# Patient Record
Sex: MALE | Race: WHITE | NOT HISPANIC OR LATINO | Employment: PART TIME | URBAN - METROPOLITAN AREA
[De-identification: names, ages, dates, MRNs, and addresses within clinical notes are randomized per-mention and may not be internally consistent; named-entity substitution may affect disease eponyms.]

---

## 2017-02-13 ENCOUNTER — HOSPITAL ENCOUNTER (OUTPATIENT)
Dept: LAB | Facility: MEDICAL CENTER | Age: 67
End: 2017-02-13
Attending: SPECIALIST
Payer: COMMERCIAL

## 2017-02-13 PROCEDURE — 82105 ALPHA-FETOPROTEIN SERUM: CPT

## 2017-02-13 PROCEDURE — 36415 COLL VENOUS BLD VENIPUNCTURE: CPT

## 2017-02-14 LAB — AFP-TM SERPL-MCNC: 1 NG/ML (ref 0–9)

## 2017-02-21 ENCOUNTER — HOSPITAL ENCOUNTER (OUTPATIENT)
Dept: RADIOLOGY | Facility: MEDICAL CENTER | Age: 67
End: 2017-02-21
Attending: SPECIALIST
Payer: COMMERCIAL

## 2017-02-21 DIAGNOSIS — K70.9 ALCOHOLIC LIVER DISEASE (HCC): ICD-10-CM

## 2017-02-21 PROCEDURE — 76700 US EXAM ABDOM COMPLETE: CPT

## 2017-05-03 ENCOUNTER — TELEPHONE (OUTPATIENT)
Dept: MEDICAL GROUP | Facility: MEDICAL CENTER | Age: 67
End: 2017-05-03

## 2017-06-07 RX ORDER — FLUTICASONE PROPIONATE 50 MCG
SPRAY, SUSPENSION (ML) NASAL
Qty: 16 G | Refills: 0 | Status: SHIPPED | OUTPATIENT
Start: 2017-06-07 | End: 2017-07-17

## 2017-06-07 NOTE — TELEPHONE ENCOUNTER
Was the patient seen in the last year in this department? No    Does patient have an active prescription for medications requested? No     Received Request Via: Pharmacy

## 2017-06-09 ENCOUNTER — OFFICE VISIT (OUTPATIENT)
Dept: URGENT CARE | Facility: CLINIC | Age: 67
End: 2017-06-09
Payer: MEDICARE

## 2017-06-09 VITALS
TEMPERATURE: 98.2 F | OXYGEN SATURATION: 97 % | SYSTOLIC BLOOD PRESSURE: 110 MMHG | BODY MASS INDEX: 23.42 KG/M2 | HEART RATE: 90 BPM | WEIGHT: 154 LBS | DIASTOLIC BLOOD PRESSURE: 70 MMHG | RESPIRATION RATE: 16 BRPM

## 2017-06-09 DIAGNOSIS — J40 BRONCHITIS: Primary | ICD-10-CM

## 2017-06-09 DIAGNOSIS — J02.9 PHARYNGITIS, UNSPECIFIED ETIOLOGY: ICD-10-CM

## 2017-06-09 DIAGNOSIS — J06.9 URI WITH COUGH AND CONGESTION: ICD-10-CM

## 2017-06-09 PROCEDURE — 99214 OFFICE O/P EST MOD 30 MIN: CPT | Performed by: PHYSICIAN ASSISTANT

## 2017-06-09 RX ORDER — METHYLPREDNISOLONE 4 MG/1
TABLET ORAL
Qty: 21 TAB | Refills: 0 | Status: SHIPPED | OUTPATIENT
Start: 2017-06-09 | End: 2017-06-29

## 2017-06-09 RX ORDER — DOXYCYCLINE HYCLATE 100 MG
100 TABLET ORAL 2 TIMES DAILY
Qty: 20 TAB | Refills: 0 | Status: SHIPPED | OUTPATIENT
Start: 2017-06-09 | End: 2017-06-19

## 2017-06-09 ASSESSMENT — ENCOUNTER SYMPTOMS: COUGH: 1

## 2017-06-09 NOTE — PROGRESS NOTES
Subjective:      Pt is a 67 y.o. male who presents with Cough            Cough  This is a new problem. The current episode started in the past 7 days. The problem has been gradually worsening. The problem occurs constantly. The cough is productive of purulent sputum. The symptoms are aggravated by cold air, exercise and lying down. He has tried OTC cough suppressant for the symptoms. The treatment provided no relief. His past medical history is significant for bronchitis and pneumonia. There is no history of asthma or bronchiectasis.   PT presents to  clinic today complaining of sore throat, fevers, chills, watery eyes, pressure in ears, cough, fatigue, runny nose, wheezing and SOB. PT denies CP, NVD, abdominal pain, joint pain. PT states these symptoms began around 7 days ago and that the pt's family has been sick on and off for the last week. Pt has not taken any medications for this condition. PT states the pain is a 7/10 with coughing fits, aching in nature and worse at night.  The pt's medication list, problem list, and allergies have been evaluated and reviewed during today's visit.    PMH:  Past Medical History   Diagnosis Date   • GERD (gastroesophageal reflux disease)    • Chronic back pain    • Diverticulitis        PSH:  Past Surgical History   Procedure Laterality Date   • Colonoscopy - endo  2014     Performed by Moris Stanton D.O. at ENDOSCOPY [facility] Hx:  Noncontributory     Family Status   Relation Status Death Age   • Mother Alive    • Father     • Brother Alive        Soc HX:  Social History     Social History   • Marital Status:      Spouse Name: N/A   • Number of Children: N/A   • Years of Education: N/A     Occupational History   • Not on file.     Social History Main Topics   • Smoking status: Former Smoker -- 0.00 packs/day for 10 years     Quit date: 11/15/2014   • Smokeless tobacco: Current User   • Alcohol Use: No   • Drug Use: No   • Sexual  Activity: Not on file     Other Topics Concern   • Not on file     Social History Narrative    ** Merged History Encounter **              Medications:    Current outpatient prescriptions:   •  BABY ASPIRIN PO, Take  by mouth., Disp: , Rfl:   •  vitamin D (CHOLECALCIFEROL) 1000 UNIT Tab, Take 1,000 Units by mouth every day., Disp: , Rfl:   •  doxycycline (VIBRAMYCIN) 100 MG Tab, Take 1 Tab by mouth 2 times a day for 10 days., Disp: 20 Tab, Rfl: 0  •  Hydrocod Polst-CPM Polst ER (TUSSIONEX) 10-8 MG/5ML Suspension Extended Release, Take 5 mL by mouth every 12 hours., Disp: 140 mL, Rfl: 0  •  MethylPREDNISolone (MEDROL DOSEPAK) 4 MG Tablet Therapy Pack, Use as directed, Disp: 21 Tab, Rfl: 0  •  ranitidine (ZANTAC) 150 MG Tab, Take 150 mg by mouth 2 times a day., Disp: , Rfl:   •  fluticasone (FLONASE) 50 MCG/ACT nasal spray, USE TWO SPRAY(S) IN EACH NOSTRIL ONCE DAILY, Disp: 16 g, Rfl: 0  •  ranitidine (ZANTAC) 150 MG Tab, Take 150 mg by mouth 2 times a day. Indications: Gastroesophageal Reflux Disease, Disp: , Rfl:   •  zolpidem (AMBIEN) 10 MG Tab, TAKE 1 TABLET BY MOUTH AT BEDTIME AS NEEDED FOR SLEEP, Disp: 30 Tab, Rfl: 2  •  Zolpidem Tartrate (ZOLPIMIST PO), Take  by mouth., Disp: , Rfl:   •  fluticasone (FLONASE) 50 MCG/ACT nasal spray, Spray 1 Spray in nose every day., Disp: 16 g, Rfl: 1  •  betamethasone dipropionate (DIPROLENE) 0.05 % Cream, Apply  to affected area(s) 2 times a day., Disp: , Rfl:       Allergies:  Pantoprazole and Pantoprazole        Review of Systems   Respiratory: Positive for cough.    Constitutional: Positive for chills and malaise/fatigue. Negative for fever and diaphoresis.   HENT: Positive for congestion, ear pain and sore throat. Negative for ear discharge, hearing loss, nosebleeds and tinnitus.    Eyes: Negative for blurred vision, double vision and photophobia.   Respiratory continued: Positive for cough, sputum production, shortness of breath and wheezing. Negative for hemoptysis.     Cardiovascular: Negative for chest pain and palpitations.   Gastrointestinal: Negative for nausea, vomiting, abdominal pain, diarrhea and constipation.   Genitourinary: Negative for dysuria and flank pain.   Musculoskeletal: Negative for joint pain and myalgias.   Skin: Negative for itching and rash.   Neurological: Positive for headaches. Negative for dizziness, tingling and weakness.   Endo/Heme/Allergies: Does not bruise/bleed easily.   Psychiatric/Behavioral: Negative for depression. The patient is not nervous/anxious.               Objective:     /70 mmHg  Pulse 90  Temp(Src) 36.8 °C (98.2 °F)  Resp 16  Wt 69.854 kg (154 lb)  SpO2 97%     Physical Exam      Physical Exam   Constitutional: PT is oriented to person, place, and time. PT appears well-developed and well-nourished. No distress.   HENT:   Head: Normocephalic and atraumatic.   Right Ear: Hearing, tympanic membrane, external ear and ear canal normal.   Left Ear: Hearing, tympanic membrane, external ear and ear canal normal.   Nose: Mucosal edema, rhinorrhea and sinus tenderness present. Right sinus exhibits frontal sinus tenderness. Left sinus exhibits frontal sinus tenderness.   Mouth/Throat: Uvula is midline. Mucous membranes are pale. Posterior oropharyngeal edema and posterior oropharyngeal erythema present. No oropharyngeal exudate.   Eyes: Conjunctivae normal and EOM are normal. Pupils are equal, round, and reactive to light. Right eye exhibits no discharge. Left eye exhibits no discharge.   Neck: Normal range of motion. Neck supple. No thyromegaly present.   Cardiovascular: Normal rate, regular rhythm, normal heart sounds and intact distal pulses.  Exam reveals no gallop and no friction rub.    No murmur heard.  Pulmonary/Chest: Effort normal. No respiratory distress. PT has wheezes. PT has no rales. PT exhibits tenderness.   Abdominal: Soft. Bowel sounds are normal. PT exhibits no distension and no mass. There is no tenderness.  There is no rebound and no guarding.   Musculoskeletal: Normal range of motion. PT exhibits no edema and no tenderness.   Lymphadenopathy:     PT has no cervical adenopathy.   Neurological: Pt is alert and oriented to person, place, and time. Pt has normal reflexes. No cranial nerve deficit.   Skin: Skin is warm and dry. No rash noted. No erythema.   Psychiatric: PT has a normal mood and affect. Pt behavior is normal. Judgment and thought content normal.          Assessment/Plan:     1. Bronchitis    - doxycycline (VIBRAMYCIN) 100 MG Tab; Take 1 Tab by mouth 2 times a day for 10 days.  Dispense: 20 Tab; Refill: 0  - MethylPREDNISolone (MEDROL DOSEPAK) 4 MG Tablet Therapy Pack; Use as directed  Dispense: 21 Tab; Refill: 0    2. URI with cough and congestion    - Hydrocod Polst-CPM Polst ER (TUSSIONEX) 10-8 MG/5ML Suspension Extended Release; Take 5 mL by mouth every 12 hours.  Dispense: 140 mL; Refill: 0  - MethylPREDNISolone (MEDROL DOSEPAK) 4 MG Tablet Therapy Pack; Use as directed  Dispense: 21 Tab; Refill: 0    3. Pharyngitis, unspecified etiology      Rest, fluids encouraged.  OTC decongestant for congestion/cough  AVS with medical info given.  Pt was in full understanding and agreement with the plan.  Follow-up as needed if symptoms worsen or fail to improve.

## 2017-06-09 NOTE — PATIENT INSTRUCTIONS
Acute Bronchitis  Bronchitis is inflammation of the airways that extend from the windpipe into the lungs (bronchi). The inflammation often causes mucus to develop. This leads to a cough, which is the most common symptom of bronchitis.   In acute bronchitis, the condition usually develops suddenly and goes away over time, usually in a couple weeks. Smoking, allergies, and asthma can make bronchitis worse. Repeated episodes of bronchitis may cause further lung problems.   CAUSES  Acute bronchitis is most often caused by the same virus that causes a cold. The virus can spread from person to person (contagious) through coughing, sneezing, and touching contaminated objects.  SIGNS AND SYMPTOMS   · Cough.    · Fever.    · Coughing up mucus.    · Body aches.    · Chest congestion.    · Chills.    · Shortness of breath.    · Sore throat.    DIAGNOSIS   Acute bronchitis is usually diagnosed through a physical exam. Your health care provider will also ask you questions about your medical history. Tests, such as chest X-rays, are sometimes done to rule out other conditions.   TREATMENT   Acute bronchitis usually goes away in a couple weeks. Oftentimes, no medical treatment is necessary. Medicines are sometimes given for relief of fever or cough. Antibiotic medicines are usually not needed but may be prescribed in certain situations. In some cases, an inhaler may be recommended to help reduce shortness of breath and control the cough. A cool mist vaporizer may also be used to help thin bronchial secretions and make it easier to clear the chest.   HOME CARE INSTRUCTIONS  · Get plenty of rest.    · Drink enough fluids to keep your urine clear or pale yellow (unless you have a medical condition that requires fluid restriction). Increasing fluids may help thin your respiratory secretions (sputum) and reduce chest congestion, and it will prevent dehydration.    · Take medicines only as directed by your health care provider.  · If  you were prescribed an antibiotic medicine, finish it all even if you start to feel better.  · Avoid smoking and secondhand smoke. Exposure to cigarette smoke or irritating chemicals will make bronchitis worse. If you are a smoker, consider using nicotine gum or skin patches to help control withdrawal symptoms. Quitting smoking will help your lungs heal faster.    · Reduce the chances of another bout of acute bronchitis by washing your hands frequently, avoiding people with cold symptoms, and trying not to touch your hands to your mouth, nose, or eyes.    · Keep all follow-up visits as directed by your health care provider.    SEEK MEDICAL CARE IF:  Your symptoms do not improve after 1 week of treatment.   SEEK IMMEDIATE MEDICAL CARE IF:  · You develop an increased fever or chills.    · You have chest pain.    · You have severe shortness of breath.  · You have bloody sputum.    · You develop dehydration.  · You faint or repeatedly feel like you are going to pass out.  · You develop repeated vomiting.  · You develop a severe headache.  MAKE SURE YOU:   · Understand these instructions.  · Will watch your condition.  · Will get help right away if you are not doing well or get worse.     This information is not intended to replace advice given to you by your health care provider. Make sure you discuss any questions you have with your health care provider.     Document Released: 01/25/2006 Document Revised: 01/08/2016 Document Reviewed: 06/10/2014  [employer] Interactive Patient Education ©2016 [employer]

## 2017-06-09 NOTE — MR AVS SNAPSHOT
Vickey Dalal Johnny   2017 8:15 AM   Office Visit   MRN: [id number]    Department:  [facility]   Dept Phone:  [phone]    Description:  Male : 1950   Provider:  Roland Mackay PA-C           Reason for Visit     Cough x 5 days, productive cough, shortness of breath, cough is worse at nig[place], runny nose and little sore throat      Allergies as of 2017     Allergen Noted Reactions    Pantoprazole 2015   Rash    itching    Pantoprazole 2016         You were diagnosed with     Bronchitis   [[id number]]  -  Primary     URI with cough and congestion   [[id number]]       Pharyngitis, unspecified etiology   [[id number]]         Vital Signs     Blood Pressure Pulse Temperature Respirations Weight Oxygen Saturation    110/70 mmHg 90 36.8 °C (98.2 °F) 16 69.854 kg (154 lb) 97%    Smoking Status                   Former Smoker           Basic Information     Date Of Birth Sex Race Ethnicity Preferred Language    1950 Male White Non- English      Your appointments     2017  9:00 AM   ANNUAL EXAM PREVENTATIVE with Bharath Mathias M.D.   [facility] (--)    [address]  [address]  [facility] [id number] [phone]              Problem List              ICD-10-CM Priority Class Noted - Resolved    Moderate alcohol use disorder F10.20   10/29/2014 - Present    Seasonal allergies J30.2   10/29/2014 - Present    Skin lesions, generalized L98.9   10/29/2014 - Present    GI bleed K92.2   2014 - Present    Marijuana use F12.10   2014 - Present    Diverticulitis K57.92   2014 - Present    Insomnia G47.00   2014 - Present    Alcohol dependence ([facility]) F10.20   2014 - Present    Lumbosacral spondylosis M47.817   5/15/2015 - Present    Left foot pain M79.672   5/15/2015 - Present    Lumbar radiculopathy M54.16   5/15/2015 - Present    DDD (degenerative disc disease), lumbar M51.36   5/15/2015 - Present    Chronic back  pain M54.9, G89.29   5/15/2015 - Present    Rash R21   6/23/2015 - Present    Gastroesophageal reflux disease with esophagitis K21.0   8/5/2015 - Present    Preventative health care Z00.00   3/14/2016 - Present    Controlled substance agreement signed Z79.899   4/27/2016 - Present      Health Maintenance        Date Due Completion Dates    IMM DTaP/Tdap/Td Vaccine (1 - Tdap) 3/17/1971 ---    IMM ZOSTER VACCINE 3/17/2012 ---    IMM PNEUMOCOCCAL 65+ (ADULT) LOW/MEDIUM RISK SERIES (1 of 2 - PCV13) 3/17/2017 ---    COLONOSCOPY 3/14/2025 3/14/2015 (Done), 11/17/2014, 10/29/2007 (Prv Comp)    Override on 3/14/2015: Done    Override on 10/29/2007: Previously completed            Current Immunizations     No immunizations on file.      Below and/or attached are the medications your provider expects you to take. Review all of your home medications and newly ordered medications with your provider and/or pharmacist. Follow medication instructions as directed by your provider and/or pharmacist. Please keep your medication list with you and share with your provider. Update the information when medications are discontinued, doses are changed, or new medications (including over-the-counter products) are added; and carry medication information at all times in the event of emergency situations     Allergies:  PANTOPRAZOLE - Rash     PANTOPRAZOLE - (reactions not documented)               Medications  Valid as of: June 09, 2017 -  8:42 AM    Generic Name Brand Name Tablet Size Instructions for use    Aspirin   Take  by mouth.        Betamethasone Dipropionate (Cream) DIPROLENE 0.05 % Apply  to affected area(s) 2 times a day.        Cholecalciferol (Tab) cholecalciferol 1000 UNIT Take 1,000 Units by mouth every day.        Doxycycline Hyclate (Tab) VIBRAMYCIN 100 MG Take 1 Tab by mouth 2 times a day for 10 days.        Fluticasone Propionate (Suspension) FLONASE 50 MCG/ACT Spray 1 Spray in nose every day.        Fluticasone Propionate  (Suspension) FLONASE 50 MCG/ACT USE TWO SPRAY(S) IN EACH NOSTRIL ONCE DAILY        Hydrocod Polst-Chlorphen Polst (Suspension Extended Release) TUSSIONEX 10-8 MG/5ML Take 5 mL by mouth every 12 hours.        MethylPREDNISolone (Tablet Therapy Pack) MEDROL DOSEPAK 4 MG Use as directed        RaNITidine HCl (Tab) ZANTAC 150 MG Take 150 mg by mouth 2 times a day.        RaNITidine HCl (Tab) ZANTAC 150 MG Take 150 mg by mouth 2 times a day. Indications: Gastroesophageal Reflux Disease        Zolpidem Tartrate   Take  by mouth.        Zolpidem Tartrate (Tab) AMBIEN 10 MG TAKE 1 TABLET BY MOUTH AT BEDTIME AS NEEDED FOR SLEEP        .                 Medicines prescribed today were sent to:     [facility] #[id number] - [place], [place] - [address]    [address] [place] [address]    Phone: [phone] Fax: [phone]    Open 24 Hours?: No    [facility]  [place] (), [place] - [address] [place] () [place] [address]    Phone: [phone] Fax: [phone]    Open 24 Hours?: No      Medication refill instructions:       If your prescription bottle indicates you have medication refills left, it is not necessary to call your provider’s office. Please contact your pharmacy and they will refill your medication.    If your prescription bottle indicates you do not have any refills left, you may request refills at any time through one of the following ways: The online [employer] system (except Urgent Care), by calling your provider’s office, or by asking your pharmacy to contact your provider’s office with a refill request. Medication refills are processed only during regular business hours and may not be available until the next business day. Your provider may request additional information or to have a follow-up visit with you prior to refilling your medication.   *Please Note: Medication refills are assigned a new Rx number when refilled electronically. Your pharmacy may indicate that no refills were  authorized even though a new prescription for the same medication is available at the pharmacy. Please request the medicine by name with the pharmacy before contacting your provider for a refill.        Instructions    Acute Bronchitis  Bronchitis is inflammation of the airways that extend from the windpipe into the lungs (bronchi). The inflammation often causes mucus to develop. This leads to a cough, which is the most common symptom of bronchitis.   In acute bronchitis, the condition usually develops suddenly and goes away over time, usually in a couple weeks. Smoking, allergies, and asthma can make bronchitis worse. Repeated episodes of bronchitis may cause further lung problems.   CAUSES  Acute bronchitis is most often caused by the same virus that causes a cold. The virus can spread from person to person (contagious) through coughing, sneezing, and touching contaminated objects.  SIGNS AND SYMPTOMS   · Cough.    · Fever.    · Coughing up mucus.    · Body aches.    · Chest congestion.    · Chills.    · Shortness of breath.    · Sore throat.    DIAGNOSIS   Acute bronchitis is usually diagnosed through a physical exam. Your health care provider will also ask you questions about your medical history. Tests, such as chest X-rays, are sometimes done to rule out other conditions.   TREATMENT   Acute bronchitis usually goes away in a couple weeks. Oftentimes, no medical treatment is necessary. Medicines are sometimes given for relief of fever or cough. Antibiotic medicines are usually not needed but may be prescribed in certain situations. In some cases, an inhaler may be recommended to help reduce shortness of breath and control the cough. A cool mist vaporizer may also be used to help thin bronchial secretions and make it easier to clear the chest.   HOME CARE INSTRUCTIONS  · Get plenty of rest.    · Drink enough fluids to keep your urine clear or pale yellow (unless you have a medical condition that requires fluid  restriction). Increasing fluids may help thin your respiratory secretions (sputum) and reduce chest congestion, and it will prevent dehydration.    · Take medicines only as directed by your health care provider.  · If you were prescribed an antibiotic medicine, finish it all even if you start to feel better.  · Avoid smoking and secondhand smoke. Exposure to cigarette smoke or irritating chemicals will make bronchitis worse. If you are a smoker, consider using nicotine gum or skin patches to help control withdrawal symptoms. Quitting smoking will help your lungs heal faster.    · Reduce the chances of another bout of acute bronchitis by washing your hands frequently, avoiding people with cold symptoms, and trying not to touch your hands to your mouth, nose, or eyes.    · Keep all follow-up visits as directed by your health care provider.    SEEK MEDICAL CARE IF:  Your symptoms do not improve after 1 week of treatment.   SEEK IMMEDIATE MEDICAL CARE IF:  · You develop an increased fever or chills.    · You have chest pain.    · You have severe shortness of breath.  · You have bloody sputum.    · You develop dehydration.  · You faint or repeatedly feel like you are going to pass out.  · You develop repeated vomiting.  · You develop a severe headache.  MAKE SURE YOU:   · Understand these instructions.  · Will watch your condition.  · Will get help right away if you are not doing well or get worse.     This information is not intended to replace advice given to you by your health care provider. Make sure you discuss any questions you have with your health care provider.     Document Released: 01/25/2006 Document Revised: 01/08/2016 Document Reviewed: 06/10/2014  [employer] Interactive Patient Education ©2016 [employer]            [employer] Access Code: Activation code not generated  Current Generic Media Status: Active          Quit Tobacco Information     Do you want to quit using tobacco?    Quitting tobacco decreases risks  of cancer, heart and lung disease, increases life expectancy, improves sense of taste and smell, and increases spending money, among other benefits.    If you are thinking about quitting, we can help.  • [facility] Quit Tobacco Program: 291.268.8678  o Program occurs weekly for four weeks and includes pharmacist consultation on products to support quitting smoking or chewing tobacco. A provider referral is needed for pharmacist consultation.  • Tobacco Users Help Hotline: 9-536-QUIT-NOW (049-5531) or https://[place].quitlogix.org/  o Free, confidential telephone and online coaching for [place] residents. Sessions are designed on a schedule that is convenient for you. Eligible clients receive free nicotine replacement therapy.  • Nationally: www.smokefree.gov  o Information and professional assistance to support both immediate and long-term needs as you become, and remain, a non-smoker. Smokefree.gov allows you to choose the help that best fits your needs.

## 2017-06-29 ENCOUNTER — OFFICE VISIT (OUTPATIENT)
Dept: MEDICAL GROUP | Facility: PHYSICIAN GROUP | Age: 67
End: 2017-06-29
Payer: MEDICARE

## 2017-06-29 VITALS
HEIGHT: 68 IN | RESPIRATION RATE: 16 BRPM | DIASTOLIC BLOOD PRESSURE: 80 MMHG | SYSTOLIC BLOOD PRESSURE: 126 MMHG | OXYGEN SATURATION: 95 % | TEMPERATURE: 97.8 F | WEIGHT: 151 LBS | HEART RATE: 80 BPM | BODY MASS INDEX: 22.88 KG/M2

## 2017-06-29 DIAGNOSIS — J30.1 SEASONAL ALLERGIC RHINITIS DUE TO POLLEN: ICD-10-CM

## 2017-06-29 DIAGNOSIS — K70.9 ALCOHOLIC LIVER DISEASE, UNSPECIFIED (HCC): ICD-10-CM

## 2017-06-29 DIAGNOSIS — F10.21 ALCOHOL DEPENDENCE IN REMISSION (HCC): ICD-10-CM

## 2017-06-29 DIAGNOSIS — K21.00 GASTROESOPHAGEAL REFLUX DISEASE WITH ESOPHAGITIS: ICD-10-CM

## 2017-06-29 DIAGNOSIS — M51.36 DDD (DEGENERATIVE DISC DISEASE), LUMBAR: ICD-10-CM

## 2017-06-29 DIAGNOSIS — K57.32 DIVERTICULITIS OF LARGE INTESTINE WITHOUT PERFORATION OR ABSCESS WITHOUT BLEEDING: ICD-10-CM

## 2017-06-29 DIAGNOSIS — F12.90 MARIJUANA USE: ICD-10-CM

## 2017-06-29 DIAGNOSIS — E78.2 MIXED HYPERLIPIDEMIA: ICD-10-CM

## 2017-06-29 DIAGNOSIS — R35.0 URINARY FREQUENCY: ICD-10-CM

## 2017-06-29 PROCEDURE — 99214 OFFICE O/P EST MOD 30 MIN: CPT | Performed by: NURSE PRACTITIONER

## 2017-06-29 RX ORDER — HYDROCODONE BITARTRATE AND ACETAMINOPHEN 10; 325 MG/1; MG/1
1 TABLET ORAL DAILY
COMMUNITY
Start: 2017-06-07 | End: 2017-08-25

## 2017-06-29 ASSESSMENT — PAIN SCALES - GENERAL: PAINLEVEL: NO PAIN

## 2017-06-29 ASSESSMENT — PATIENT HEALTH QUESTIONNAIRE - PHQ9: CLINICAL INTERPRETATION OF PHQ2 SCORE: 0

## 2017-06-29 NOTE — MR AVS SNAPSHOT
"        Vickey Turner   2017 1:40 PM   Office Visit   MRN: [id number]    Department:  [facility]   Dept Phone:  [phone]    Description:  Male : 1950   Provider:  ANCELMO Humphreys           Reason for Visit     Establish Care New Patient     Annual Exam     Cough x 9 month       Allergies as of 2017     Allergen Noted Reactions    Pantoprazole 2015   Rash    itching    Pantoprazole 2016         You were diagnosed with     Urinary frequency   [788.41.ICD-9-CM]       Alcoholic liver disease, unspecified (CMS-HCC)   [3690902]       Mixed hyperlipidemia   [272.2.ICD-9-CM]         Vital Signs     Blood Pressure Pulse Temperature Respirations Height Weight    126/80 mmHg 80 36.6 °C (97.8 °F) 16 1.727 m (5' 7.99\") 68.493 kg (151 lb)    Body Mass Index Oxygen Saturation Smoking Status             22.96 kg/m2 95% Never Smoker          Basic Information     Date Of Birth Sex Race Ethnicity Preferred Language    1950 Male White Non- English      Your appointments     Aug 03, 2017  8:00 AM   Established Patient with ANCELMO Humphreys   [facility] (--)    [address]  [address]  [place] [address] [phone]           You will be receiving a confirmation call a few days before your appointment from our automated call confirmation system.              Problem List              ICD-10-CM Priority Class Noted - Resolved    Seasonal allergies J30.2   10/29/2014 - Present    Marijuana use F12.10   2014 - Present    Diverticulitis K57.92   2014 - Present    Insomnia G47.00   2014 - Present    Alcohol dependence (CMS-HCC) F10.20   2014 - Present    Lumbar radiculopathy M54.16   5/15/2015 - Present    DDD (degenerative disc disease), lumbar M51.36   5/15/2015 - Present    Chronic back pain M54.9, G89.29   5/15/2015 - Present    Gastroesophageal reflux disease with esophagitis K21.0   2015 - " Present    [facility] health care Z00.00   3/14/2016 - Present    Controlled substance agreement signed Z79.899   4/27/2016 - Present      Health Maintenance        Date Due Completion Dates    IMM ZOSTER VACCINE 3/17/2012 ---    IMM PNEUMOCOCCAL 65+ (ADULT) LOW/MEDIUM RISK SERIES (1 of 2 - PCV13) 3/17/2017 ---    IMM DTaP/Tdap/Td Vaccine (2 - Td) 2/16/2021 2/16/2011    COLONOSCOPY 11/17/2024 11/17/2014, 11/16/2014            Current Immunizations     Tdap Vaccine 2/16/2011      Below and/or attached are the medications your provider expects you to take. Review all of your home medications and newly ordered medications with your provider and/or pharmacist. Follow medication instructions as directed by your provider and/or pharmacist. Please keep your medication list with you and share with your provider. Update the information when medications are discontinued, doses are changed, or new medications (including over-the-counter products) are added; and carry medication information at all times in the event of emergency situations     Allergies:  PANTOPRAZOLE - Rash     PANTOPRAZOLE - (reactions not documented)               Medications  Valid as of: June 29, 2017 -  2:22 PM    Generic Name Brand Name Tablet Size Instructions for use    Aspirin   Take  by mouth.        Betamethasone Dipropionate (Cream) DIPROLENE 0.05 % Apply  to affected area(s) 2 times a day.        Cholecalciferol (Tab) cholecalciferol 1000 UNIT Take 1,000 Units by mouth every day.        Fluticasone Propionate (Suspension) FLONASE 50 MCG/ACT Spray 1 Spray in nose every day.        Fluticasone Propionate (Suspension) FLONASE 50 MCG/ACT USE TWO SPRAY(S) IN EACH NOSTRIL ONCE DAILY        Hydrocodone-Acetaminophen (Tab) NORCO  MG Take 1 Tab by mouth every day.        RaNITidine HCl (Tab) ZANTAC 150 MG Take 150 mg by mouth 2 times a day.        RaNITidine HCl (Tab) ZANTAC 150 MG Take 150 mg by mouth 2 times a day. Indications: Gastroesophageal  Reflux Disease        Zolpidem Tartrate   Take  by mouth.        Zolpidem Tartrate (Tab) AMBIEN 10 MG TAKE 1 TABLET BY MOUTH AT BEDTIME AS NEEDED FOR SLEEP        .                 Medicines prescribed today were sent to:     [facility] [address] [place] [address] (), [place] - [address]    [address] () [place] [address]    Phone: [phone] Fax: [phone]    Open 24 Hours?: No      Medication refill instructions:       If your prescription bottle indicates you have medication refills left, it is not necessary to call your provider’s office. Please contact your pharmacy and they will refill your medication.    If your prescription bottle indicates you do not have any refills left, you may request refills at any time through one of the following ways: The online [employer] system (except Urgent Care), by calling your provider’s office, or by asking your pharmacy to contact your provider’s office with a refill request. Medication refills are processed only during regular business hours and may not be available until the next business day. Your provider may request additional information or to have a follow-up visit with you prior to refilling your medication.   *Please Note: Medication refills are assigned a new Rx number when refilled electronically. Your pharmacy may indicate that no refills were authorized even though a new prescription for the same medication is available at the pharmacy. Please request the medicine by name with the pharmacy before contacting your provider for a refill.        Your To Do List     Future Labs/Procedures Complete By Expires    CBC WITH DIFFERENTIAL  As directed 6/29/2018    COMP METABOLIC PANEL  As directed 6/29/2018    LIPID PROFILE  As directed 6/29/2018    URINALYSIS,CULTURE IF INDICATED  As directed 6/29/2018      Referral     A referral request has been sent to our patient care coordination department. Please allow 3-5 business days for us to process this request and  contact you either by phone or mail. If you do not hear from us by the 5th business day, please call us at [phone].           [employer] Access Code: Activation code not generated  Current [employer] Status: Active

## 2017-06-30 NOTE — ASSESSMENT & PLAN NOTE
Chronic in nature. Stable. Patient has follow-up with gastroenterology Dr. Dinero this month. Patient does not have blood in his stool, denies lightheadedness or dizziness, no nausea vomiting diarrhea.

## 2017-06-30 NOTE — ASSESSMENT & PLAN NOTE
Chronic in nature. Worsening. Patient states that he has been having symptoms most days related to reflux states that he is continuing to take ranitidine but that is controlling his symptoms as well as his symptoms as well as it used.  Patient has an appointment to follow-up with Dr. Dinero regarding this issue patient will discuss this with Dr. Dinero possibly change medications. Hopefully this will relieve patient's cough if this does not improve cough patient will follow up with this provider regarding cough.

## 2017-06-30 NOTE — ASSESSMENT & PLAN NOTE
Patient states that over the last couple of years he has been having frequency of urination. States that he feels like he is not emptying his bladder completely as he is having to go to the bathroom approximately one hour after going states that he is waking up several times a night denies weak stream, difficulty starting his stream, burning or pain with urination. Counseled patient regarding options, she is requesting referral to urology regarding this issue.

## 2017-06-30 NOTE — ASSESSMENT & PLAN NOTE
Chronic in nature. Patient does have history of alcoholism states he is in remission at this time. Has not had a drink in 3 years.

## 2017-06-30 NOTE — ASSESSMENT & PLAN NOTE
Chronic in nature. Stable. Patient states that he uses Flonase, over-the-counter medication for this condition. Patient does have mild cough which he believes is related to GERD, some congestion. Denies itchy eyes, runny nose at this time.

## 2017-06-30 NOTE — PROGRESS NOTES
Chief Complaint   Patient presents with   • Establish Care     New Patient    • Annual Exam   • Cough     x 9 month        HISTORY OF THE PRESENT ILLNESS: This is a 67 y.o. male new patient to our practice. This pleasant patient is here today to discuss multiple issues.    Seasonal allergies  Chronic in nature. Stable. Patient states that he uses Flonase, over-the-counter medication for this condition. Patient does have mild cough which he believes is related to GERD, some congestion. Denies itchy eyes, runny nose at this time.    Marijuana use  Chronic in nature. Intermittent.    Diverticulitis  Chronic in nature. Stable. Patient has follow-up with gastroenterology Dr. Dinero this month. Patient does not have blood in his stool, denies lightheadedness or dizziness, no nausea vomiting diarrhea.    Alcohol dependence  Chronic in nature. Patient does have history of alcoholism states he is in remission at this time. Has not had a drink in 3 years.    DDD (degenerative disc disease), lumbar  Chronic in nature. Stable. Patient is followed by Dr. Hernandez for pain management.     Gastroesophageal reflux disease with esophagitis  Chronic in nature. Worsening. Patient states that he has been having symptoms most days related to reflux states that he is continuing to take ranitidine but that is controlling his symptoms as well as his symptoms as well as it used.  Patient has an appointment to follow-up with Dr. Dinero regarding this issue patient will discuss this with Dr. Dinero possibly change medications. Hopefully this will relieve patient's cough if this does not improve cough patient will follow up with this provider regarding cough.    Urinary frequency  Patient states that over the last couple of years he has been having frequency of urination. States that he feels like he is not emptying his bladder completely as he is having to go to the bathroom approximately one hour after going states that he is waking up  several times a night denies weak stream, difficulty starting his stream, burning or pain with urination. Counseled patient regarding options, she is requesting referral to urology regarding this issue.    Alcoholic liver disease, unspecified (CMS-HCC)  Chronic in nature. Stable, mild per patient patient follows with Dr. Dinero regarding this issue.        Past Medical History   Diagnosis Date   • GERD (gastroesophageal reflux disease)    • Chronic back pain    • Diverticulitis        Past Surgical History   Procedure Laterality Date   • Colonoscopy - endo  2014     Performed by Moris Stanton D.O. at ENDOSCOPY [facility]       Family Status   Relation Status Death Age   • Mother Alive    • Father     • Brother Alive    History reviewed. No pertinent family history.    Social History   Substance Use Topics   • Smoking status: Never Smoker    • Smokeless tobacco: Never Used      Comment: +second-hand exposure   • Alcohol Use: No      Comment: quit 3 years        Allergies: Pantoprazole and Pantoprazole    Current Outpatient Prescriptions Ordered in [facility]   Medication Sig Dispense Refill   • fluticasone (FLONASE) 50 MCG/ACT nasal spray USE TWO SPRAY(S) IN EACH NOSTRIL ONCE DAILY 16 g 0   • zolpidem (AMBIEN) 10 MG Tab TAKE 1 TABLET BY MOUTH AT BEDTIME AS NEEDED FOR SLEEP 30 Tab 2   • fluticasone (FLONASE) 50 MCG/ACT nasal spray Spray 1 Spray in nose every day. 16 g 1   • hydrocodone/acetaminophen (NORCO)  MG Tab Take 1 Tab by mouth every day.     • BABY ASPIRIN PO Take  by mouth.     • vitamin D (CHOLECALCIFEROL) 1000 UNIT Tab Take 1,000 Units by mouth every day.     • ranitidine (ZANTAC) 150 MG Tab Take 150 mg by mouth 2 times a day. Indications: Gastroesophageal Reflux Disease     • ranitidine (ZANTAC) 150 MG Tab Take 150 mg by mouth 2 times a day.     • Zolpidem Tartrate (ZOLPIMIST PO) Take  by mouth.     • betamethasone dipropionate (DIPROLENE) 0.05 % Cream Apply  to affected area(s)  "2 times a day.       No current [facility]-ordered facility-administered medications on file.       Review of Systems   Constitutional:  Negative for fever, chills, weight loss and malaise/fatigue.   HENT:  Negative for ear pain, nosebleeds, congestion, sore throat and neck pain.    Eyes:  Negative for blurred vision.   Respiratory:  Negative for cough, sputum production, shortness of breath and wheezing.    Cardiovascular:  Negative for chest pain, palpitations, orthopnea and leg swelling.   Gastrointestinal:  Negative for heartburn, nausea, vomiting and abdominal pain.   Genitourinary:  Negative for dysuria, urgency and frequency.   Musculoskeletal:  Negative for myalgias, back pain and joint pain.   Skin:  Negative for rash and itching.   Neurological:  Negative for dizziness, tingling, tremors, sensory change, focal weakness and headaches.   Endo/Heme/Allergies:  Does not bruise/bleed easily.   Psychiatric/Behavioral:  Negative for depression, anxiety, or memory loss.     All other systems reviewed and are negative except as in HPI.    Exam: Blood pressure 126/80, pulse 80, temperature 36.6 °C (97.8 °F), resp. rate 16, height 1.727 m (5' 7.99\"), weight 68.493 kg (151 lb), SpO2 95 %.  General:  Normal appearing. No distress.  HEENT:  Normocephalic. Eyes conjunctiva clear lids without ptosis, pupils equal and reactive to light accommodation, ears normal shape and contour, canals are clear bilaterally, tympanic membranes are benign, nasal mucosa benign, oropharynx is without erythema, edema or exudates.   Neck:  Supple without JVD or bruit. Thyroid is not enlarged.  Pulmonary:  Clear to ausculation.  Normal effort. No rales, ronchi, or wheezing.  Cardiovascular:  Regular rate and rhythm without murmur. Carotid and radial pulses are intact and equal bilaterally.  Abdomen:  Soft, nontender, nondistended. Normal bowel sounds. Liver and spleen are not palpable  Neurologic:  Grossly nonfocal  Lymph:  No cervical, " supraclavicular or axillary lymph nodes are palpable  Skin:  Warm and dry.  No obvious lesions.  Musculoskeletal:  Normal gait. No extremity cyanosis, clubbing, or edema.  Psych:  Normal mood and affect. Alert and oriented x3. Judgment and insight is normal.    Medical decision making: Vickey is a generally well-appearing 67-year-old male patient who skis frequently. Patient has chronic back pain which is followed by pain management, alcoholic liver disease chronic issues with diverticulitis and GERD which is followed by gastroenterology. CMP, CBC, lipid profile are ordered to check on chronic conditions urinalysis was also ordered at this time to rule out urinary tract infection and patient is referred to urology with regard to urinary symptoms possibly indicating BPH. Patient does refuse digital rectal exam this time. Patient will follow up in one month to discuss labs or as needed or in one year for wellness exam. Patient is encouraged to be seen in the emergency room for chest pain, palpitations, shortness of breath, dizziness, severe abdominal pain or other concerning symptoms.    Please note that this dictation was created using voice recognition software. I have made every reasonable attempt to correct obvious errors, but I expect that there are errors of grammar and possibly content that I did not discover before finalizing the note.      Assessment/Plan  1. Urinary frequency  REFERRAL TO UROLOGY    URINALYSIS,CULTURE IF INDICATED   2. Alcoholic liver disease, unspecified (CMS-HCC)  COMP METABOLIC PANEL    CBC WITH DIFFERENTIAL   3. Mixed hyperlipidemia  LIPID PROFILE   4. Seasonal allergic rhinitis due to pollen     5. Marijuana use     6. Diverticulitis of large intestine without perforation or abscess without bleeding     7. Alcohol dependence in remission (CMS-[facility])     8. DDD (degenerative disc disease), lumbar     9. Gastroesophageal reflux disease with esophagitis           I have placed the below  orders and discussed them with an approved delegating provider. The MA is performing the below orders under the direction of Dr. Frank.

## 2017-07-10 ENCOUNTER — HOSPITAL ENCOUNTER (OUTPATIENT)
Dept: LAB | Facility: MEDICAL CENTER | Age: 67
End: 2017-07-10
Attending: NURSE PRACTITIONER
Payer: MEDICARE

## 2017-07-10 DIAGNOSIS — R35.0 URINARY FREQUENCY: ICD-10-CM

## 2017-07-10 DIAGNOSIS — K70.9 ALCOHOLIC LIVER DISEASE, UNSPECIFIED (HCC): ICD-10-CM

## 2017-07-10 DIAGNOSIS — E78.2 MIXED HYPERLIPIDEMIA: ICD-10-CM

## 2017-07-10 LAB
ALBUMIN SERPL BCP-MCNC: 4 G/DL (ref 3.2–4.9)
ALBUMIN/GLOB SERPL: 1.5 G/DL
ALP SERPL-CCNC: 79 U/L (ref 30–99)
ALT SERPL-CCNC: 18 U/L (ref 2–50)
ANION GAP SERPL CALC-SCNC: 6 MMOL/L (ref 0–11.9)
APPEARANCE UR: CLEAR
AST SERPL-CCNC: 20 U/L (ref 12–45)
BASOPHILS # BLD AUTO: 0.9 % (ref 0–1.8)
BASOPHILS # BLD: 0.05 K/UL (ref 0–0.12)
BILIRUB SERPL-MCNC: 0.9 MG/DL (ref 0.1–1.5)
BILIRUB UR QL STRIP.AUTO: NEGATIVE
BUN SERPL-MCNC: 14 MG/DL (ref 8–22)
CALCIUM SERPL-MCNC: 9.2 MG/DL (ref 8.5–10.5)
CHLORIDE SERPL-SCNC: 103 MMOL/L (ref 96–112)
CHOLEST SERPL-MCNC: 204 MG/DL (ref 100–199)
CO2 SERPL-SCNC: 29 MMOL/L (ref 20–33)
COLOR UR: YELLOW
CREAT SERPL-MCNC: 1.04 MG/DL (ref 0.5–1.4)
CULTURE IF INDICATED INDCX: NO UA CULTURE
EOSINOPHIL # BLD AUTO: 0.2 K/UL (ref 0–0.51)
EOSINOPHIL NFR BLD: 3.8 % (ref 0–6.9)
ERYTHROCYTE [DISTWIDTH] IN BLOOD BY AUTOMATED COUNT: 43.7 FL (ref 35.9–50)
GFR SERPL CREATININE-BSD FRML MDRD: >60 ML/MIN/1.73 M 2
GLOBULIN SER CALC-MCNC: 2.6 G/DL (ref 1.9–3.5)
GLUCOSE SERPL-MCNC: 86 MG/DL (ref 65–99)
GLUCOSE UR STRIP.AUTO-MCNC: NEGATIVE MG/DL
HCT VFR BLD AUTO: 43.8 % (ref 42–52)
HDLC SERPL-MCNC: 55 MG/DL
HGB BLD-MCNC: 14.4 G/DL (ref 14–18)
IMM GRANULOCYTES # BLD AUTO: 0.01 K/UL (ref 0–0.11)
IMM GRANULOCYTES NFR BLD AUTO: 0.2 % (ref 0–0.9)
KETONES UR STRIP.AUTO-MCNC: NEGATIVE MG/DL
LDLC SERPL CALC-MCNC: 136 MG/DL
LEUKOCYTE ESTERASE UR QL STRIP.AUTO: NEGATIVE
LYMPHOCYTES # BLD AUTO: 1.58 K/UL (ref 1–4.8)
LYMPHOCYTES NFR BLD: 29.8 % (ref 22–41)
MCH RBC QN AUTO: 31.2 PG (ref 27–33)
MCHC RBC AUTO-ENTMCNC: 32.9 G/DL (ref 33.7–35.3)
MCV RBC AUTO: 94.8 FL (ref 81.4–97.8)
MICRO URNS: NORMAL
MONOCYTES # BLD AUTO: 0.66 K/UL (ref 0–0.85)
MONOCYTES NFR BLD AUTO: 12.5 % (ref 0–13.4)
NEUTROPHILS # BLD AUTO: 2.8 K/UL (ref 1.82–7.42)
NEUTROPHILS NFR BLD: 52.8 % (ref 44–72)
NITRITE UR QL STRIP.AUTO: NEGATIVE
NRBC # BLD AUTO: 0 K/UL
NRBC BLD AUTO-RTO: 0 /100 WBC
PH UR STRIP.AUTO: 6.5 [PH]
PLATELET # BLD AUTO: 247 K/UL (ref 164–446)
PMV BLD AUTO: 9.9 FL (ref 9–12.9)
POTASSIUM SERPL-SCNC: 4.3 MMOL/L (ref 3.6–5.5)
PROT SERPL-MCNC: 6.6 G/DL (ref 6–8.2)
PROT UR QL STRIP: NEGATIVE MG/DL
RBC # BLD AUTO: 4.62 M/UL (ref 4.7–6.1)
RBC UR QL AUTO: NEGATIVE
SODIUM SERPL-SCNC: 138 MMOL/L (ref 135–145)
SP GR UR STRIP.AUTO: 1.02
TRIGL SERPL-MCNC: 67 MG/DL (ref 0–149)
UROBILINOGEN UR STRIP.AUTO-MCNC: 0.2 MG/DL
WBC # BLD AUTO: 5.3 K/UL (ref 4.8–10.8)

## 2017-07-10 PROCEDURE — 36415 COLL VENOUS BLD VENIPUNCTURE: CPT

## 2017-07-10 PROCEDURE — 85025 COMPLETE CBC W/AUTO DIFF WBC: CPT

## 2017-07-10 PROCEDURE — 80061 LIPID PANEL: CPT

## 2017-07-10 PROCEDURE — 81003 URINALYSIS AUTO W/O SCOPE: CPT

## 2017-07-10 PROCEDURE — 80053 COMPREHEN METABOLIC PANEL: CPT

## 2017-07-11 ENCOUNTER — TELEPHONE (OUTPATIENT)
Dept: MEDICAL GROUP | Facility: PHYSICIAN GROUP | Age: 67
End: 2017-07-11

## 2017-07-11 NOTE — TELEPHONE ENCOUNTER
----- Message from ANCELMO Humphreys sent at 7/11/2017  7:03 AM PDT -----  Please call pt and give lab results: Urinalysis is within normal limits. Electrolytes, liver, kidney function, fasting blood sugar within normal limits. Total cholesterol is 204, triglycerides are 67, HDL is 55, LDL is 136 these numbers are all notably improved from one year ago! There is no evidence of anemia or infection on CBC.

## 2017-07-11 NOTE — TELEPHONE ENCOUNTER
VOICEMAIL  1. Caller Name: Vickey Turner                        Call Back Number: [phone] (home) [phone] (work)      2. Message: Called and left a message for patient to call back to get lab results. LM     3. Patient approves office to leave a detailed voicemail/MyChart message: no and N\A

## 2017-07-11 NOTE — TELEPHONE ENCOUNTER
Called and spoke with patient regarding lab results. Patient had no questions at this time. PILAR

## 2017-07-17 DIAGNOSIS — J06.9 ACUTE URI: ICD-10-CM

## 2017-07-17 RX ORDER — FLUTICASONE PROPIONATE 50 MCG
1 SPRAY, SUSPENSION (ML) NASAL DAILY
Qty: 16 G | Refills: 6 | Status: SHIPPED | OUTPATIENT
Start: 2017-07-17 | End: 2018-02-06 | Stop reason: SDUPTHER

## 2017-07-27 ENCOUNTER — HOSPITAL ENCOUNTER (OUTPATIENT)
Dept: RADIOLOGY | Facility: MEDICAL CENTER | Age: 67
End: 2017-07-27
Attending: SPECIALIST
Payer: MEDICARE

## 2017-07-27 DIAGNOSIS — K74.60 HEPATIC CIRRHOSIS, UNSPECIFIED HEPATIC CIRRHOSIS TYPE (HCC): ICD-10-CM

## 2017-07-27 PROCEDURE — 76700 US EXAM ABDOM COMPLETE: CPT

## 2017-08-14 ENCOUNTER — APPOINTMENT (OUTPATIENT)
Dept: MEDICAL GROUP | Facility: PHYSICIAN GROUP | Age: 67
End: 2017-08-14
Payer: MEDICARE

## 2017-08-25 ENCOUNTER — OFFICE VISIT (OUTPATIENT)
Dept: MEDICAL GROUP | Facility: PHYSICIAN GROUP | Age: 67
End: 2017-08-25
Payer: MEDICARE

## 2017-08-25 VITALS
TEMPERATURE: 98.6 F | BODY MASS INDEX: 24.01 KG/M2 | RESPIRATION RATE: 16 BRPM | SYSTOLIC BLOOD PRESSURE: 112 MMHG | WEIGHT: 153 LBS | DIASTOLIC BLOOD PRESSURE: 68 MMHG | HEART RATE: 78 BPM | HEIGHT: 67 IN | OXYGEN SATURATION: 98 %

## 2017-08-25 DIAGNOSIS — F12.90 MARIJUANA USE: ICD-10-CM

## 2017-08-25 DIAGNOSIS — F51.01 PRIMARY INSOMNIA: ICD-10-CM

## 2017-08-25 DIAGNOSIS — M51.36 DDD (DEGENERATIVE DISC DISEASE), LUMBAR: ICD-10-CM

## 2017-08-25 DIAGNOSIS — R35.0 URINARY FREQUENCY: ICD-10-CM

## 2017-08-25 DIAGNOSIS — K21.00 GASTROESOPHAGEAL REFLUX DISEASE WITH ESOPHAGITIS: ICD-10-CM

## 2017-08-25 DIAGNOSIS — J30.1 SEASONAL ALLERGIC RHINITIS DUE TO POLLEN: ICD-10-CM

## 2017-08-25 PROCEDURE — 99214 OFFICE O/P EST MOD 30 MIN: CPT | Performed by: NURSE PRACTITIONER

## 2017-08-25 RX ORDER — RANITIDINE 150 MG/1
150 TABLET ORAL 2 TIMES DAILY
Qty: 180 TAB | Refills: 3 | Status: SHIPPED | OUTPATIENT
Start: 2017-08-25 | End: 2018-11-05 | Stop reason: SDUPTHER

## 2017-08-25 RX ORDER — ZOLPIDEM TARTRATE 10 MG/1
TABLET ORAL
Qty: 30 TAB | Refills: 2 | Status: SHIPPED | OUTPATIENT
Start: 2017-08-25 | End: 2018-02-26 | Stop reason: SDUPTHER

## 2017-08-25 ASSESSMENT — PAIN SCALES - GENERAL: PAINLEVEL: NO PAIN

## 2017-08-25 NOTE — ASSESSMENT & PLAN NOTE
Chronic in nature. Stable. Patient continues to use Flonase, over-the-counter medication. Denies itchy eyes, runny nose.

## 2017-08-25 NOTE — MR AVS SNAPSHOT
"        Vickey Turner   2017 8:20 AM   Office Visit   MRN: [id number]    Department:  [facility]   Dept Phone:  [phone]    Description:  Male : 1950   Provider:  ANCELMO Humphreys           Reason for Visit     Follow-Up     Medication Refill Ranitidine, zolpidem      Allergies as of 2017     Allergen Noted Reactions    Pantoprazole 2015   Rash    itching    Pantoprazole 2016         You were diagnosed with     Primary insomnia   [420934]       Gastroesophageal reflux disease with esophagitis   [5100284]         Vital Signs     Blood Pressure Pulse Temperature Respirations Height Weight    112/68 mmHg 78 37 °C (98.6 °F) 16 1.702 m (5' 7.01\") 69.4 kg (153 lb)    Body Mass Index Oxygen Saturation Smoking Status             23.96 kg/m2 98% Never Smoker          Basic Information     Date Of Birth Sex Race Ethnicity Preferred Language    1950 Male White Non- English      Problem List              ICD-10-CM Priority Class Noted - Resolved    Seasonal allergies J30.2   10/29/2014 - Present    Marijuana use F12.10   2014 - Present    Diverticulitis K57.92   2014 - Present    Insomnia G47.00   2014 - Present    Alcohol dependence (CMS-HCC) F10.20   2014 - Present    Lumbar radiculopathy M54.16   5/15/2015 - Present    DDD (degenerative disc disease), lumbar M51.36   5/15/2015 - Present    Chronic back pain M54.9, G89.29   5/15/2015 - Present    Gastroesophageal reflux disease with esophagitis K21.0   2015 - Present    Preventative health care Z00.00   3/14/2016 - Present    Controlled substance agreement signed Z79.899   2016 - Present    Urinary frequency R35.0   2017 - Present    Alcoholic liver disease, unspecified (CMS-HCC) K70.9   2017 - Present      Health Maintenance        Date Due Completion Dates    IMM ZOSTER VACCINE 3/17/2012 ---    IMM PNEUMOCOCCAL 65+ (ADULT) LOW/MEDIUM RISK SERIES (1 of 2 - " PCV13) 3/17/2017 ---    IMM INFLUENZA (1) 9/1/2017 ---    IMM DTaP/Tdap/Td Vaccine (2 - Td) 2/16/2021 2/16/2011    COLONOSCOPY 11/17/2024 11/17/2014, 11/16/2014            Current Immunizations     Tdap Vaccine 2/16/2011      Below and/or attached are the medications your provider expects you to take. Review all of your home medications and newly ordered medications with your provider and/or pharmacist. Follow medication instructions as directed by your provider and/or pharmacist. Please keep your medication list with you and share with your provider. Update the information when medications are discontinued, doses are changed, or new medications (including over-the-counter products) are added; and carry medication information at all times in the event of emergency situations     Allergies:  PANTOPRAZOLE - Rash     PANTOPRAZOLE - (reactions not documented)               Medications  Valid as of: August 25, 2017 -  8:40 AM    Generic Name Brand Name Tablet Size Instructions for use    Aspirin   Take  by mouth.        Cholecalciferol (Tab) cholecalciferol 1000 UNIT Take 1,000 Units by mouth every day.        Fluticasone Propionate (Suspension) FLONASE 50 MCG/ACT Spray 1 Spray in nose every day.        RaNITidine HCl (Tab) ZANTAC 150 MG Take 1 Tab by mouth 2 times a day. Indications: Gastroesophageal Reflux Disease        Zolpidem Tartrate (Tab) AMBIEN 10 MG TAKE 1 TABLET BY MOUTH AT BEDTIME AS NEEDED FOR SLEEP        .                 Medicines prescribed today were sent to:     [facility] [address] [place] [address]), [place] - [address]    [address] () [place] [address]    Phone: [phone] Fax: [phone]    Open 24 Hours?: No      Medication refill instructions:       If your prescription bottle indicates you have medication refills left, it is not necessary to call your provider’s office. Please contact your pharmacy and they will refill your medication.    If your prescription bottle indicates you  do not have any refills left, you may request refills at any time through one of the following ways: The online [employer] system (except Urgent Care), by calling your provider’s office, or by asking your pharmacy to contact your provider’s office with a refill request. Medication refills are processed only during regular business hours and may not be available until the next business day. Your provider may request additional information or to have a follow-up visit with you prior to refilling your medication.   *Please Note: Medication refills are assigned a new Rx number when refilled electronically. Your pharmacy may indicate that no refills were authorized even though a new prescription for the same medication is available at the pharmacy. Please request the medicine by name with the pharmacy before contacting your provider for a refill.           [employer] Access Code: Activation code not generated  Current [employer] Status: Active

## 2017-08-25 NOTE — ASSESSMENT & PLAN NOTE
Chronic in nature. Stable. Difficulty with mind racing. States that Ambien often take at 8 hours of sleep. Discussed risks and benefits of Ambien. Discussed sleep hygiene. Patient does not drink or use other sedating medications or drive while taking this medication. Refilled and can provide at this time.

## 2017-08-25 NOTE — ASSESSMENT & PLAN NOTE
Patient has an appointment with urology regarding this issue. States that symptoms are unchanged at this time.

## 2017-08-25 NOTE — ASSESSMENT & PLAN NOTE
Chronic in nature. Stable. Continues to follow with pain management. States that he is no longer taking hydrocodone.

## 2017-08-25 NOTE — PROGRESS NOTES
Chief Complaint   Patient presents with   • Follow-Up   • Medication Refill     Ranitidine, zolpidem       HISTORY OF PRESENT ILLNESS: Patient is a 67 y.o. male established patient who presents today to discuss multiple.    Marijuana use  Continues to use marijuana.    Seasonal allergies  Chronic in nature. Stable. Patient continues to use Flonase, over-the-counter medication. Denies itchy eyes, runny nose.    DDD (degenerative disc disease), lumbar  Chronic in nature. Stable. Continues to follow with pain management. States that he is no longer taking hydrocodone.    Urinary frequency  Patient has an appointment with urology regarding this issue. States that symptoms are unchanged at this time.    Insomnia  Chronic in nature. Stable. Difficulty with mind racing. States that Ambien often take at 8 hours of sleep. Discussed risks and benefits of Ambien. Discussed sleep hygiene. Patient does not drink or use other sedating medications or drive while taking this medication. Refilled and can provide at this time.      Patient Active Problem List    Diagnosis Date Noted   • Urinary frequency 06/29/2017   • Alcoholic liver disease, unspecified (CMS-HCC) 06/29/2017   • Controlled substance agreement signed 04/27/2016   • Preventative health care 03/14/2016   • Gastroesophageal reflux disease with esophagitis 08/05/2015   • Lumbar radiculopathy 05/15/2015   • DDD (degenerative disc disease), lumbar 05/15/2015   • Chronic back pain 05/15/2015   • Diverticulitis 11/19/2014   • Insomnia 11/19/2014   • Alcohol dependence (CMS-HCC) 11/19/2014   • Marijuana use 11/18/2014   • Seasonal allergies 10/29/2014       Allergies:Pantoprazole and Pantoprazole    Current Outpatient Prescriptions   Medication Sig Dispense Refill   • zolpidem (AMBIEN) 10 MG Tab TAKE 1 TABLET BY MOUTH AT BEDTIME AS NEEDED FOR SLEEP 30 Tab 2   • ranitidine (ZANTAC) 150 MG Tab Take 1 Tab by mouth 2 times a day. Indications: Gastroesophageal Reflux Disease 180  "Tab 3   • fluticasone (FLONASE) 50 MCG/ACT nasal spray Spray 1 Spray in nose every day. 16 g 6   • BABY ASPIRIN PO Take  by mouth.     • vitamin D (CHOLECALCIFEROL) 1000 UNIT Tab Take 1,000 Units by mouth every day.       No current facility-administered medications for this visit.       Social History   Substance Use Topics   • Smoking status: Never Smoker    • Smokeless tobacco: Never Used      Comment: +second-hand exposure   • Alcohol Use: No      Comment: quit 3 years        Family Status   Relation Status Death Age   • Mother Alive    • Father     • Brother Alive    History reviewed. No pertinent family history.    Review of Systems:   Constitutional:  Negative for fever, chills, weight loss and malaise/fatigue.   HEENT:  Negative for ear pain, nosebleeds, congestion, sore throat and neck pain.    Eyes:  Negative for blurred vision.   Respiratory:  Negative for cough, sputum production, shortness of breath and wheezing.    Cardiovascular:  Negative for chest pain, palpitations, orthopnea and leg swelling.   Gastrointestinal:  Negative for heartburn, nausea, vomiting and abdominal pain.   Genitourinary:  Negative for dysuria, urgency and frequency.   Musculoskeletal:Positive for myalgias, back pain and joint pain.   Skin:  Negative for rash and itching.   Neurological:  Negative for dizziness, tingling, tremors, sensory change, focal weakness and headaches.   Endo/Heme/Allergies:  Does not bruise/bleed easily.   Psychiatric/Behavioral:  Negative for depression, suicidal ideas and memory loss.  The patient is not nervous/anxious and does not have insomnia.    All other systems reviewed and are negative except as in HPI.    Exam:  Blood pressure 112/68, pulse 78, temperature 37 °C (98.6 °F), resp. rate 16, height 1.702 m (5' 7.01\"), weight 69.4 kg (153 lb), SpO2 98 %.  General:  Normal appearing. No distress.  Pulmonary:  Clear to ausculation.  Normal effort. No rales, ronchi, or " wheezing.  Cardiovascular:  Regular rate and rhythm without murmur. Carotid and radial pulses are intact and equal bilaterally.  Neurologic:  Grossly nonfocal  Skin:  Warm and dry.  No obvious lesions.  Musculoskeletal:  Normal gait. No extremity cyanosis, clubbing, or edema.  Psych:  Normal mood and affect. Alert and oriented x3. Judgment and insight is normal.      PLAN:    1. Primary insomnia  Discuss sleep hygiene, no screens 2 hours before bed, relaxation techniques, stopping caffeine 2 hours before bedtime  - zolpidem (AMBIEN) 10 MG Tab; TAKE 1 TABLET BY MOUTH AT BEDTIME AS NEEDED FOR SLEEP  Dispense: 30 Tab; Refill: 2    2. Gastroesophageal reflux disease with esophagitis  Continue current management patient states that he does not have a cough at this time refuses follow-up with GI  - ranitidine (ZANTAC) 150 MG Tab; Take 1 Tab by mouth 2 times a day. Indications: Gastroesophageal Reflux Disease  Dispense: 180 Tab; Refill: 3    3. Marijuana use  Counseled patient regarding adverse effects    4. Seasonal allergic rhinitis due to pollen  Continue current management including Flonase.    5. DDD (degenerative disc disease), lumbar  Continue follow-up with specialists.    6. Urinary frequency  Keep appointment with urology.    Follow-up in 3 months or as needed. Patient is encouraged to be seen in the emergency room for chest pain, palpitations, shortness of breath, dizziness, severe abdominal pain or other concerning symptoms.      Please note that this dictation was created using voice recognition software. I have made every reasonable attempt to correct obvious errors, but I expect that there are errors of grammar and possibly content that I did not discover before finalizing the note.    Assessment/Plan:  1. Primary insomnia  zolpidem (AMBIEN) 10 MG Tab   2. Gastroesophageal reflux disease with esophagitis  ranitidine (ZANTAC) 150 MG Tab   3. Marijuana use     4. Seasonal allergic rhinitis due to pollen     5.  DDD (degenerative disc disease), lumbar     6. Urinary frequency            I have placed the below orders and discussed them with an approved delegating provider. The MA is performing the below orders under the direction of Dr. Frank.

## 2017-09-14 ENCOUNTER — HOSPITAL ENCOUNTER (OUTPATIENT)
Facility: MEDICAL CENTER | Age: 67
End: 2017-09-14
Attending: PHYSICIAN ASSISTANT
Payer: MEDICARE

## 2017-09-14 LAB — PSA SERPL-MCNC: 1.41 NG/ML (ref 0–4)

## 2017-09-14 PROCEDURE — 84153 ASSAY OF PSA TOTAL: CPT

## 2017-10-06 ENCOUNTER — OFFICE VISIT (OUTPATIENT)
Dept: MEDICAL GROUP | Facility: PHYSICIAN GROUP | Age: 67
End: 2017-10-06
Payer: MEDICARE

## 2017-10-06 VITALS
BODY MASS INDEX: 23.54 KG/M2 | WEIGHT: 150 LBS | RESPIRATION RATE: 16 BRPM | HEART RATE: 74 BPM | HEIGHT: 67 IN | OXYGEN SATURATION: 97 % | DIASTOLIC BLOOD PRESSURE: 74 MMHG | TEMPERATURE: 98.2 F | SYSTOLIC BLOOD PRESSURE: 124 MMHG

## 2017-10-06 DIAGNOSIS — M54.50 CHRONIC BILATERAL LOW BACK PAIN WITHOUT SCIATICA: ICD-10-CM

## 2017-10-06 DIAGNOSIS — Z00.00 PREVENTATIVE HEALTH CARE: ICD-10-CM

## 2017-10-06 DIAGNOSIS — Z23 NEED FOR VACCINATION: ICD-10-CM

## 2017-10-06 DIAGNOSIS — M51.36 DDD (DEGENERATIVE DISC DISEASE), LUMBAR: ICD-10-CM

## 2017-10-06 DIAGNOSIS — J30.1 CHRONIC SEASONAL ALLERGIC RHINITIS DUE TO POLLEN: ICD-10-CM

## 2017-10-06 DIAGNOSIS — G89.29 CHRONIC BILATERAL LOW BACK PAIN WITHOUT SCIATICA: ICD-10-CM

## 2017-10-06 DIAGNOSIS — F12.90 MARIJUANA USE: ICD-10-CM

## 2017-10-06 PROCEDURE — 90670 PCV13 VACCINE IM: CPT | Performed by: NURSE PRACTITIONER

## 2017-10-06 PROCEDURE — G0009 ADMIN PNEUMOCOCCAL VACCINE: HCPCS | Performed by: NURSE PRACTITIONER

## 2017-10-06 PROCEDURE — 99214 OFFICE O/P EST MOD 30 MIN: CPT | Mod: 25 | Performed by: NURSE PRACTITIONER

## 2017-10-06 ASSESSMENT — PAIN SCALES - GENERAL: PAINLEVEL: NO PAIN

## 2017-10-06 NOTE — ASSESSMENT & PLAN NOTE
Counseled patient extensively about the importance of flu shots, pneumonia shots, shingles vaccine counseled patient regarding pneumonia, flu, shingles and the dangers of giorgio these diseases.

## 2017-10-06 NOTE — PROGRESS NOTES
Chief Complaint   Patient presents with   • Immunizations       HISTORY OF PRESENT ILLNESS: Patient is a 67 y.o. male established patient who presents today to discuss multiple issues and vaccination.    Seasonal allergies  Chronic in nature. Stable. Patient continues to use over-the-counter medications as needed. Denies itchy eyes, runny nose.    Marijuana use  Counseled patient regarding risks, benefits, side effects of marijuana use discussed smoking cessation and lung health. Patient continues to smoke marijuana    DDD (degenerative disc disease), lumbar  Chronic in nature. Stable. Patient continues to follow with pain management.    Chronic back pain  Chronic in nature. Stable. Patient continues to follow with pain management.    Preventative health care  Counseled patient extensively about the importance of flu shots, pneumonia shots, shingles vaccine counseled patient regarding pneumonia, flu, shingles and the dangers of giorgio these diseases.       Patient Active Problem List    Diagnosis Date Noted   • Urinary frequency 06/29/2017   • Alcoholic liver disease, unspecified 06/29/2017   • Controlled substance agreement signed 04/27/2016   • Preventative health care 03/14/2016   • Gastroesophageal reflux disease with esophagitis 08/05/2015   • Lumbar radiculopathy 05/15/2015   • DDD (degenerative disc disease), lumbar 05/15/2015   • Chronic back pain 05/15/2015   • Diverticulitis 11/19/2014   • Insomnia 11/19/2014   • Alcohol dependence (CMS-[facility]) 11/19/2014   • Marijuana use 11/18/2014   • Seasonal allergies 10/29/2014       Allergies:Pantoprazole and Pantoprazole    Current Outpatient Prescriptions   Medication Sig Dispense Refill   • Zoster Vaccine Live (ZOSTAVAX) 93680 UNT/0.65ML Recon Susp Inject 0.65 mL as instructed Once for 1 dose. 0.65 mL 0   • ranitidine (ZANTAC) 150 MG Tab Take 1 Tab by mouth 2 times a day. Indications: Gastroesophageal Reflux Disease 180 Tab 3   • fluticasone (FLONASE) 50  "MCG/ACT nasal spray Spray 1 Spray in nose every day. 16 g 6   • BABY ASPIRIN PO Take  by mouth.     • vitamin D (CHOLECALCIFEROL) 1000 UNIT Tab Take 1,000 Units by mouth every day.     • zolpidem (AMBIEN) 10 MG Tab TAKE 1 TABLET BY MOUTH AT BEDTIME AS NEEDED FOR SLEEP 30 Tab 2     No current facility-administered medications for this visit.        Social History   Substance Use Topics   • Smoking status: Never Smoker   • Smokeless tobacco: Never Used      Comment: +second-hand exposure   • Alcohol use No      Comment: quit 3 years        Family Status   Relation Status   • Mother Alive   • Father    • Brother Alive   History reviewed. No pertinent family history.    Review of Systems:   Constitutional:  Negative for fever, chills, weight loss and malaise/fatigue.   HEENT:  Negative for ear pain, nosebleeds, congestion, sore throat and neck pain.    Eyes:  Negative for blurred vision.   Respiratory:  Negative for cough, sputum production, shortness of breath and wheezing.    Cardiovascular:  Negative for chest pain, palpitations, orthopnea and leg swelling.   Gastrointestinal:  Negative for heartburn, nausea, vomiting and abdominal pain.   Genitourinary:  Negative for dysuria, urgency and frequency.   Musculoskeletal:  Negative for myalgias, back pain and joint pain.   Skin:  Negative for rash and itching.   Neurological:  Negative for dizziness, tingling, tremors, sensory change, focal weakness and headaches.   Endo/Heme/Allergies:  Does not bruise/bleed easily.   Psychiatric/Behavioral:  Negative for depression, suicidal ideas and memory loss.  The patient is not nervous/anxious and does not have insomnia.    All other systems reviewed and are negative except as in HPI.    Exam:  Blood pressure 124/74, pulse 74, temperature 36.8 °C (98.2 °F), resp. rate 16, height 1.702 m (5' 7.01\"), weight 68 kg (150 lb), SpO2 97 %.  General:  Normal appearing. No distress.  Pulmonary:  Clear to ausculation.  Normal " effort. No rales, ronchi, or wheezing.  Cardiovascular:  Regular rate and rhythm without murmur. Carotid and radial pulses are intact and equal bilaterally.  Neurologic:  Grossly nonfocal  Skin:  Warm and dry.  No obvious lesions.  Musculoskeletal:  Normal gait. No extremity cyanosis, clubbing, or edema.  Psych:  Normal mood and affect. Alert and oriented x3. Judgment and insight is normal.      PLAN:    1. Need for vaccination  - Prevnar 13 PCV-13    2. Chronic seasonal allergic rhinitis due to pollen  Continue over-the-counter merit medication as needed    3. Marijuana use  Recommended smoking cessation    4. DDD (degenerative disc disease), lumbar  Continue follow-up with pain management    5. Chronic bilateral low back pain without sciatica  Continue follow-up with pain management    6. Preventative health care  Patient declines flu vaccine at this time  Prevnar 13 and is provided. Prescription for shingles vaccine is given to patient at this time.    Follow-up in 6 months or sooner as needed. Patient is encouraged to be seen in the emergency room for chest pain, palpitations, shortness of breath, dizziness, severe abdominal pain or other concerning symptoms.      Please note that this dictation was created using voice recognition software. I have made every reasonable attempt to correct obvious errors, but I expect that there are errors of grammar and possibly content that I did not discover before finalizing the note.    Assessment/Plan:  1. Need for vaccination  Prevnar 13 PCV-13   2. Chronic seasonal allergic rhinitis due to pollen     3. Marijuana use     4. DDD (degenerative disc disease), lumbar     5. Chronic bilateral low back pain without sciatica     6. Preventative health care            I have placed the below orders and discussed them with an approved delegating provider. The MA is performing the below orders under the direction of Dr. Taylor.

## 2017-10-06 NOTE — ASSESSMENT & PLAN NOTE
Chronic in nature. Stable. Patient continues to use over-the-counter medications as needed. Denies itchy eyes, runny nose.

## 2017-10-06 NOTE — ASSESSMENT & PLAN NOTE
Counseled patient regarding risks, benefits, side effects of marijuana use discussed smoking cessation and lung health. Patient continues to smoke marijuana

## 2017-10-16 ENCOUNTER — TELEPHONE (OUTPATIENT)
Dept: MEDICAL GROUP | Facility: PHYSICIAN GROUP | Age: 67
End: 2017-10-16

## 2017-10-16 ENCOUNTER — NON-PROVIDER VISIT (OUTPATIENT)
Dept: MEDICAL GROUP | Facility: PHYSICIAN GROUP | Age: 67
End: 2017-10-16
Payer: MEDICARE

## 2017-10-16 DIAGNOSIS — Z23 NEED FOR VACCINATION: ICD-10-CM

## 2017-10-16 PROCEDURE — 90662 IIV NO PRSV INCREASED AG IM: CPT | Performed by: NURSE PRACTITIONER

## 2017-10-16 PROCEDURE — G0008 ADMIN INFLUENZA VIRUS VAC: HCPCS | Performed by: NURSE PRACTITIONER

## 2017-10-16 NOTE — PROGRESS NOTES
"Vickey Turner is a 67 y.o. male here for a non-provider visit for:   FLU    Reason for immunization: Annual Flu Vaccine  Immunization records indicate need for vaccine: Yes, confirmed with Epic  Minimum interval has been met for this vaccine: Yes  ABN completed: Yes    Order and dose verified by: MB  VIS Dated  8/7/15 was given to patient: Yes  All IAC Questionnaire questions were answered \"No.\"    Patient tolerated injection and no adverse effects were observed or reported: Yes    Pt scheduled for next dose in series: Yes    "

## 2017-10-16 NOTE — TELEPHONE ENCOUNTER
Patient is here at the [facility]. Patient said he went to [facility] to get his Zoster vaccine and you gave him  Written RX. I think the vaccine is not valid anymore because it has start date 10/06/2017 and end date 10/06/2017. PILAR

## 2017-11-27 ENCOUNTER — OFFICE VISIT (OUTPATIENT)
Dept: MEDICAL GROUP | Facility: PHYSICIAN GROUP | Age: 67
End: 2017-11-27
Payer: MEDICARE

## 2017-11-27 VITALS
DIASTOLIC BLOOD PRESSURE: 70 MMHG | BODY MASS INDEX: 23.7 KG/M2 | WEIGHT: 151 LBS | HEART RATE: 73 BPM | OXYGEN SATURATION: 96 % | TEMPERATURE: 98.2 F | SYSTOLIC BLOOD PRESSURE: 124 MMHG | RESPIRATION RATE: 16 BRPM | HEIGHT: 67 IN

## 2017-11-27 DIAGNOSIS — R05.9 COUGH: ICD-10-CM

## 2017-11-27 PROCEDURE — 99213 OFFICE O/P EST LOW 20 MIN: CPT | Performed by: NURSE PRACTITIONER

## 2017-11-27 RX ORDER — CODEINE PHOSPHATE/GUAIFENESIN 10-100MG/5
5 LIQUID (ML) ORAL 3 TIMES DAILY PRN
Qty: 120 ML | Refills: 0 | Status: SHIPPED | OUTPATIENT
Start: 2017-11-27 | End: 2018-04-24

## 2017-11-27 ASSESSMENT — PAIN SCALES - GENERAL: PAINLEVEL: NO PAIN

## 2017-11-27 NOTE — ASSESSMENT & PLAN NOTE
This is a new problem. Cough started 2 weeks ago. States he is having thick foul-tasting sputum, states it is brown/yellow. Notices it most in the morning. States he is hydrating well. States he is still having  fevers on and off. + sick contacts patient is a . Uses flonase for congestion. Cough, throat itching, weakness/tiredness, SOB. States he does have some mild body aches. States that he feels like symptoms are getting better but have been lingering and he is concerned about the continued sputum.

## 2017-11-27 NOTE — PROGRESS NOTES
Chief Complaint   Patient presents with   • Cough     x 1 week    • Fever     last week on and off    • Generalized Body Aches       HISTORY OF PRESENT ILLNESS: Patient is a 67 y.o. male established patient who presents today to discuss a cough.    Cough  This is a new problem. Cough started 2 weeks ago. States he is having thick foul-tasting sputum, states it is brown/yellow. Notices it most in the morning. States he is hydrating well. States he is still having  fevers on and off. + sick contacts patient is a . Uses flonase for congestion. Cough, throat itching, weakness/tiredness, SOB. States he does have some mild body aches. States that he feels like symptoms are getting better but have been lingering and he is concerned about the continued sputum.      Patient Active Problem List    Diagnosis Date Noted   • Cough 11/27/2017   • Urinary frequency 06/29/2017   • Alcoholic liver disease, unspecified 06/29/2017   • Controlled substance agreement signed 04/27/2016   • Preventative health care 03/14/2016   • Gastroesophageal reflux disease with esophagitis 08/05/2015   • Lumbar radiculopathy 05/15/2015   • DDD (degenerative disc disease), lumbar 05/15/2015   • Chronic back pain 05/15/2015   • Diverticulitis 11/19/2014   • Insomnia 11/19/2014   • Alcohol dependence (CMS-[facility]) 11/19/2014   • Marijuana use 11/18/2014   • Seasonal allergies 10/29/2014       Allergies:Pantoprazole and Pantoprazole    Current Outpatient Prescriptions   Medication Sig Dispense Refill   • guaifenesin-codeine (TUSSI-ORGANIDIN NR) 100-10 MG/5ML syrup Take 5 mL by mouth 3 times a day as needed for Cough. 120 mL 0   • zolpidem (AMBIEN) 10 MG Tab TAKE 1 TABLET BY MOUTH AT BEDTIME AS NEEDED FOR SLEEP 30 Tab 2   • ranitidine (ZANTAC) 150 MG Tab Take 1 Tab by mouth 2 times a day. Indications: Gastroesophageal Reflux Disease 180 Tab 3   • fluticasone (FLONASE) 50 MCG/ACT nasal spray Spray 1 Spray in nose every day. 16 g 6   • BABY ASPIRIN PO  "Take  by mouth.     • vitamin D (CHOLECALCIFEROL) 1000 UNIT Tab Take 1,000 Units by mouth every day.       No current facility-administered medications for this visit.        Social History   Substance Use Topics   • Smoking status: Never Smoker   • Smokeless tobacco: Never Used      Comment: +second-hand exposure   • Alcohol use No      Comment: quit 3 years        Family Status   Relation Status   • Mother Alive   • Father    • Brother Alive   History reviewed. No pertinent family history.    Review of Systems:   Constitutional:Positive for fever, negative chills, weight loss and malaise/fatigue.   HEENT:  Negative for ear pain, nosebleeds, congestion, sore throat and neck pain.    Eyes:  Negative for blurred vision.   Respiratory:  Positive for cough, negative sputum production, positive mild shortness of breath and negative wheezing.    Cardiovascular:  Negative for chest pain, palpitations, orthopnea and leg swelling.   Gastrointestinal:  Negative for heartburn, nausea, vomiting and abdominal pain.   Genitourinary:  Negative for dysuria, urgency and frequency.   Musculoskeletal:  Negative for myalgias, back pain and joint pain.   Skin:  Negative for rash and itching.   Neurological:  Negative for dizziness, tingling, tremors, sensory change, focal weakness and headaches.   Endo/Heme/Allergies:  Does not bruise/bleed easily.   Psychiatric/Behavioral:  Negative for depression, suicidal ideas and memory loss.  The patient is not nervous/anxious and does not have insomnia.    All other systems reviewed and are negative except as in HPI.    Exam:  Blood pressure 124/70, pulse 73, temperature 36.8 °C (98.2 °F), resp. rate 16, height 1.702 m (5' 7.01\"), weight 68.5 kg (151 lb), SpO2 96 %.  General:  Normal appearing. No distress.  Pulmonary:  Clear to ausculation.  Normal effort. No rales, ronchi, or wheezing.  Cardiovascular:  Regular rate and rhythm without murmur. Carotid and radial pulses are intact and " equal bilaterally.  Neurologic:  Grossly nonfocal  Lymph:  No cervical, supraclavicular or axillary lymph nodes are palpable  Skin:  Warm and dry.  No obvious lesions.  Musculoskeletal:  Normal gait. No extremity cyanosis, clubbing, or edema.  Psych:  Normal mood and affect. Alert and oriented x3. Judgment and insight is normal.      PLAN:    1. Cough  Patient has no signs or symptoms of bacterial infection other than change in sputum plan have patient complete chest x-ray, medication provided for cough that's waking him up at night. Patient will follow up if symptoms worsen or do not improve patient does state that these are improving at this time.  - DX-CHEST-2 VIEWS; Future  - guaifenesin-codeine (TUSSI-ORGANIDIN NR) 100-10 MG/5ML syrup; Take 5 mL by mouth 3 times a day as needed for Cough.  Dispense: 120 mL; Refill: 0    Follow-up in 6 months or sooner as needed. Patient is encouraged to be seen in the emergency room for chest pain, palpitations, shortness of breath, dizziness, severe abdominal pain or other concerning symptoms.    Please note that this dictation was created using voice recognition software. I have made every reasonable attempt to correct obvious errors, but I expect that there are errors of grammar and possibly content that I did not discover before finalizing the note.    Assessment/Plan:  1. Cough  DX-CHEST-2 VIEWS    guaifenesin-codeine (TUSSI-ORGANIDIN NR) 100-10 MG/5ML syrup          I have placed the below orders and discussed them with an approved delegating provider. The MA is performing the below orders under the direction of Dr. Frank.

## 2017-12-04 ENCOUNTER — OFFICE VISIT (OUTPATIENT)
Dept: URGENT CARE | Facility: CLINIC | Age: 67
End: 2017-12-04
Payer: MEDICARE

## 2017-12-04 VITALS
BODY MASS INDEX: 22.36 KG/M2 | HEART RATE: 80 BPM | WEIGHT: 151 LBS | TEMPERATURE: 97.6 F | RESPIRATION RATE: 18 BRPM | SYSTOLIC BLOOD PRESSURE: 116 MMHG | DIASTOLIC BLOOD PRESSURE: 80 MMHG | HEIGHT: 69 IN | OXYGEN SATURATION: 96 %

## 2017-12-04 DIAGNOSIS — J22 LRTI (LOWER RESPIRATORY TRACT INFECTION): ICD-10-CM

## 2017-12-04 PROCEDURE — 99214 OFFICE O/P EST MOD 30 MIN: CPT | Performed by: FAMILY MEDICINE

## 2017-12-04 RX ORDER — AZITHROMYCIN 250 MG/1
TABLET, FILM COATED ORAL
Qty: 6 TAB | Refills: 0 | Status: SHIPPED | OUTPATIENT
Start: 2017-12-04 | End: 2018-04-24

## 2017-12-04 RX ORDER — CODEINE PHOSPHATE AND GUAIFENESIN 10; 100 MG/5ML; MG/5ML
5 SOLUTION ORAL EVERY 4 HOURS PRN
Qty: 90 ML | Refills: 0 | Status: SHIPPED | OUTPATIENT
Start: 2017-12-04 | End: 2018-04-24

## 2017-12-04 NOTE — PROGRESS NOTES
Chief Complaint   Patient presents with   • Cough     x2weeks, cough, chest congestion, voice is deep, hot headed, yellow and green phlegm, shot breathes              Cough  This is a new problem. The current episode started 2 wks ago. The problem has been unchanged. The problem occurs constantly. The cough is productive with green, yellow sputum. Associated symptoms include chest congestion, subj fevers. Pertinent negatives include: no sinus pain, no   headaches, nausea, vomiting, diarrhea, sweats, weight loss or wheezing. Nothing aggravates the symptoms.  Patient has tried nothing for the symptoms. There is no history of asthma.          Current Outpatient Prescriptions on File Prior to Visit   Medication Sig Dispense Refill   • guaifenesin-codeine (TUSSI-ORGANIDIN NR) 100-10 MG/5ML syrup Take 5 mL by mouth 3 times a day as needed for Cough. 120 mL 0   • zolpidem (AMBIEN) 10 MG Tab TAKE 1 TABLET BY MOUTH AT BEDTIME AS NEEDED FOR SLEEP 30 Tab 2   • ranitidine (ZANTAC) 150 MG Tab Take 1 Tab by mouth 2 times a day. Indications: Gastroesophageal Reflux Disease 180 Tab 3   • fluticasone (FLONASE) 50 MCG/ACT nasal spray Spray 1 Spray in nose every day. 16 g 6   • BABY ASPIRIN PO Take  by mouth.     • vitamin D (CHOLECALCIFEROL) 1000 UNIT Tab Take 1,000 Units by mouth every day.       No current facility-administered medications on file prior to visit.          Social History   Substance Use Topics   • Smoking status: Never Smoker   • Smokeless tobacco: Never Used      Comment: +second-hand exposure   • Alcohol use No      Comment: quit 3 years            Review of Systems   Constitutional: Negative for fever and weight loss.   HENT: negative for ear pain.    Cardiovascular - denies chest pain or dyspnea  Respiratory: Positive for cough.  .  Negative for wheezing.    Neurological: Negative for headaches.   GI - denies nausea, vomiting or diarrhea  Neuro - denies numbness or tingling.            Objective:     Blood  "pressure 116/80, pulse 80, temperature 36.4 °C (97.6 °F), resp. rate 18, height 1.753 m (5' 9\"), weight 68.5 kg (151 lb), SpO2 96 %.      Physical Exam   Constitutional: patient is oriented to person, place, and time. Patient appears well-developed and well-nourished. No distress.   HENT:   Head: Normocephalic and atraumatic.   Right Ear: External ear normal.   Left Ear: External ear normal.   Nose: Mucosal edema  Present.  No sinus tenderness.    Mouth/Throat: Mucous membranes are normal. No oral lesions. Posterior oropharyngeal erythema present. No oropharyngeal exudate or posterior oropharyngeal edema.   Eyes: Conjunctivae and EOM are normal. Pupils are equal, round, and reactive to light. Right eye exhibits no discharge. Left eye exhibits no discharge. No scleral icterus.   Neck: Normal range of motion. Neck supple. No tracheal deviation present.   Cardiovascular: Normal rate, regular rhythm and normal heart sounds.  Exam reveals no friction rub.    Pulmonary/Chest: Effort normal. No respiratory distress. Patient has no wheezes , but bilat rhonchi. Patient has no rales.    Musculoskeletal:  exhibits no edema.   Lymphadenopathy:     Patient has cervical adenopathy.   Neurological: patient is alert and oriented to person, place, and time.   Skin: Skin is warm and dry. No rash noted. No erythema.   Psychiatric: patient  has a normal mood and affect.  behavior is normal.   Nursing note and vitals reviewed.              Assessment/Plan:       1. LRTI (lower respiratory tract infection)     - guaifenesin-codeine (CHERATUSSIN AC) Solution oral solution; Take 5 mL by mouth every four hours as needed for Cough.  Dispense: 90 mL; Refill: 0  - azithromycin (ZITHROMAX) 250 MG Tab; Take as directed  Dispense: 6 Tab; Refill: 0    Follow up in one week if no improvement, sooner if symptoms worsen.         "

## 2018-02-06 ENCOUNTER — TELEPHONE (OUTPATIENT)
Dept: MEDICAL GROUP | Facility: PHYSICIAN GROUP | Age: 68
End: 2018-02-06

## 2018-02-06 DIAGNOSIS — J06.9 ACUTE URI: ICD-10-CM

## 2018-02-06 RX ORDER — FLUTICASONE PROPIONATE 50 MCG
1 SPRAY, SUSPENSION (ML) NASAL DAILY
Qty: 16 G | Refills: 0 | Status: SHIPPED | OUTPATIENT
Start: 2018-02-06 | End: 2018-10-02

## 2018-02-06 NOTE — TELEPHONE ENCOUNTER
Was the patient seen in the last year in this department? YES     Does patient have an active prescription for medications requested? No     Received Request Via: Patient

## 2018-02-06 NOTE — TELEPHONE ENCOUNTER
Patient came in stating he went to the pharmacy to have his RX refilled, but the pharmacy said they haven't heard anything back. I don't seen any refill requests from the pharmacy. The RX is below. Please have Ryan advise. Thank you!    fluticasone (FLONASE) 50 MCG/ACT nasal spray

## 2018-02-26 ENCOUNTER — TELEPHONE (OUTPATIENT)
Dept: MEDICAL GROUP | Facility: PHYSICIAN GROUP | Age: 68
End: 2018-02-26

## 2018-02-26 DIAGNOSIS — F51.01 PRIMARY INSOMNIA: ICD-10-CM

## 2018-02-26 RX ORDER — ZOLPIDEM TARTRATE 10 MG/1
10 TABLET ORAL NIGHTLY PRN
Qty: 30 TAB | Refills: 3 | Status: SHIPPED | OUTPATIENT
Start: 2018-02-26 | End: 2018-03-28

## 2018-03-29 ENCOUNTER — PATIENT OUTREACH (OUTPATIENT)
Dept: HEALTH INFORMATION MANAGEMENT | Facility: OTHER | Age: 68
End: 2018-03-29

## 2018-03-29 NOTE — PROGRESS NOTES
1. Attempt #: 1    2. [facility] Verified: yes    3. Verify PCP: yes    4. Care Team Updated:       •   DME Company (gait device, O2, CPAP, etc.): YES       •   Other Specialists (eye doctor, derm, GYN, cardiology, endo, etc): YES    5.  Reviewed/Updated the following with patient:       •   Communication Preference Obtained? YES       •   Preferred Pharmacy? YES       •   Preferred Lab? YES       •   Family History (document living status of immediate family members and if + hx of cancer, diabetes, hypertension, hyperlipidemia, heart attack, stroke) YES. Was Abstract Encounter opened and chart updated? YES    6. [employer] Activation: already active    7. [facility] Salazar: no    8. Annual Wellness Visit Scheduling  Scheduling Status:Scheduled      9. Care Gap Scheduling (Attempt to Schedule EACH Overdue Care Gap!)     Health Maintenance Due   Topic Date Due   • Annual Wellness Visit  1950        Scheduled patient for Annual Wellness Visit    10. Patient was advised: “This is a free wellness visit. The provider will screen for medical conditions to help you stay healthy. If you have other concerns to address you may be asked to discuss these at a separate visit or there may be an additional fee.”     11. Patient was informed to arrive 15 min prior to their scheduled appointment and bring in their medication bottles.

## 2018-03-30 ENCOUNTER — APPOINTMENT (OUTPATIENT)
Dept: MEDICAL GROUP | Facility: PHYSICIAN GROUP | Age: 68
End: 2018-03-30
Payer: MEDICARE

## 2018-03-30 ENCOUNTER — TELEPHONE (OUTPATIENT)
Dept: MEDICAL GROUP | Facility: PHYSICIAN GROUP | Age: 68
End: 2018-03-30

## 2018-03-30 NOTE — TELEPHONE ENCOUNTER
Left message for patient to call back regarding pre-visit planning. Please transfer call to Gill at [phone].

## 2018-04-03 NOTE — TELEPHONE ENCOUNTER
Left message for patient to call back regarding pre-visit planning. Please transfer call to Gill at [phone].

## 2018-04-24 ENCOUNTER — OFFICE VISIT (OUTPATIENT)
Dept: MEDICAL GROUP | Facility: PHYSICIAN GROUP | Age: 68
End: 2018-04-24
Payer: MEDICARE

## 2018-04-24 VITALS
SYSTOLIC BLOOD PRESSURE: 105 MMHG | HEART RATE: 74 BPM | OXYGEN SATURATION: 95 % | RESPIRATION RATE: 16 BRPM | BODY MASS INDEX: 22.11 KG/M2 | TEMPERATURE: 98.2 F | DIASTOLIC BLOOD PRESSURE: 70 MMHG | HEIGHT: 69 IN | WEIGHT: 149.25 LBS

## 2018-04-24 DIAGNOSIS — R35.0 URINARY FREQUENCY: ICD-10-CM

## 2018-04-24 DIAGNOSIS — M54.50 CHRONIC BILATERAL LOW BACK PAIN WITHOUT SCIATICA: ICD-10-CM

## 2018-04-24 DIAGNOSIS — K70.9 ALCOHOLIC LIVER DISEASE (HCC): ICD-10-CM

## 2018-04-24 DIAGNOSIS — K57.32 DIVERTICULITIS OF LARGE INTESTINE WITHOUT PERFORATION OR ABSCESS WITHOUT BLEEDING: ICD-10-CM

## 2018-04-24 DIAGNOSIS — F12.90 MARIJUANA USE: ICD-10-CM

## 2018-04-24 DIAGNOSIS — M54.16 LUMBAR RADICULOPATHY: ICD-10-CM

## 2018-04-24 DIAGNOSIS — Z00.00 MEDICARE ANNUAL WELLNESS VISIT, INITIAL: Primary | ICD-10-CM

## 2018-04-24 DIAGNOSIS — F51.01 PRIMARY INSOMNIA: ICD-10-CM

## 2018-04-24 DIAGNOSIS — K21.00 GASTROESOPHAGEAL REFLUX DISEASE WITH ESOPHAGITIS: ICD-10-CM

## 2018-04-24 DIAGNOSIS — G89.29 CHRONIC BILATERAL LOW BACK PAIN WITHOUT SCIATICA: ICD-10-CM

## 2018-04-24 DIAGNOSIS — G43.009 MIGRAINE WITHOUT AURA AND WITHOUT STATUS MIGRAINOSUS, NOT INTRACTABLE: ICD-10-CM

## 2018-04-24 DIAGNOSIS — J30.1 CHRONIC SEASONAL ALLERGIC RHINITIS DUE TO POLLEN: ICD-10-CM

## 2018-04-24 DIAGNOSIS — M51.36 DDD (DEGENERATIVE DISC DISEASE), LUMBAR: ICD-10-CM

## 2018-04-24 DIAGNOSIS — F10.21 ALCOHOL DEPENDENCE IN REMISSION (HCC): ICD-10-CM

## 2018-04-24 PROBLEM — R05.9 COUGH: Status: RESOLVED | Noted: 2017-11-27 | Resolved: 2018-04-24

## 2018-04-24 PROCEDURE — G0438 PPPS, INITIAL VISIT: HCPCS | Performed by: NURSE PRACTITIONER

## 2018-04-24 RX ORDER — ZOLPIDEM TARTRATE 5 MG/1
5 TABLET ORAL NIGHTLY PRN
COMMUNITY
End: 2019-07-02

## 2018-04-24 RX ORDER — BUTALBITAL, ACETAMINOPHEN AND CAFFEINE 50; 325; 40 MG/1; MG/1; MG/1
1 TABLET ORAL EVERY 4 HOURS PRN
Qty: 30 TAB | Refills: 0 | Status: SHIPPED | OUTPATIENT
Start: 2018-04-24 | End: 2018-05-24 | Stop reason: SDUPTHER

## 2018-04-24 ASSESSMENT — PATIENT HEALTH QUESTIONNAIRE - PHQ9: CLINICAL INTERPRETATION OF PHQ2 SCORE: 0

## 2018-04-24 ASSESSMENT — ACTIVITIES OF DAILY LIVING (ADL): BATHING_REQUIRES_ASSISTANCE: 0

## 2018-04-24 NOTE — ASSESSMENT & PLAN NOTE
Chronic in nature. Worsening. Patient states that acid reflux is well controlled with medication at this time. Denies continued cough, burning in his epigastric area.

## 2018-04-24 NOTE — ASSESSMENT & PLAN NOTE
Chronic in nature. Stable. Patient continues to follow up with gastroenterology. Patient denies blood in his stool, nausea vomiting and diarrhea

## 2018-04-24 NOTE — ASSESSMENT & PLAN NOTE
Chronic in nature. Stable. Managed with over-the-counter medications as needed. Denies current symptoms.

## 2018-04-24 NOTE — ASSESSMENT & PLAN NOTE
Chronic in nature. Stable. Patient continues to take Ambien 5 mg as needed. She does not drink or use other sedating medication or drive while taking this medication. Patient states that he does have good sleep hygiene.

## 2018-04-24 NOTE — ASSESSMENT & PLAN NOTE
Chronic in nature. States he has noticed a recent increase in symptoms, related to increased stress which is his trigger. States he did have migraine in the past states that they have been better for multiple years as his stress have been lower. States he notices these about 1x/month. States it is triggered by stress. States that he is uses peppermint to alleviate these migraines. States he tried aleve which doesn't work. States that pain is throbbing behind his eyes.

## 2018-04-24 NOTE — ASSESSMENT & PLAN NOTE
Chronic in nature. Stable. Patient continues to follow with pain management, states he had injections which improved symptoms.

## 2018-04-24 NOTE — PROGRESS NOTES
Chief Complaint   Patient presents with   • Annual Wellness Visit     THIS VISIT WAS CONDUCTED AND COMPLETED BY KAILA CHU, PATIENT WAS ASSESSED AND HPI COLLECTED by PROVIDER. INCORRECT NOTE AUTHOR IS SHOWN RELATED TO TECHNICAL ISSUES.    HPI:  Vickey is a 68 y.o. here for Medicare Annual Wellness Visit    Migraine without aura and without status migrainosus, not intractable  Chronic in nature. States he has noticed a recent increase in symptoms, related to increased stress which is his trigger. States he did have migraine in the past states that they have been better for multiple years as his stress have been lower. States he notices these about 1x/month. States it is triggered by stress. States that he is uses peppermint to alleviate these migraines. States he tried aleve which doesn't work. States that pain is throbbing behind his eyes.     Alcohol dependence  Chronic in nature. Stable. History of alcoholism. Patient has not had a drink in 4 years.    Alcoholic liver disease, unspecified  Chronic in nature. Stable. Patient continues to follow with Dr. Dinero of gastroenterology.    Chronic back pain  Chronic in nature. Stable. Patient continues to follow with pain management, states he had injections which improved symptoms.     DDD (degenerative disc disease), lumbar  Chronic in nature. She able. Follow up with pain management.    Diverticulitis  Chronic in nature. Stable. Patient continues to follow up with gastroenterology. Patient denies blood in his stool, nausea vomiting and diarrhea    Gastroesophageal reflux disease with esophagitis  Chronic in nature. Worsening. Patient states that acid reflux is well controlled with medication at this time. Denies continued cough, burning in his epigastric area.    Insomnia  Chronic in nature. Stable. Patient continues to take Ambien 5 mg as needed. She does not drink or use other sedating medication or drive while taking this medication. Patient  states that he does have good sleep hygiene.    Lumbar radiculopathy  Chronic in nature. Stable. Follows with pain management as needed.    Marijuana use  He continues to use marijuana as needed for pain.    Seasonal allergies  Chronic in nature. Stable. Managed with over-the-counter medications as needed. Denies current symptoms.    Urinary frequency  Chronic in nature. Managed by urology.          Patient Active Problem List    Diagnosis Date Noted   • Cough 11/27/2017   • Urinary frequency 06/29/2017   • Alcoholic liver disease, unspecified 06/29/2017   • Controlled substance agreement signed 04/27/2016   • Preventative health care 03/14/2016   • Gastroesophageal reflux disease with esophagitis 08/05/2015   • Lumbar radiculopathy 05/15/2015   • DDD (degenerative disc disease), lumbar 05/15/2015   • Chronic back pain 05/15/2015   • Diverticulitis 11/19/2014   • Insomnia 11/19/2014   • Alcohol dependence (CMS-[facility]) 11/19/2014   • Marijuana use 11/18/2014   • Seasonal allergies 10/29/2014       Current Outpatient Prescriptions   Medication Sig Dispense Refill   • zolpidem (AMBIEN) 5 MG Tab Take 5 mg by mouth at bedtime as needed for Sleep.     • fluticasone (FLONASE) 50 MCG/ACT nasal spray Spray 1 Spray in nose every day. 16 g 0   • ranitidine (ZANTAC) 150 MG Tab Take 1 Tab by mouth 2 times a day. Indications: Gastroesophageal Reflux Disease 180 Tab 3   • BABY ASPIRIN PO Take  by mouth.     • vitamin D (CHOLECALCIFEROL) 1000 UNIT Tab Take 1,000 Units by mouth every day.     • Pseudoephedrine-Guaifenesin (MUCINEX D PO) Take  by mouth.     • Pseudoephedrine-APAP-DM (DAYQUIL PO) Take  by mouth.     • Pseudoeph-Doxylamine-DM-APAP (NYQUIL PO) Take  by mouth.     • guaifenesin-codeine (CHERATUSSIN AC) Solution oral solution Take 5 mL by mouth every four hours as needed for Cough. 90 mL 0   • azithromycin (ZITHROMAX) 250 MG Tab Take as directed 6 Tab 0   • guaifenesin-codeine (TUSSI-ORGANIDIN NR) 100-10 MG/5ML syrup Take 5  mL by mouth 3 times a day as needed for Cough. 120 mL 0     No current facility-administered medications for this visit.         Patient is taking medications as noted in medication list.  Current supplements as per medication list.     Allergies: Pantoprazole and Pantoprazole    Current social contact/activities: golf,sking surfing   Patient's perception of their health: excellent    Is patient current with immunizations? No, due for ZOSTAVAX (Shingles). Patient is interested in receiving ZOSTAVAX (Shingles) today.    He  reports that he has never smoked. He has never used smokeless tobacco. He reports that he uses drugs, including Marijuana. He reports that he does not drink alcohol.  Counseling given: Not Answered        DPA/Advanced directive: Patient does not have an Advanced Directive.  A packet and workshop information was given on Advanced Directives.    ROS:    Gait: Uses no assistive device   Ostomy: No   Other tubes: No   Amputations: No   Chronic oxygen use: No   Last eye exam: 02/18   Wears hearing aids: No   : Denies any urinary leakage during the last 6 months      Screening:    Little interest or pleasure in doing things?  0 - not at all  Feeling down, depressed, or hopeless? 0 - not at all  Patient Health Questionnaire Score: 0    If depressive symptoms identified deferred to follow up visit unless specifically addressed in assessment and plan.    Interpretation of PHQ-9 Total Score   Score Severity   1-4 No Depression   5-9 Mild Depression   10-14 Moderate Depression   15-19 Moderately Severe Depression   20-27 Severe Depression    Screening for Cognitive Impairment  Three Minute Recall (apple, watch, shira)  3/3 Banana, [place], [place]  Draw clock face with all 12 numbers set to the hand to show 10 minutes past 11 o'clock  1 Time 10:10  5/5  If cognitive concerns identified, deferred for follow up unless specifically addressed in assessment and plan.    Fall Risk Assessment  Has the patient had  two or more falls in the last year or any fall with injury in the last year?  No  If fall risk identified, deferred for follow up unless specifically addressed in assessment and plan.    Safety Assessment  Throw rugs on floor.  Yes  Handrails on all stairs.  No  Good lighting in all hallways.  Yes  Difficulty hearing.  No  Patient counseled about all safety risks that were identified.    Functional Assessment ADLs  Are there any barriers preventing you from cooking for yourself or meeting nutritional needs?  No.    Are there any barriers preventing you from driving safely or obtaining transportation?  No.    Are there any barriers preventing you from using a telephone or calling for help?  No.    Are there any barriers preventing you from shopping?  No.    Are there any barriers preventing you from taking care of your own finances?  No.    Are there any barriers preventing you from managing your medications?  No.    Are there any barriers preventing you from showering, bathing or dressing yourself?  No.    Are you currently engaging any exercise or physical activity?  Yes.  Golf ski surfing     Health Maintenance Summary                Annual Wellness Visit Overdue 1950     IMM PNEUMOCOCCAL 65+ (ADULT) LOW/MEDIUM RISK SERIES Next Due 10/6/2018      Done 10/6/2017 Imm Admin: Pneumococcal Conjugate Vaccine (Prevnar/PCV-13)    IMM DTaP/Tdap/Td Vaccine Next Due 2/16/2021      Done 2/16/2011 Imm Admin: Tdap Vaccine    COLONOSCOPY Next Due 11/17/2024      Done 11/17/2014 Surg:COLONOSCOPY - ENDO     Patient has more history with this topic...          Patient Care Team:  ANCELMO Rawls as PCP - General (Family Medicine)  Alexis Hernandez M.D. as Consulting Physician (Pain Management)  Bharath Mathias M.D. (Family Medicine)  LINDA Rodarte as Consulting Physician (Urology)  Gurmeet Dinero Jr., M.D. as Consulting Physician (Gastroenterology)    Social History   Substance Use Topics   •  "Smoking status: Never Smoker   • Smokeless tobacco: Never Used      Comment: +second-hand exposure   • Alcohol use No      Comment: quit 3 years      Family History   Problem Relation Age of Onset   • Dementia Mother    • Heart Disease Father      He  has a past medical history of Chronic back pain; Diverticulitis; and GERD (gastroesophageal reflux disease).   Past Surgical History:   Procedure Laterality Date   • COLONOSCOPY - ENDO  11/17/2014    Performed by Moris Stanton D.O. at [facility]         Exam:   Blood pressure 105/70, pulse 74, temperature 36.8 °C (98.2 °F), resp. rate 16, height 1.753 m (5' 9\"), weight 67.7 kg (149 lb 4 oz), SpO2 95 %. Body mass index is 22.04 kg/m².    Hearing good.    Dentition good  Alert, oriented in no acute distress.  Eye contact is good, speech goal directed, affect calm      Assessment and Plan. The following treatment and monitoring plan is recommended:      Patient will continue follow-up with gastroenterology, urology. Continue to treat allergies with over-the-counter medications, continue Ambien for sleep, patient will obtain shingrix at the pharmacy. Patient states he is overall feeling well at this time as such follow-up will be one year or sooner as needed.    Services suggested: No services needed at this time  Health Care Screening: Age-appropriate preventive services recommended by USPTF and ACIP covered by Medicare were discussed today. Services ordered if indicated and agreed upon by the patient.  Referrals offered: PT/OT/Nutrition counseling/Behavioral Health/Smoking cessation as per orders if indicated.    Discussion today about general wellness and lifestyle habits:    · Prevent falls and reduce trip hazards; Cautioned about securing or removing rugs.  · Have a working fire alarm and carbon monoxide detector;   · Engage in regular physical activity and social activities     Follow-up: 1 year  "

## 2018-05-24 ENCOUNTER — TELEPHONE (OUTPATIENT)
Dept: MEDICAL GROUP | Facility: PHYSICIAN GROUP | Age: 68
End: 2018-05-24

## 2018-05-24 DIAGNOSIS — G43.009 MIGRAINE WITHOUT AURA AND WITHOUT STATUS MIGRAINOSUS, NOT INTRACTABLE: ICD-10-CM

## 2018-05-24 RX ORDER — BUTALBITAL, ACETAMINOPHEN AND CAFFEINE 50; 325; 40 MG/1; MG/1; MG/1
1 TABLET ORAL EVERY 4 HOURS PRN
Qty: 30 TAB | Refills: 0 | Status: SHIPPED | OUTPATIENT
Start: 2018-05-24 | End: 2018-09-23 | Stop reason: SDUPTHER

## 2018-05-24 NOTE — TELEPHONE ENCOUNTER
Pt came in today to request a refill of his medicationbutal/acetamin/cf   Please call pt if this cannot be filled

## 2018-05-24 NOTE — TELEPHONE ENCOUNTER
Rx faxed to [facility] pharmacy, confirmation received, scanned to media.      Phone Number Called: [phone]      Message: LVM to call back.  - Per Ryan, if patient is taking #30/30 days he will require an appointment to discuss.     Left Message for patient to call back: yes

## 2018-05-24 NOTE — TELEPHONE ENCOUNTER
Was the patient seen in the last year in this department? Yes     Does patient have an active prescription for medications requested? No     Received Request Via: Patient       Pt came in today to request a refill of his medicationbutal/acetamin/cf   Please call pt if this cannot be filled

## 2018-06-20 ENCOUNTER — OFFICE VISIT (OUTPATIENT)
Dept: MEDICAL GROUP | Facility: PHYSICIAN GROUP | Age: 68
End: 2018-06-20
Payer: MEDICARE

## 2018-06-20 ENCOUNTER — TELEPHONE (OUTPATIENT)
Dept: MEDICAL GROUP | Facility: PHYSICIAN GROUP | Age: 68
End: 2018-06-20

## 2018-06-20 ENCOUNTER — HOSPITAL ENCOUNTER (OUTPATIENT)
Dept: RADIOLOGY | Facility: MEDICAL CENTER | Age: 68
End: 2018-06-20
Attending: FAMILY MEDICINE
Payer: MEDICARE

## 2018-06-20 VITALS
OXYGEN SATURATION: 96 % | HEIGHT: 69 IN | BODY MASS INDEX: 22.24 KG/M2 | WEIGHT: 150.13 LBS | DIASTOLIC BLOOD PRESSURE: 60 MMHG | SYSTOLIC BLOOD PRESSURE: 100 MMHG | TEMPERATURE: 97.5 F | HEART RATE: 82 BPM

## 2018-06-20 DIAGNOSIS — M25.551 RIGHT HIP PAIN: ICD-10-CM

## 2018-06-20 DIAGNOSIS — M16.11 PRIMARY OSTEOARTHRITIS OF RIGHT HIP: ICD-10-CM

## 2018-06-20 PROCEDURE — 73522 X-RAY EXAM HIPS BI 3-4 VIEWS: CPT

## 2018-06-20 PROCEDURE — 99214 OFFICE O/P EST MOD 30 MIN: CPT | Performed by: FAMILY MEDICINE

## 2018-06-20 RX ORDER — MELOXICAM 15 MG/1
15 TABLET ORAL DAILY
Qty: 30 TAB | Refills: 1 | Status: SHIPPED | OUTPATIENT
Start: 2018-06-20 | End: 2018-07-24

## 2018-06-20 NOTE — TELEPHONE ENCOUNTER
Phone Number Called: [phone] (home) [phone] (work)     Message: Unable to reach pt left vm to call back.     Left Message for patient to call back: yes

## 2018-06-20 NOTE — PROGRESS NOTES
"Subjective:      iVckey Turner is a 68 y.o. male who presents with Pain (right hip)            HPI     This is a 68-year-old white male patient of KAILA Gonsalez who is here complaining of right hip pain. He said he started having the pain in October 2017 which has gotten worse in the last few months. He said he is very active and he hikes, skis and golfs. He said he was still able to ski this last winter season. He denies any trauma. The pain is noted on the lateral aspect of the hip and shoots down to the groin and now recently to the back which gives him a headache. He said he cannot sleep well at night because of the pain. He said he has to take his Ambien to help him sleep. Pain is also noted with sitting but worse when he is on his feet walking he feels that the bones are rubbing together that there is no more cushion in between. He has tried ibuprofen initially 200 mg and he has increased to 1000 mg without help. He said he is careful taking ibuprofen because he has GERD. He has tried acetaminophen but this made his heart rate go up. He said he started taking turmeric with cucumin since 2 days ago and he has not felt any improvement yet.    He was a heavy alcohol drinker for 34 years and he quit 4 years ago.    He never smoked cigarettes.    Past medical history, past surgical history, family history reviewed-no changes    Social history reviewed-no changes    Allergies reviewed-no changes    Medications reviewed-no changes    ROS     As per history of present illness, the rest are negative.       Objective:     /60   Pulse 82   Temp 36.4 °C (97.5 °F)   Ht 1.753 m (5' 9\")   Wt 68.1 kg (150 lb 2.1 oz)   SpO2 96%   BMI 22.17 kg/m²      Physical Exam     Examined alert, awake, oriented, not in distress    Neck-supple, no lymphadenopathy, no thyromegaly  Lungs-clear to auscultation, no rales, no wheezes  Heart-regular rate and rhythm, no murmur  Extremities-no edema, clubbing, " cyanosis, right hip exam-no deformity, no skin changes, no tenderness trochanteric bursa, strong femoral pulse, no masses or bulge right groin, mild limitation on flexion and extension and internal/external rotation because of pain, strong right pedal pulse            Assessment/Plan:     1. Right hip pain  I suspect this is due to arthritis. We will get x-ray of the hips with pelvis. Meloxicam 15 mg start with half a tablet a day with food and go up to a full tablet if there is no good results. Further recommendation will depend on results.  - DX-HIP-BILATERAL-WITH PELVIS-3/4 VIEWS; Future  - meloxicam (MOBIC) 15 MG tablet; Take 1 Tab by mouth every day.  Dispense: 30 Tab; Refill: 1      Please note that this dictation was created using voice recognition software. I have worked with consultants from the vendor as well as technical experts from [facility] to optimize the interface. I have made every reasonable attempt to correct obvious errors, but I expect that there are errors of grammar and possibly content I did not discover before finalizing the note.

## 2018-06-28 ENCOUNTER — OFFICE VISIT (OUTPATIENT)
Dept: MEDICAL GROUP | Facility: PHYSICIAN GROUP | Age: 68
End: 2018-06-28
Payer: MEDICARE

## 2018-06-28 VITALS
SYSTOLIC BLOOD PRESSURE: 126 MMHG | RESPIRATION RATE: 16 BRPM | HEIGHT: 69 IN | DIASTOLIC BLOOD PRESSURE: 88 MMHG | HEART RATE: 87 BPM | OXYGEN SATURATION: 98 % | BODY MASS INDEX: 22.19 KG/M2 | TEMPERATURE: 98.4 F | WEIGHT: 149.8 LBS

## 2018-06-28 DIAGNOSIS — G89.29 CHRONIC RIGHT HIP PAIN: ICD-10-CM

## 2018-06-28 DIAGNOSIS — F12.90 MARIJUANA USE: ICD-10-CM

## 2018-06-28 DIAGNOSIS — M16.11 PRIMARY OSTEOARTHRITIS OF RIGHT HIP: ICD-10-CM

## 2018-06-28 DIAGNOSIS — M25.551 CHRONIC RIGHT HIP PAIN: ICD-10-CM

## 2018-06-28 DIAGNOSIS — K21.00 GASTROESOPHAGEAL REFLUX DISEASE WITH ESOPHAGITIS: ICD-10-CM

## 2018-06-28 PROCEDURE — 99214 OFFICE O/P EST MOD 30 MIN: CPT | Performed by: PHYSICIAN ASSISTANT

## 2018-06-28 ASSESSMENT — PAIN SCALES - GENERAL: PAINLEVEL: NO PAIN

## 2018-06-28 NOTE — ASSESSMENT & PLAN NOTE
Chronic condition. Patient states he developed intermittent right lateral hip pain in 09/17. States for the past 3 weeks pain symptoms have been constant. States pain is a constant aching pain that can develop into a sharp radiating pain. States pain radiates down to groin, anterior portion of upper lower extremity and to his lower back. Denies tenderness to palpation. States he is experiencing cicatrization due to pain symptoms. States he is also experiencing an abnormal gait due to pain. Ambulation and aggravate symptoms. Rest helps alleviate symptoms. States he is very active and would like to continue being active. States he coughs, surfs and skis regularly.      X-rays of right hip were performed on 6/20/18.  Results are as follows:    1.  Moderate-severe bilateral hip joint osteoarthritis, right greater than left    2.  Bilateral sacroiliac joint osteoarthritis    3.  Facet arthropathy and endplate spurring of the lumbar spine    4.  Dextroconvex lumbar spine scoliosis      He tells me that he is following up with orthopedics on 9/20/18 for further evaluation symptoms. Patient was recently prescribed Mobic 15 mg Once daily. States he is experiencing side effects from medication. He tells me that he discontinue medication due to experiencing bloating, increased flatulence, loose stools and abdominal cramping.  Patient is inquiring about other treatment options.

## 2018-06-28 NOTE — PROGRESS NOTES
"Chief Complaint   Patient presents with   • Hip Pain     x 2 weeks, not due to injury, \"old age\"   • Medication Reaction     diarrhea with meloxicam       HISTORY OF PRESENT ILLNESS: Vickey Turner is an established 68 y.o. male here to discuss the evaluation and management of:    Chronic right hip pain  Chronic condition. Patient states he developed intermittent right lateral hip pain in 09/17. States for the past 3 weeks pain symptoms have been constant. States pain is a constant aching pain that can develop into a sharp radiating pain. States pain radiates down to groin, anterior portion of upper lower extremity and to his lower back. Denies tenderness to palpation. States he is experiencing cicatrization due to pain symptoms. States he is also experiencing an abnormal gait due to pain. Ambulation and aggravate symptoms. Rest helps alleviate symptoms. States he is very active and would like to continue being active. States he coughs, surfs and skis regularly.      X-rays of right hip were performed on 6/20/18.  Results are as follows:    1.  Moderate-severe bilateral hip joint osteoarthritis, right greater than left    2.  Bilateral sacroiliac joint osteoarthritis    3.  Facet arthropathy and endplate spurring of the lumbar spine    4.  Dextroconvex lumbar spine scoliosis      He tells me that he is following up with orthopedics on 9/20/18 for further evaluation symptoms. Patient was recently prescribed Mobic 15 mg Once daily. States he is experiencing side effects from medication. He tells me that he discontinue medication due to experiencing bloating, increased flatulence, loose stools and abdominal cramping.  Patient is inquiring about other treatment options.    Gastroesophageal reflux disease with esophagitis  Chronic stable condition. Patient states he is currently taking Zantac 150 milligrams Twice a day. Compliant with medication and experiences no side effects or complications from medication. " States symptoms are currently managed by medication. Denies hoarseness of voice, difficulty swallowing, cough or burning in the epigastric area.    Marijuana use  He tells me that he occasionally uses marijuana for pain symptoms. States he may smoke 1 joint per month for pain. Discussed with patient that if pain medication is prescribed until he can follow up with orthopedics for further evaluation of chronic right hip pain that he will have to discontinue marijuana use. Patient verbally agreed that he could discontinue use. Denies alcohol abuse. Denies other illicit drug use.      Patient Active Problem List    Diagnosis Date Noted   • Chronic right hip pain 06/28/2018   • Migraine without aura and without status migrainosus, not intractable 04/24/2018   • Urinary frequency 06/29/2017   • Alcoholic liver disease, unspecified 06/29/2017   • Gastroesophageal reflux disease with esophagitis 08/05/2015   • Lumbar radiculopathy 05/15/2015   • DDD (degenerative disc disease), lumbar 05/15/2015   • Chronic back pain 05/15/2015   • Diverticulitis 11/19/2014   • Insomnia 11/19/2014   • Alcohol dependence (HCC) 11/19/2014   • Marijuana use 11/18/2014   • Seasonal allergies 10/29/2014       Allergies:Pantoprazole and Pantoprazole    Current Outpatient Prescriptions   Medication Sig Dispense Refill   • meloxicam (MOBIC) 15 MG tablet Take 1 Tab by mouth every day. 30 Tab 1   • acetaminophen/caffeine/butalbital 325-40-50 mg (FIORICET) -40 MG Tab Take 1 Tab by mouth every four hours as needed for Headache. 30 Tab 0   • zolpidem (AMBIEN) 5 MG Tab Take 5 mg by mouth at bedtime as needed for Sleep.     • fluticasone (FLONASE) 50 MCG/ACT nasal spray Spray 1 Spray in nose every day. 16 g 0   • ranitidine (ZANTAC) 150 MG Tab Take 1 Tab by mouth 2 times a day. Indications: Gastroesophageal Reflux Disease 180 Tab 3   • vitamin D (CHOLECALCIFEROL) 1000 UNIT Tab Take 1,000 Units by mouth every day.       No current  "facility-administered medications for this visit.        Social History   Substance Use Topics   • Smoking status: Never Smoker   • Smokeless tobacco: Never Used      Comment: +second-hand exposure   • Alcohol use No      Comment: quit 4  years        Family Status   Relation Status   • Mother    • Father    • Brother Alive   • Maternal Grandmother    • Maternal Grandfather    • Paternal Grandmother    • Paternal Grandfather      Family History   Problem Relation Age of Onset   • Dementia Mother    • Heart Disease Father        ROS:  Review of Systems   Constitutional: Negative for fever, chills, weight loss and malaise/fatigue.   HENT: Negative for ear pain, nosebleeds, congestion, sore throat and neck pain.    Eyes: Negative for blurred vision.   Respiratory: Negative for cough, sputum production, shortness of breath and wheezing.    Cardiovascular: Negative for chest pain, palpitations, orthopnea and leg swelling.   Gastrointestinal: Negative for heartburn, nausea, vomiting and abdominal pain.   Genitourinary: Negative for dysuria, urgency and frequency.   Musculoskeletal: Negative for myalgias, back pain and positive for right hip joint pain.   Skin: Negative for rash and itching.   Neurological: Negative for dizziness, tingling, tremors, sensory change, focal weakness and headaches.   Endo/Heme/Allergies: Does not bruise/bleed easily.   Psychiatric/Behavioral: Negative for depression, suicidal ideas and memory loss.  The patient is not nervous/anxious and does not have insomnia.    All other systems reviewed and are negative except as in HPI.    Exam: Blood pressure 126/88, pulse 87, temperature 36.9 °C (98.4 °F), resp. rate 16, height 1.753 m (5' 9\"), weight 67.9 kg (149 lb 12.8 oz), SpO2 98 %. Body mass index is 22.12 kg/m².  General: Normal appearing. No distress.  HEENT: Normocephalic. Eyes conjunctiva clear lids without ptosis, pupils equal and reactive to " light accommodation, ears normal shape and contour, canals are clear bilaterally, tympanic membranes are benign, nasal mucosa benign, oropharynx is without erythema, edema or exudates.   Neck: Supple without JVD or bruit. Thyroid is not enlarged.  Pulmonary: Clear to ausculation.  Normal effort. No rales, ronchi, or wheezing.  Cardiovascular: Regular rate and rhythm without murmur. Carotid pulses are intact and equal bilaterally.  Abdomen: Soft, nontender, nondistended. Normal bowel sounds. Liver and spleen are not palpable  Neurologic: Grossly nonfocal.  Cranial nerves are normal. LE DTR's normal and symmetric.  Lymph: No cervical, supraclavicular or axillary lymph nodes are palpable  Skin: Warm and dry.  No rashes or suspicious skin lesions.  Musculoskeletal: No extremity cyanosis, clubbing, or edema. Positive right leg raise. Positive for pain with internal and external hip rotation. Abnormal gait.  Psych: Normal mood and affect. Alert and oriented x3. Judgment and insight is normal.    Medical decision-making and discussion:  1. Chronic right hip pain  2. Primary osteoarthritis of right hip    X-rays of right hip were performed on 6/20/18.  Results are as follows:    1.  Moderate-severe bilateral hip joint osteoarthritis, right greater than left    2.  Bilateral sacroiliac joint osteoarthritis    3.  Facet arthropathy and endplate spurring of the lumbar spine    4.  Dextroconvex lumbar spine scoliosis    Advised patient to discontinue Mobic due to side effects. We discarded medication properly during today's appointment. Advised patient to use over-the-counter Tylenol to help alleviate symptoms. Discussed low-dose tramadol 50 mg twice a day for pain symptoms until he can follow up with orthopedics for further evaluation symptoms. Patient states he would like to proceed with treating symptoms with Tylenol and if symptoms do not improve that he will make a follow-up appointment to discuss tramadol as a treatment  option until he can follow up with orthopedics. Discussed with patient if he decides to proceed with tramadol a urine drug screen will be obtained at that time. Patient verbally consented that he will discontinue marijuana use is prescribed controlled substances for pain symptoms.    Advised patient to rest, stretch, use heating pad as needed and stay as active as possible.    3. Gastroesophageal reflux disease with esophagitis  This is a chronic and stable problem. Patient is doing well. No red flags. Continue PPI and monitor.      4. Marijuana use  Same as 3.       Please note that this dictation was created using voice recognition software. I have made every reasonable attempt to correct obvious errors, but I expect that there are errors of grammar and possibly content that I did not discover before finalizing the note.        Return if symptoms worsen or fail to improve.

## 2018-06-28 NOTE — ASSESSMENT & PLAN NOTE
Chronic stable condition. Patient states he is currently taking Zantac 150 milligrams Twice a day. Compliant with medication and experiences no side effects or complications from medication. States symptoms are currently managed by medication. Denies hoarseness of voice, difficulty swallowing, cough or burning in the epigastric area.

## 2018-06-28 NOTE — ASSESSMENT & PLAN NOTE
He tells me that he occasionally uses marijuana for pain symptoms. States he may smoke 1 joint per month for pain. Discussed with patient that if pain medication is prescribed until he can follow up with orthopedics for further evaluation of chronic right hip pain that he will have to discontinue marijuana use. Patient verbally agreed that he could discontinue use. Denies alcohol abuse. Denies other illicit drug use.

## 2018-07-03 ENCOUNTER — OFFICE VISIT (OUTPATIENT)
Dept: MEDICAL GROUP | Facility: PHYSICIAN GROUP | Age: 68
End: 2018-07-03
Payer: MEDICARE

## 2018-07-03 VITALS
HEART RATE: 91 BPM | WEIGHT: 149 LBS | BODY MASS INDEX: 22.07 KG/M2 | RESPIRATION RATE: 16 BRPM | SYSTOLIC BLOOD PRESSURE: 132 MMHG | DIASTOLIC BLOOD PRESSURE: 74 MMHG | OXYGEN SATURATION: 95 % | HEIGHT: 69 IN | TEMPERATURE: 98.6 F

## 2018-07-03 DIAGNOSIS — M25.551 ACUTE RIGHT HIP PAIN: ICD-10-CM

## 2018-07-03 PROCEDURE — 99214 OFFICE O/P EST MOD 30 MIN: CPT | Performed by: NURSE PRACTITIONER

## 2018-07-03 RX ORDER — HYDROCODONE BITARTRATE AND ACETAMINOPHEN 5; 325 MG/1; MG/1
1-2 TABLET ORAL EVERY 4 HOURS PRN
Qty: 42 TAB | Refills: 0 | Status: SHIPPED | OUTPATIENT
Start: 2018-07-03 | End: 2018-07-17 | Stop reason: SDUPTHER

## 2018-07-03 ASSESSMENT — PAIN SCALES - GENERAL: PAINLEVEL: 10=SEVERE PAIN

## 2018-07-03 NOTE — LETTER
July 3, 2018        Vickey Dalal Johnny  Current Outpatient Prescriptions   Medication Sig Dispense Refill   • HYDROcodone-acetaminophen (NORCO) 5-325 MG Tab per tablet Take 1-2 Tabs by mouth every four hours as needed for up to 7 days. 42 Tab 0   • meloxicam (MOBIC) 15 MG tablet Take 1 Tab by mouth every day. 30 Tab 1   • acetaminophen/caffeine/butalbital 325-40-50 mg (FIORICET) -40 MG Tab Take 1 Tab by mouth every four hours as needed for Headache. 30 Tab 0   • zolpidem (AMBIEN) 5 MG Tab Take 5 mg by mouth at bedtime as needed for Sleep.     • fluticasone (FLONASE) 50 MCG/ACT nasal spray Spray 1 Spray in nose every day. 16 g 0   • ranitidine (ZANTAC) 150 MG Tab Take 1 Tab by mouth 2 times a day. Indications: Gastroesophageal Reflux Disease 180 Tab 3   • vitamin D (CHOLECALCIFEROL) 1000 UNIT Tab Take 1,000 Units by mouth every day.       No current facility-administered medications for this visit.

## 2018-07-03 NOTE — PROGRESS NOTES
Chief Complaint   Patient presents with   • Hip Pain     x 2 weeks        HISTORY OF PRESENT ILLNESS: Patient is a 68 y.o. male established patient who presents today to discuss hip pain.    Chronic right hip pain  Chronic in nature. Worsening. States he is having severe 10/10 pain at this time states that he has an appointment with Dr. De Paz on 7/23. States that he can't walk and that pain is keeping him awake at night. States that pain is worsening. States that with walking he will start noticing pain in shooting down the leg, into the groin. Is noticing muscle spasm in his back as well. Patient has severe osteoathritis. Has tried ibuprofen without relief, meloxicam caused diarrhea, tylenol did not improve the pain. States he believes he will need a hip replacement. Continues to be active despite severe pain. States he has been icing, is noticing some swelling. Has been using heat as well. States he has tried a peppermint cream which helps very minimally.     Acute pain   This is a new problem.   Patient is complaining of pain x 2 week(s) located in his right hip.  Pain is constant, described as sharp, aching and a 10/10 on the pain scale.   Treatments tried include:Tylenol, NSAIDs, otc ointments peppermint, heat, ice    I have assessed the patient’s risk for abuse, dependency, and addiction using the validated Opioid Risk Tool.    Opioid Risk Score: No value filed.    Interpretation of Opioid Risk Score   Score 0-3 = Low risk of abuse. Do UDS at least once per year.  Score 4-7 = Moderate risk of abuse. Do UDS 1-4 times per year.  Score 8+ = High risk of abuse. Refer to specialist.    I have conducted a physical exam and documented findings.  I certify that I have obtained and reviewed his medical history. I have also made a good guerrero effort to obtain applicable records from other providers who have treated the patient.  I have reviewed the patient's prescription history as maintained by the [place] Prescription  Monitoring Program.     Given the above, I believe the benefits of controlled substance therapy outweigh the risks. The reasons for prescribing controlled substances include non-narcotic, oral analgesic alternatives have been inadequate for pain control. Accordingly, I have discussed the risk and benefits, treatment plan, and alternative therapies with the patient. Patient was advised that this medicine is intended for short term (no more than 14 days)/intermittent use only and not intended to be an ongoing prescription.        Patient Active Problem List    Diagnosis Date Noted   • Chronic right hip pain 06/28/2018   • Migraine without aura and without status migrainosus, not intractable 04/24/2018   • Urinary frequency 06/29/2017   • Alcoholic liver disease, unspecified 06/29/2017   • Gastroesophageal reflux disease with esophagitis 08/05/2015   • Lumbar radiculopathy 05/15/2015   • DDD (degenerative disc disease), lumbar 05/15/2015   • Chronic back pain 05/15/2015   • Diverticulitis 11/19/2014   • Insomnia 11/19/2014   • Alcohol dependence (HCC) 11/19/2014   • Marijuana use 11/18/2014   • Seasonal allergies 10/29/2014       Allergies:Pantoprazole and Pantoprazole    Current Outpatient Prescriptions   Medication Sig Dispense Refill   • HYDROcodone-acetaminophen (NORCO) 5-325 MG Tab per tablet Take 1-2 Tabs by mouth every four hours as needed for up to 7 days. 42 Tab 0   • meloxicam (MOBIC) 15 MG tablet Take 1 Tab by mouth every day. 30 Tab 1   • acetaminophen/caffeine/butalbital 325-40-50 mg (FIORICET) -40 MG Tab Take 1 Tab by mouth every four hours as needed for Headache. 30 Tab 0   • zolpidem (AMBIEN) 5 MG Tab Take 5 mg by mouth at bedtime as needed for Sleep.     • fluticasone (FLONASE) 50 MCG/ACT nasal spray Spray 1 Spray in nose every day. 16 g 0   • ranitidine (ZANTAC) 150 MG Tab Take 1 Tab by mouth 2 times a day. Indications: Gastroesophageal Reflux Disease 180 Tab 3   • vitamin D (CHOLECALCIFEROL)  "1000 UNIT Tab Take 1,000 Units by mouth every day.       No current facility-administered medications for this visit.        Social History   Substance Use Topics   • Smoking status: Never Smoker   • Smokeless tobacco: Never Used      Comment: +second-hand exposure   • Alcohol use No      Comment: quit 4  years        Family Status   Relation Status   • Mother    • Father    • Brother Alive   • Maternal Grandmother    • Maternal Grandfather    • Paternal Grandmother    • Paternal Grandfather      Family History   Problem Relation Age of Onset   • Dementia Mother    • Heart Disease Father        Review of Systems:   Constitutional:  Negative for fever, chills, weight loss and malaise/fatigue.    Respiratory:  Negative for cough, sputum production, shortness of breath and wheezing.    Cardiovascular:  Negative for chest pain, palpitations, orthopnea and leg swelling.   Gastrointestinal:  Negative for heartburn, nausea, vomiting and abdominal pain.   Genitourinary:  Negative for dysuria, urgency and frequency.   Musculoskeletal:  Negative for myalgias, back pain and positive joint pain.   Skin:  Negative for rash and itching.   All other systems reviewed and are negative except as in HPI.    Exam:  Blood pressure 132/74, pulse 91, temperature 37 °C (98.6 °F), resp. rate 16, height 1.753 m (5' 9\"), weight 67.6 kg (149 lb), SpO2 95 %.  General:  Normal appearing. No distress.  Pulmonary:  Clear to ausculation.  Normal effort. No rales, ronchi, or wheezing.  Cardiovascular:  Regular rate and rhythm without murmur. Carotid and radial pulses are intact and equal bilaterally.  Neurologic:  Grossly nonfocal  Lymph:  No cervical, supraclavicular or axillary lymph nodes are palpable  Skin:  Warm and dry.  No obvious lesions.  Musculoskeletal:  Normal gait. No extremity cyanosis, clubbing, or edema. Hip: Tenderness to palpation on anterior hip.  Decreased l flexion, extension, " abduction and adduction ROM related to pain.   Psych:  Normal mood and affect. Alert and oriented x3. Judgment and insight is normal.      PLAN:    1. Acute right hip pain  Start patient on pain medication related to acute episode of chronic hip pain, she has severe osteoarthritis in the joint and likely needs a hip replacement follow-up with Meliton De Paz in July.  - HYDROcodone-acetaminophen (NORCO) 5-325 MG Tab per tablet; Take 1-2 Tabs by mouth every four hours as needed for up to 7 days.  Dispense: 42 Tab; Refill: 0  - [facility] S[facility]EN; Future  - CONSENT FOR OPIATE PRESCRIPTION    Follow-up as needed. Patient is encouraged to be seen in the emergency room for chest pain, palpitations, shortness of breath, dizziness, severe abdominal pain or other concerning symptoms.    Please note that this dictation was created using voice recognition software. I have made every reasonable attempt to correct obvious errors, but I expect that there are errors of grammar and possibly content that I did not discover before finalizing the note.    Assessment/Plan:  1. Acute right hip pain  HYDROcodone-acetaminophen (NORCO) 5-325 MG Tab per tablet    [facility] S[facility]EN    CONSENT FOR OPIATE PRESCRIPTION   2. Chronic right hip pain            I have placed the below orders and discussed them with an approved delegating provider. The MA is performing the below orders under the direction of Dr. Watson.

## 2018-07-03 NOTE — ASSESSMENT & PLAN NOTE
Chronic in nature. Worsening. States he is having severe 10/10 pain at this time states that he has an appointment with Dr. De Paz on 7/23. States that he can't walk and that pain is keeping him awake at night. States that pain is worsening. States that with walking he will start noticing pain in shooting down the leg, into the groin. Is noticing muscle spasm in his back as well. Patient has severe osteoathritis. Has tried ibuprofen without relief, meloxicam caused diarrhea, tylenol did not improve the pain. States he believes he will need a hip replacement. Continues to be active despite severe pain. States he has been icing, is noticing some swelling. Has been using heat as well. States he has tried a peppermint cream which helps very minimally.     Acute pain   This is a new problem.   Patient is complaining of pain x 2 week(s) located in his right hip.  Pain is constant, described as sharp, aching and a 10/10 on the pain scale.   Treatments tried include:Tylenol, NSAIDs, otc ointments peppermint, heat, ice    I have assessed the patient’s risk for abuse, dependency, and addiction using the validated Opioid Risk Tool.    Opioid Risk Score: No value filed.    Interpretation of Opioid Risk Score   Score 0-3 = Low risk of abuse. Do UDS at least once per year.  Score 4-7 = Moderate risk of abuse. Do UDS 1-4 times per year.  Score 8+ = High risk of abuse. Refer to specialist.    I have conducted a physical exam and documented findings.  I certify that I have obtained and reviewed his medical history. I have also made a good guerrero effort to obtain applicable records from other providers who have treated the patient.  I have reviewed the patient's prescription history as maintained by the [place] Prescription Monitoring Program.     Given the above, I believe the benefits of controlled substance therapy outweigh the risks. The reasons for prescribing controlled substances include non-narcotic, oral analgesic alternatives  have been inadequate for pain control. Accordingly, I have discussed the risk and benefits, treatment plan, and alternative therapies with the patient. Patient was advised that this medicine is intended for short term (no more than 14 days)/intermittent use only and not intended to be an ongoing prescription.

## 2018-07-17 ENCOUNTER — OFFICE VISIT (OUTPATIENT)
Dept: MEDICAL GROUP | Facility: PHYSICIAN GROUP | Age: 68
End: 2018-07-17
Payer: MEDICARE

## 2018-07-17 VITALS
WEIGHT: 149 LBS | OXYGEN SATURATION: 98 % | HEART RATE: 87 BPM | BODY MASS INDEX: 22.07 KG/M2 | DIASTOLIC BLOOD PRESSURE: 68 MMHG | SYSTOLIC BLOOD PRESSURE: 96 MMHG | TEMPERATURE: 98.1 F | RESPIRATION RATE: 16 BRPM | HEIGHT: 69 IN

## 2018-07-17 DIAGNOSIS — G89.29 CHRONIC RIGHT HIP PAIN: ICD-10-CM

## 2018-07-17 DIAGNOSIS — M25.551 CHRONIC RIGHT HIP PAIN: ICD-10-CM

## 2018-07-17 PROCEDURE — 99214 OFFICE O/P EST MOD 30 MIN: CPT | Performed by: PHYSICIAN ASSISTANT

## 2018-07-17 RX ORDER — HYDROCODONE BITARTRATE AND ACETAMINOPHEN 5; 325 MG/1; MG/1
1-2 TABLET ORAL EVERY 4 HOURS PRN
Qty: 42 TAB | Refills: 0 | Status: SHIPPED | OUTPATIENT
Start: 2018-07-17 | End: 2018-07-24

## 2018-07-17 ASSESSMENT — PAIN SCALES - GENERAL: PAINLEVEL: NO PAIN

## 2018-07-19 ENCOUNTER — HOSPITAL ENCOUNTER (OUTPATIENT)
Dept: LAB | Facility: MEDICAL CENTER | Age: 68
End: 2018-07-19
Attending: INTERNAL MEDICINE
Payer: MEDICARE

## 2018-07-23 ENCOUNTER — HOSPITAL ENCOUNTER (OUTPATIENT)
Dept: LAB | Facility: MEDICAL CENTER | Age: 68
End: 2018-07-23
Attending: NURSE PRACTITIONER
Payer: MEDICARE

## 2018-07-23 NOTE — PROGRESS NOTES
Chief Complaint   Patient presents with   • Follow-Up     hip px       HISTORY OF PRESENT ILLNESS: Vickey Turner is an established 68 y.o. male here to discuss the evaluation and management of:    Chronic right hip pain  Patient is here today to follow-up on chronic right hip pain. States pain is a constant aching pain that will radiate down the right lower extremity. He tells me that he is following up with an orthopedic provider Dr. De Paz on 7/23/18 for further evaluation symptoms. States they will be discussing right hip replacement. He tells me that he followed up with his primary care provider on 07/03/18 and was prescribed HYDROcodone-acetaminophen (NORCO) 5-325 MG Tab per tablet and advised to take 1-2 tablets every 4 hours as needed for pain symptoms. Patient was given 42 tablets for seven days. He is requesting a refill during today's appointment. States prior to taking prescribed pain medication he was experiencing sleep deprivation symptoms. States he is taking medication he is able to sleep better. States he still occasionally wakes up but is able to fall back asleep. States he discontinued Ambien 3 weeks ago. States he discontinued Ambien on his own and felt that it was safer to not be taking Ambien while taking pain medication. Agreed with plan. States he is still stretching, using heat and occasionally ice to help alleviate symptoms. He also mentions that he is taking Epsom salt baths and using over-the-counter peppermint cream with mild alleviation of symptoms. States he is staying as active as he can.      Chronic pain recheck:   Last dose of controlled substance: Today  Chronic pain treated with HYDROcodone-acetaminophen (NORCO) 5-325 MG Tab per tablet and advised to take 1-2 tablets every 4 hours as needed for pain symptoms    He  reports that he does not drink alcohol.  He  reports that he does not use drugs.    Consequences of Chronic Opiate therapy:  (5 A's)  Analgesia: Compared to no  treatment or prior treatment, pain is currently improved  Activity: not changed  Adverse Events: He denies constipation, dry mouth, itchy skin, nausea and sedation  Aberrant Behaviors: He reports he is taking medication as prescribed and is not veering from agreed treatment regimen. There have been no inappropriate refills or lost/stolen meds reported.   Affect/Mood: Pain is not impacting patient's mood.  Patient denies depression/anxiety.    Nonnarcotic treatments that are being used: topical agents, ice and heat.   Treatment goals discussed.    Opioid Risk Score: 3    Interpretation of Opioid Risk Score   Score 0-3 = Low risk of abuse. Do UDS at least once per year.  Score 4-7 = Moderate risk of abuse. Do UDS 1-4 times per year.  Score 8+ = High risk of abuse. Refer to specialist.    No order of CONTROLLED SUBSTANCE TREATMENT AGREEMENT is found.     UDS Summary     Patient has no health maintenance due at this time        Most recent UDS reviewed today and is consistent with prescribed medications.    I have reviewed the medical records, the Prescription Monitoring Program and I have determined that controlled substance treatment is medically indicated.           Patient Active Problem List    Diagnosis Date Noted   • Chronic right hip pain 06/28/2018   • Migraine without aura and without status migrainosus, not intractable 04/24/2018   • Urinary frequency 06/29/2017   • Alcoholic liver disease, unspecified (HCC) 06/29/2017   • Gastroesophageal reflux disease with esophagitis 08/05/2015   • Lumbar radiculopathy 05/15/2015   • DDD (degenerative disc disease), lumbar 05/15/2015   • Chronic back pain 05/15/2015   • Diverticulitis 11/19/2014   • Insomnia 11/19/2014   • Alcohol dependence (HCC) 11/19/2014   • Marijuana use 11/18/2014   • Seasonal allergies 10/29/2014       Allergies:Pantoprazole and Pantoprazole    Current Outpatient Prescriptions   Medication Sig Dispense Refill   • HYDROcodone-acetaminophen (NORCO)  5-325 MG Tab per tablet Take 1-2 Tabs by mouth every four hours as needed for up to 7 days. 42 Tab 0   • meloxicam (MOBIC) 15 MG tablet Take 1 Tab by mouth every day. 30 Tab 1   • acetaminophen/caffeine/butalbital 325-40-50 mg (FIORICET) -40 MG Tab Take 1 Tab by mouth every four hours as needed for Headache. 30 Tab 0   • zolpidem (AMBIEN) 5 MG Tab Take 5 mg by mouth at bedtime as needed for Sleep.     • fluticasone (FLONASE) 50 MCG/ACT nasal spray Spray 1 Spray in nose every day. 16 g 0   • ranitidine (ZANTAC) 150 MG Tab Take 1 Tab by mouth 2 times a day. Indications: Gastroesophageal Reflux Disease 180 Tab 3   • vitamin D (CHOLECALCIFEROL) 1000 UNIT Tab Take 1,000 Units by mouth every day.       No current facility-administered medications for this visit.        Social History   Substance Use Topics   • Smoking status: Never Smoker   • Smokeless tobacco: Never Used      Comment: +second-hand exposure   • Alcohol use No      Comment: quit 4  years        Family Status   Relation Status   • Mother    • Father    • Brother Alive   • Maternal Grandmother    • Maternal Grandfather    • Paternal Grandmother    • Paternal Grandfather      Family History   Problem Relation Age of Onset   • Dementia Mother    • Heart Disease Father        ROS:  Review of Systems   Constitutional: Negative for fever, chills, weight loss and malaise/fatigue.   HENT: Negative for ear pain, nosebleeds, congestion, sore throat and neck pain.    Eyes: Negative for blurred vision.   Respiratory: Negative for cough, sputum production, shortness of breath and wheezing.    Cardiovascular: Negative for chest pain, palpitations, orthopnea and leg swelling.   Gastrointestinal: Negative for heartburn, nausea, vomiting and abdominal pain.   Genitourinary: Negative for dysuria, urgency and frequency.   Musculoskeletal: Negative for myalgias, back pain. + for right hip pain.   Skin: Negative for rash and  "itching.   Neurological: Negative for dizziness, tingling, tremors, sensory change, focal weakness and headaches.   Endo/Heme/Allergies: Does not bruise/bleed easily.   Psychiatric/Behavioral: Negative for depression, suicidal ideas and memory loss.  The patient is not nervous/anxious and does not have insomnia.    All other systems reviewed and are negative except as in HPI.    Exam: Blood pressure (!) 96/68, pulse 87, temperature 36.7 °C (98.1 °F), resp. rate 16, height 1.753 m (5' 9\"), weight 67.6 kg (149 lb), SpO2 98 %. Body mass index is 22 kg/m².  General: Normal appearing. No distress.  HEENT: Normocephalic. Eyes conjunctiva clear lids without ptosis, pupils equal and reactive to light accommodation, ears normal shape and contour, canals are clear bilaterally, tympanic membranes are benign, nasal mucosa benign, oropharynx is without erythema, edema or exudates.   Neck: Supple without JVD or bruit. Thyroid is not enlarged.  Pulmonary: Clear to ausculation.  Normal effort. No rales, ronchi, or wheezing.  Cardiovascular: Regular rate and rhythm without murmur. Carotid and radial pulses are intact and equal bilaterally.  Abdomen: Soft, nontender, nondistended. Normal bowel sounds. Liver and spleen are not palpable  Neurologic: Grossly nonfocal.  Cranial nerves are normal. LE DTR's normal and symmetric.  Lymph: No cervical, supraclavicular or axillary lymph nodes are palpable  Skin: Warm and dry.  No rashes or suspicious skin lesions.  Musculoskeletal: No extremity cyanosis, clubbing, or edema. Positive for pain with right external and internal hip rotation. Positive right leg raise. Positive for antalgic gait.  Psych: Normal mood and affect. Alert and oriented x3. Judgment and insight is normal.    Medical decision-making and discussion:  1. Chronic right hip pain  Patient understands this prescription is a controlled substance which is potentially habit-forming and its use is regulated by the [facility]. We also " "discussed the new \"black box\" warning regarding the lethal combination of opioids and benzodiazepines. Refills are subject to terms of a controlled substance agreement and patient has an updated one on file. Most recent UDS is appropriate. Any refill requires an office visit. Narcotics have may adverse effects and the risks of addiction, accidental overdose and death were emphasized. Provided prescriptions for the next 7 days.    Advised patient to continue resting, exercising, stretching, using heat and ice as needed and continue taking Epsom salt baths.    - HYDROcodone-acetaminophen (NORCO) 5-325 MG Tab per tablet; Take 1-2 Tabs by mouth every four hours as needed for up to 7 days.  Dispense: 42 Tab; Refill: 0  - CONSENT FOR OPIATE PRESCRIPTION      Follow-up for worsening symptoms,lack of expected recovery, or should new symptoms or problems arise.      Please note that this dictation was created using voice recognition software. I have made every reasonable attempt to correct obvious errors, but I expect that there are errors of grammar and possibly content that I did not discover before finalizing the note.      Return if symptoms worsen or fail to improve.  "

## 2018-07-24 ENCOUNTER — HOSPITAL ENCOUNTER (OUTPATIENT)
Dept: LAB | Facility: MEDICAL CENTER | Age: 68
End: 2018-07-24
Attending: INTERNAL MEDICINE
Payer: MEDICARE

## 2018-07-24 ENCOUNTER — OFFICE VISIT (OUTPATIENT)
Dept: MEDICAL GROUP | Facility: PHYSICIAN GROUP | Age: 68
End: 2018-07-24
Payer: MEDICARE

## 2018-07-24 VITALS
SYSTOLIC BLOOD PRESSURE: 132 MMHG | HEIGHT: 69 IN | BODY MASS INDEX: 22.07 KG/M2 | RESPIRATION RATE: 16 BRPM | WEIGHT: 149 LBS | HEART RATE: 68 BPM | OXYGEN SATURATION: 97 % | DIASTOLIC BLOOD PRESSURE: 70 MMHG | TEMPERATURE: 97.6 F

## 2018-07-24 DIAGNOSIS — K70.9 ALCOHOLIC LIVER DISEASE, UNSPECIFIED (HCC): ICD-10-CM

## 2018-07-24 DIAGNOSIS — M25.551 CHRONIC RIGHT HIP PAIN: ICD-10-CM

## 2018-07-24 DIAGNOSIS — M25.551 ACUTE RIGHT HIP PAIN: ICD-10-CM

## 2018-07-24 DIAGNOSIS — G89.29 CHRONIC RIGHT HIP PAIN: ICD-10-CM

## 2018-07-24 DIAGNOSIS — Z01.818 PRE-OP EXAMINATION: ICD-10-CM

## 2018-07-24 DIAGNOSIS — F10.21 ALCOHOL DEPENDENCE IN REMISSION (HCC): ICD-10-CM

## 2018-07-24 LAB
ALBUMIN SERPL BCP-MCNC: 4.5 G/DL (ref 3.2–4.9)
ALBUMIN/GLOB SERPL: 1.6 G/DL
ALP SERPL-CCNC: 93 U/L (ref 30–99)
ALT SERPL-CCNC: 15 U/L (ref 2–50)
ANION GAP SERPL CALC-SCNC: 8 MMOL/L (ref 0–11.9)
AST SERPL-CCNC: 21 U/L (ref 12–45)
BASOPHILS # BLD AUTO: 0.8 % (ref 0–1.8)
BASOPHILS # BLD: 0.04 K/UL (ref 0–0.12)
BILIRUB SERPL-MCNC: 0.7 MG/DL (ref 0.1–1.5)
BUN SERPL-MCNC: 15 MG/DL (ref 8–22)
CALCIUM SERPL-MCNC: 9.7 MG/DL (ref 8.5–10.5)
CHLORIDE SERPL-SCNC: 102 MMOL/L (ref 96–112)
CO2 SERPL-SCNC: 29 MMOL/L (ref 20–33)
CREAT SERPL-MCNC: 0.91 MG/DL (ref 0.5–1.4)
EOSINOPHIL # BLD AUTO: 0.11 K/UL (ref 0–0.51)
EOSINOPHIL NFR BLD: 2.2 % (ref 0–6.9)
ERYTHROCYTE [DISTWIDTH] IN BLOOD BY AUTOMATED COUNT: 44.9 FL (ref 35.9–50)
GLOBULIN SER CALC-MCNC: 2.9 G/DL (ref 1.9–3.5)
GLUCOSE SERPL-MCNC: 85 MG/DL (ref 65–99)
HCT VFR BLD AUTO: 43.4 % (ref 42–52)
HGB BLD-MCNC: 14.5 G/DL (ref 14–18)
IMM GRANULOCYTES # BLD AUTO: 0.01 K/UL (ref 0–0.11)
IMM GRANULOCYTES NFR BLD AUTO: 0.2 % (ref 0–0.9)
INR PPP: 1 (ref 0.87–1.13)
LYMPHOCYTES # BLD AUTO: 1.28 K/UL (ref 1–4.8)
LYMPHOCYTES NFR BLD: 25 % (ref 22–41)
MCH RBC QN AUTO: 32.3 PG (ref 27–33)
MCHC RBC AUTO-ENTMCNC: 33.4 G/DL (ref 33.7–35.3)
MCV RBC AUTO: 96.7 FL (ref 81.4–97.8)
MONOCYTES # BLD AUTO: 0.57 K/UL (ref 0–0.85)
MONOCYTES NFR BLD AUTO: 11.2 % (ref 0–13.4)
NEUTROPHILS # BLD AUTO: 3.1 K/UL (ref 1.82–7.42)
NEUTROPHILS NFR BLD: 60.6 % (ref 44–72)
NRBC # BLD AUTO: 0 K/UL
NRBC BLD-RTO: 0 /100 WBC
PLATELET # BLD AUTO: 216 K/UL (ref 164–446)
PMV BLD AUTO: 10.1 FL (ref 9–12.9)
POTASSIUM SERPL-SCNC: 4.1 MMOL/L (ref 3.6–5.5)
PROT SERPL-MCNC: 7.4 G/DL (ref 6–8.2)
PROTHROMBIN TIME: 12.9 SEC (ref 12–14.6)
RBC # BLD AUTO: 4.49 M/UL (ref 4.7–6.1)
SODIUM SERPL-SCNC: 139 MMOL/L (ref 135–145)
WBC # BLD AUTO: 5.1 K/UL (ref 4.8–10.8)

## 2018-07-24 PROCEDURE — 84450 TRANSFERASE (AST) (SGOT): CPT | Mod: XU

## 2018-07-24 PROCEDURE — 93000 ELECTROCARDIOGRAM COMPLETE: CPT | Performed by: NURSE PRACTITIONER

## 2018-07-24 PROCEDURE — 84460 ALANINE AMINO (ALT) (SGPT): CPT | Mod: XU

## 2018-07-24 PROCEDURE — 82947 ASSAY GLUCOSE BLOOD QUANT: CPT | Mod: XU

## 2018-07-24 PROCEDURE — 85610 PROTHROMBIN TIME: CPT

## 2018-07-24 PROCEDURE — 82172 ASSAY OF APOLIPOPROTEIN: CPT

## 2018-07-24 PROCEDURE — 83883 ASSAY NEPHELOMETRY NOT SPEC: CPT

## 2018-07-24 PROCEDURE — 99213 OFFICE O/P EST LOW 20 MIN: CPT | Mod: 25 | Performed by: NURSE PRACTITIONER

## 2018-07-24 PROCEDURE — 85025 COMPLETE CBC W/AUTO DIFF WBC: CPT

## 2018-07-24 PROCEDURE — 82465 ASSAY BLD/SERUM CHOLESTEROL: CPT

## 2018-07-24 PROCEDURE — 83010 ASSAY OF HAPTOGLOBIN QUANT: CPT

## 2018-07-24 PROCEDURE — 36415 COLL VENOUS BLD VENIPUNCTURE: CPT

## 2018-07-24 PROCEDURE — 82247 BILIRUBIN TOTAL: CPT | Mod: XU

## 2018-07-24 PROCEDURE — 84478 ASSAY OF TRIGLYCERIDES: CPT

## 2018-07-24 PROCEDURE — 80053 COMPREHEN METABOLIC PANEL: CPT

## 2018-07-24 PROCEDURE — 82977 ASSAY OF GGT: CPT

## 2018-07-24 RX ORDER — HYDROCODONE BITARTRATE AND ACETAMINOPHEN 5; 325 MG/1; MG/1
1 TABLET ORAL EVERY 8 HOURS PRN
Qty: 42 TAB | Refills: 0 | Status: SHIPPED | OUTPATIENT
Start: 2018-07-24 | End: 2018-08-07

## 2018-07-24 ASSESSMENT — PAIN SCALES - GENERAL: PAINLEVEL: NO PAIN

## 2018-07-24 NOTE — ASSESSMENT & PLAN NOTE
Chronic in nature.  Stable.  Most recent lab results were within normal limits, patient states he is getting labs drawn today. patient continues to follow with Dr. Dinero and gastroenterology.

## 2018-07-24 NOTE — ASSESSMENT & PLAN NOTE
Chronic in nature.  Stable.  Patient has a history of alcoholism states he has not had a drink in 4 years.

## 2018-07-24 NOTE — ASSESSMENT & PLAN NOTE
Chronic in nature.  Acute episode started several weeks ago.  Patient is following with Dr. Meliton De Paz orthopedics, plan for right total hip replacement hopefully in early August.  Patient states that pain is generally 10 of 10 states that it is sharp, states that he notices a lot of grinding.  Pain is worse with range of motion, walking, standing.  Patient continues to take pain medication as needed and states that it generally works well.

## 2018-07-24 NOTE — PROGRESS NOTES
REASON FOR VISIT: Pre-Op Consultation  Consultation Requested by: Dr. Meliton De Paz  Procedure date and type: Right Total Hip Arthroplasty, August 2018    History of condition for which surgery is planned: States that his hip and groin pain started in September/October 2017 and become severe over the last several weeks. States he was noticing weakness and increasing pain. Hip is bone on bone with spurs. Pain is 10/10.     Alcohol dependence in remission (HCC)  Chronic in nature.  Stable.  Patient has a history of alcoholism states he has not had a drink in 4 years.    Alcoholic liver disease, unspecified (HCC)  Chronic in nature.  Stable.  Most recent lab results were within normal limits, patient states he is getting labs drawn today. patient continues to follow with Dr. Dinero and gastroenterology.    Chronic right hip pain  Chronic in nature.  Acute episode started several weeks ago.  Patient is following with Dr. Meliton De Paz orthopedics, plan for right total hip replacement hopefully in early August.  Patient states that pain is generally 10 of 10 states that it is sharp, states that he notices a lot of grinding.  Pain is worse with range of motion, walking, standing.  Patient continues to take pain medication as needed and states that it generally works well.      Current chronic conditions: has Seasonal allergies; Marijuana use; Diverticulitis; Insomnia; Alcohol dependence (HCC); Lumbar radiculopathy; DDD (degenerative disc disease), lumbar; Chronic back pain; Gastroesophageal reflux disease with esophagitis; Urinary frequency; Alcoholic liver disease, unspecified (HCC); Migraine without aura and without status migrainosus, not intractable; and Chronic right hip pain on his problem list.  Past medical history:  has a past medical history of Chronic back pain; Diverticulitis; and GERD (gastroesophageal reflux disease).. Negative for: CAD, SBE, CVA, TIA, DVT, PE, bleeding requiring transfusion,  "intubation.  Surgical and anesthetic history:  has a past surgical history that includes colonoscopy - endo (11/17/2014). Prior surgery without complication, bleeding, reaction to anesthetic, prolonged recovery  Habits:   Social History   Substance Use Topics   • Smoking status: Never Smoker   • Smokeless tobacco: Never Used      Comment: +second-hand exposure   • Alcohol use No      Comment: quit 4  years      Allergies: Pantoprazole and Pantoprazole No known allergy to Anesthetic, or Latex.     Current medicines:   Current Outpatient Prescriptions   Medication Sig Dispense Refill   • HYDROcodone-acetaminophen (NORCO) 5-325 MG Tab per tablet Take 1-2 Tabs by mouth every four hours as needed for up to 7 days. 42 Tab 0   • zolpidem (AMBIEN) 5 MG Tab Take 5 mg by mouth at bedtime as needed for Sleep.     • ranitidine (ZANTAC) 150 MG Tab Take 1 Tab by mouth 2 times a day. Indications: Gastroesophageal Reflux Disease 180 Tab 3   • vitamin D (CHOLECALCIFEROL) 1000 UNIT Tab Take 1,000 Units by mouth every day.     • acetaminophen/caffeine/butalbital 325-40-50 mg (FIORICET) -40 MG Tab Take 1 Tab by mouth every four hours as needed for Headache. 30 Tab 0   • fluticasone (FLONASE) 50 MCG/ACT nasal spray Spray 1 Spray in nose every day. 16 g 0     No current facility-administered medications for this visit.      Anticoagulant: ASA, NSAIDs    Herbals: Black Cohash, Echinacea, Garlic, Ginger, Ginkgo Biloba, Ginseng, Kava, Claudia’s Wort,  Saw Palmetto, Valerian             ROS: negative for: CP, SOB, VELARDE, Orthopnea, wheezing, leg edema, polydipsia, polyuria, fevers, chills, sweats, cough, cold, congestion, abd pain, reflux, black or bloody stools, weight loss/gain.  Functional Status: ambulatory, cane every so often    PHYSICAL EXAMINATION:  VITAL SIGNS: Blood pressure 132/70, pulse 68, temperature 36.4 °C (97.6 °F), resp. rate 16, height 1.753 m (5' 9\"), weight 67.6 kg (149 lb), SpO2 97 %. Body mass index is 22 " kg/m².  HEENT: EOMI, PERRL. Oropharynx pink, moist. Normal airway. Neck supple, no cervical lymphadenopathy.  LUNGS: CTAB good excursion.   HEART: RRR no murmur.  ABDOMEN: soft, nondistended, nontender normal BS. No HSM.  LOWER EXTREMITIES: warm and well perfused with no edema.    Labs: See attached.    EKG Interpretation   Interpreted by me   Rhythm: normal sinus   Rate: normal   Conduction: normal   ST Segments: no acute change   T Waves: no acute change   Q Waves: none   Clinical Impression: no acute changes and normal EKG      IMPRESSION:  The encounter diagnosis was Chronic right hip pain.  1. Planned surgery: Right Total Hip Arthroplasty   2. High risk medical conditions: negative for Cardiac, Pulmonary, Bleeding, Poor healing, Thrombosis, Debility    PLAN:  1. Chronic medical conditions: Stable and controlled. Continue current medicines.   2. Avoid drugs that potentiate bleeding as advised by surgeon  3. Discontinue all herbal supplements 2 weeks prior to surgery.  4. Need for SBE prophylaxis: no  5. This patient is considered Low risk for cardiopulmonary complications for this planned surgery.  6. We will await results of chest x-ray to send clearance.    Michael Index for assessing perioperative cardiovascular risk [Circulation 1999;100:1047]:   (one point each for * high-risk surgery [ intrathoracic, suprainguinal vascular, intraperitoneal ],* Hx ischemic heart dz, * Hx CHF, *Hx TIA or CVA, *IDDM, *Serum Cr >2.0)   Interpretation of risk: (Complication = MI, Pulm edema, v-fib or cardiac arrest, complete heart block)  Very Low: 0 points = 0.4 % complication. Low: 1 point = 0.9 %, Moderate: 2 points = 6.6 %, High: 3+ points = 11%.

## 2018-07-31 LAB — MISCELLANEOUS LAB RESULT MISCLAB: NORMAL

## 2018-08-23 ENCOUNTER — OFFICE VISIT (OUTPATIENT)
Dept: MEDICAL GROUP | Facility: PHYSICIAN GROUP | Age: 68
End: 2018-08-23
Payer: MEDICARE

## 2018-08-23 VITALS
OXYGEN SATURATION: 98 % | RESPIRATION RATE: 14 BRPM | HEART RATE: 98 BPM | HEIGHT: 69 IN | TEMPERATURE: 98.1 F | BODY MASS INDEX: 21.03 KG/M2 | SYSTOLIC BLOOD PRESSURE: 122 MMHG | WEIGHT: 142 LBS | DIASTOLIC BLOOD PRESSURE: 74 MMHG

## 2018-08-23 DIAGNOSIS — Z96.641 STATUS POST RIGHT HIP REPLACEMENT: ICD-10-CM

## 2018-08-23 PROCEDURE — 99213 OFFICE O/P EST LOW 20 MIN: CPT | Performed by: NURSE PRACTITIONER

## 2018-08-23 RX ORDER — HYDROCODONE BITARTRATE AND ACETAMINOPHEN 5; 325 MG/1; MG/1
1 TABLET ORAL DAILY
COMMUNITY
Start: 2018-08-20 | End: 2018-09-04 | Stop reason: SDUPTHER

## 2018-08-23 NOTE — ASSESSMENT & PLAN NOTE
Patient had hip replacement 2 weeks ago.  Patient is doing well at this time.  Is walking with a cane.  States he still having some sharp stabbing pain in the hip states that movement makes it worse, rest does improve it.   incision site is healing well.  Patient saw his surgeon on Monday and is released back to us for further pain management.  Surgeon also told him that he could have continuing pain over the next 3 months.  Patient states that he is excited to get back to skiing and golf and is motivated to do well over the next several weeks.  Patient does have an appointment set up with physical therapy.

## 2018-08-23 NOTE — PROGRESS NOTES
Chief Complaint   Patient presents with   • Hospital Follow-up     hip replacement        HISTORY OF PRESENT ILLNESS: Patient is a 68 y.o. male established patient who presents today to discuss hip replacement.    Status post right hip replacement  Patient had hip replacement 2 weeks ago.  Patient is doing well at this time.  Is walking with a cane.  States he still having some sharp stabbing pain in the hip states that movement makes it worse, rest does improve it.   incision site is healing well.  Patient saw his surgeon on Monday and is released back to us for further pain management.  Surgeon also told him that he could have continuing pain over the next 3 months.  Patient states that he is excited to get back to skiing and golf and is motivated to do well over the next several weeks.  Patient does have an appointment set up with physical therapy.      Patient Active Problem List    Diagnosis Date Noted   • Status post right hip replacement 08/23/2018   • Chronic right hip pain 06/28/2018   • Migraine without aura and without status migrainosus, not intractable 04/24/2018   • Urinary frequency 06/29/2017   • Alcoholic liver disease, unspecified ([facility]) 06/29/2017   • Gastroesophageal reflux disease with esophagitis 08/05/2015   • Lumbar radiculopathy 05/15/2015   • DDD (degenerative disc disease), lumbar 05/15/2015   • Chronic back pain 05/15/2015   • Diverticulitis 11/19/2014   • Insomnia 11/19/2014   • Alcohol dependence in remission ([facility]) 11/19/2014   • Marijuana use 11/18/2014   • Seasonal allergies 10/29/2014       Allergies:Pantoprazole and Pantoprazole    Current Outpatient Prescriptions   Medication Sig Dispense Refill   • acetaminophen/caffeine/butalbital 325-40-50 mg (FIORICET) -40 MG Tab Take 1 Tab by mouth every four hours as needed for Headache. 30 Tab 0   • zolpidem (AMBIEN) 5 MG Tab Take 5 mg by mouth at bedtime as needed for Sleep.     • fluticasone (FLONASE) 50 MCG/ACT nasal spray Spray 1  Spray in nose every day. 16 g 0   • ranitidine (ZANTAC) 150 MG Tab Take 1 Tab by mouth 2 times a day. Indications: Gastroesophageal Reflux Disease 180 Tab 3   • vitamin D (CHOLECALCIFEROL) 1000 UNIT Tab Take 1,000 Units by mouth every day.     • HYDROcodone-acetaminophen (NORCO) 5-325 MG Tab per tablet Take 1 Tab by mouth every day.       No current facility-administered medications for this visit.        Social History   Substance Use Topics   • Smoking status: Never Smoker   • Smokeless tobacco: Never Used      Comment: +second-hand exposure   • Alcohol use No      Comment: quit 4  years        Family Status   Relation Status   • Mo    • Fa    • Bro Alive   • MGMo    • MGFa    • PGMo    • PGFa      Family History   Problem Relation Age of Onset   • Dementia Mother    • Heart Disease Father        Review of Systems:   Constitutional:  Negative for fever, chills, weight loss and malaise/fatigue.   HEENT:  Negative for ear pain, nosebleeds, congestion, sore throat and neck pain.    Eyes:  Negative for blurred vision.   Respiratory:  Negative for cough, sputum production, shortness of breath and wheezing.    Cardiovascular:  Negative for chest pain, palpitations, orthopnea and leg swelling.   Gastrointestinal:  Negative for heartburn, nausea, vomiting and abdominal pain.   Genitourinary:  Negative for dysuria, urgency and frequency.   Musculoskeletal:  Negative for myalgias, back pain and positive joint pain.   Skin:  Negative for rash and itching.   Neurological:  Negative for dizziness, tingling, tremors, sensory change, focal weakness and headaches.   Endo/Heme/Allergies:  Does not bruise/bleed easily.   Psychiatric/Behavioral:  Negative for depression, suicidal ideas and memory loss.  The patient is not nervous/anxious and does not have insomnia.    All other systems reviewed and are negative except as in HPI.    Exam:  Blood pressure 122/74, pulse 98, temperature  "36.7 °C (98.1 °F), resp. rate 14, height 1.753 m (5' 9\"), weight 64.4 kg (142 lb), SpO2 98 %.  General:  Normal appearing. No distress.  HEENT:  Normocephalic. Eyes conjunctiva clear lids without ptosis, pupils equal and reactive to light accommodation, ears normal shape and contour, canals are clear bilaterally, tympanic membranes are benign, nasal mucosa benign, oropharynx is without erythema, edema or exudates.   Neck:  Supple without JVD or bruit. Thyroid is not enlarged.  Pulmonary:  Clear to ausculation.  Normal effort. No rales, ronchi, or wheezing.  Cardiovascular:  Regular rate and rhythm without murmur. Carotid and radial pulses are intact and equal bilaterally.  Abdomen:  Soft, nontender, nondistended. Normal bowel sounds. Liver and spleen are not palpable  Neurologic:  Grossly nonfocal  Lymph:  No cervical, supraclavicular or axillary lymph nodes are palpable  Skin:  Warm and dry.  No obvious lesions.  Musculoskeletal:  Normal gait. No extremity cyanosis, clubbing, or edema.  Patient has limited range of motion in his right hip with pain, states that orthopedic surgeon assess this on Monday and stated it was within normal limits.  Psych:  Normal mood and affect. Alert and oriented x3. Judgment and insight is normal.      PLAN:    1. Status post right hip replacement  Patient will follow-up September 4 for return to work letter as well as possible pain medication refill depending on symptoms.  Patient is advised to continue abstaining from Ambien  Discussed use of ice, heat.    Follow-up September 4 or sooner as needed. Patient is encouraged to be seen in the emergency room for chest pain, palpitations, shortness of breath, dizziness, severe abdominal pain or other concerning symptoms.    Please note that this dictation was created using voice recognition software. I have made every reasonable attempt to correct obvious errors, but I expect that there are errors of grammar and possibly content that I did " not discover before finalizing the note.    Assessment/Plan:  1. Status post right hip replacement            I have placed the below orders and discussed them with an approved delegating provider. The MA is performing the below orders under the direction of Dr. Frank.

## 2018-08-31 ENCOUNTER — TELEPHONE (OUTPATIENT)
Dept: MEDICAL GROUP | Facility: PHYSICIAN GROUP | Age: 68
End: 2018-08-31

## 2018-08-31 NOTE — TELEPHONE ENCOUNTER
ESTABLISHED PATIENT PRE-VISIT PLANNING     Note: Patient will not be contacted if there is no indication to call.     1.  Reviewed notes from the last few office visits within the medical group: Yes    2.  If any orders were placed at last visit or intended to be done for this visit (i.e. 6 mos follow-up), do we have Results/Consult Notes?        •  Labs - Labs were not ordered at last office visit.   Note: If patient appointment is for lab review and patient did not complete labs, check with provider if OK to reschedule patient until labs completed.       •  Imaging - Imaging was not ordered at last office visit.       •  Referrals - No referrals were ordered at last office visit.    3. Is this appointment scheduled as a Hospital Follow-Up? No    4.  Immunizations were updated in Epic using [employer]?: Epic matches [employer]       •  [employer] Recommendations: FLU, TD and ZOSTAVAX (Shingles)    5.  Patient is due for the following Health Maintenance Topics:   Health Maintenance Due   Topic Date Due   • IMM HEP B VACCINE (1 of 3 - Risk 3-dose series) 03/17/1969   • IMM ZOSTER VACCINES (1 of 2) 03/17/2002   • IMM INFLUENZA (1) 09/01/2018       - Patient has completed FLU, PREVNAR (PCV13)  and TDAP Immunization(s) per [employer]. Chart has been updated.    6.  MDX printed for Provider? NO    7.  Patient was NOT informed to arrive 15 min prior to their scheduled appointment and bring in their medication bottles.

## 2018-09-04 ENCOUNTER — OFFICE VISIT (OUTPATIENT)
Dept: MEDICAL GROUP | Facility: PHYSICIAN GROUP | Age: 68
End: 2018-09-04
Payer: MEDICARE

## 2018-09-04 VITALS
HEIGHT: 69 IN | WEIGHT: 143 LBS | RESPIRATION RATE: 16 BRPM | OXYGEN SATURATION: 97 % | HEART RATE: 81 BPM | DIASTOLIC BLOOD PRESSURE: 70 MMHG | TEMPERATURE: 97.9 F | BODY MASS INDEX: 21.18 KG/M2 | SYSTOLIC BLOOD PRESSURE: 126 MMHG

## 2018-09-04 DIAGNOSIS — M25.551 CHRONIC RIGHT HIP PAIN: ICD-10-CM

## 2018-09-04 DIAGNOSIS — G89.29 CHRONIC RIGHT HIP PAIN: ICD-10-CM

## 2018-09-04 DIAGNOSIS — Z96.641 STATUS POST RIGHT HIP REPLACEMENT: ICD-10-CM

## 2018-09-04 PROCEDURE — 99214 OFFICE O/P EST MOD 30 MIN: CPT | Performed by: NURSE PRACTITIONER

## 2018-09-04 RX ORDER — HYDROCODONE BITARTRATE AND ACETAMINOPHEN 5; 325 MG/1; MG/1
1-2 TABLET ORAL EVERY 4 HOURS PRN
Qty: 60 TAB | Refills: 0 | Status: SHIPPED | OUTPATIENT
Start: 2018-09-04 | End: 2018-09-17 | Stop reason: SDUPTHER

## 2018-09-04 ASSESSMENT — PAIN SCALES - GENERAL: PAINLEVEL: 8=MODERATE-SEVERE PAIN

## 2018-09-04 NOTE — PROGRESS NOTES
Chief Complaint   Patient presents with   • Hip Pain     a little better; pain off and on        HISTORY OF PRESENT ILLNESS: Patient is a 68 y.o. male established patient who presents today to discuss hip pain.    Status post right hip replacement  States things are slowly getting better. States that he is having days where pain is 5-6/10, but states that he will have a 8-9/10 pain in his right hip some days. States he has had a few days where he has been unable to get out of bed. States that he wants to see a physical therapist.  Patient states that overall he is slowly improving.  Patient is excited to get back to skiing, golf, states he is motivated to continue to improve.     Acute pain   This is a new problem.   Patient is complaining of pain x 8 week(s) located in his right hip.  Pain is intermittent, described as sharp, throbbing and a 8/10 on the pain scale.   Treatments tried include:Tylenol, NSAIDs, otc ointments, heat, ice, rest, movement    I have assessed the patient’s risk for abuse, dependency, and addiction using the validated Opioid Risk Tool.    Opioid Risk Score: 3    Interpretation of Opioid Risk Score   Score 0-3 = Low risk of abuse. Do UDS at least once per year.  Score 4-7 = Moderate risk of abuse. Do UDS 1-4 times per year.  Score 8+ = High risk of abuse. Refer to specialist.    I have conducted a physical exam and documented findings.  I certify that I have obtained and reviewed his medical history. I have also made a good guerrero effort to obtain applicable records from other providers who have treated the patient.  I have reviewed the patient's prescription history as maintained by the [place] Prescription Monitoring Program.     Given the above, I believe the benefits of controlled substance therapy outweigh the risks. The reasons for prescribing controlled substances include non-narcotic, oral analgesic alternatives have been inadequate for pain control. Accordingly, I have discussed the  risk and benefits, treatment plan, and alternative therapies with the patient. Patient was advised that this medicine is intended for short term (no more than 14 days)/intermittent use only and not intended to be an ongoing prescription.        Patient Active Problem List    Diagnosis Date Noted   • Status post right hip replacement 08/23/2018   • Chronic right hip pain 06/28/2018   • Migraine without aura and without status migrainosus, not intractable 04/24/2018   • Urinary frequency 06/29/2017   • Alcoholic liver disease, unspecified ([facility]) 06/29/2017   • Gastroesophageal reflux disease with esophagitis 08/05/2015   • Lumbar radiculopathy 05/15/2015   • DDD (degenerative disc disease), lumbar 05/15/2015   • Chronic back pain 05/15/2015   • Diverticulitis 11/19/2014   • Insomnia 11/19/2014   • Alcohol dependence in remission ([facility]) 11/19/2014   • Marijuana use 11/18/2014   • Seasonal allergies 10/29/2014       Allergies:Pantoprazole and Pantoprazole    Current Outpatient Prescriptions   Medication Sig Dispense Refill   • HYDROcodone-acetaminophen (NORCO) 5-325 MG Tab per tablet Take 1-2 Tabs by mouth every four hours as needed for up to 10 days. 60 Tab 0   • acetaminophen/caffeine/butalbital 325-40-50 mg (FIORICET) -40 MG Tab Take 1 Tab by mouth every four hours as needed for Headache. 30 Tab 0   • zolpidem (AMBIEN) 5 MG Tab Take 5 mg by mouth at bedtime as needed for Sleep.     • fluticasone (FLONASE) 50 MCG/ACT nasal spray Spray 1 Spray in nose every day. 16 g 0   • ranitidine (ZANTAC) 150 MG Tab Take 1 Tab by mouth 2 times a day. Indications: Gastroesophageal Reflux Disease 180 Tab 3   • vitamin D (CHOLECALCIFEROL) 1000 UNIT Tab Take 1,000 Units by mouth every day.       No current facility-administered medications for this visit.        Social History   Substance Use Topics   • Smoking status: Never Smoker   • Smokeless tobacco: Never Used      Comment: +second-hand exposure   • Alcohol use No       "Comment: quit 4  years        Family Status   Relation Status   • Mo    • Fa    • Bro Alive   • MGMo    • MGFa    • PGMo    • PGFa      Family History   Problem Relation Age of Onset   • Dementia Mother    • Heart Disease Father        Review of Systems:   Constitutional:  Negative for fever, chills, weight loss and malaise/fatigue.   HEENT:  Negative for ear pain, nosebleeds, congestion, sore throat and neck pain.    Eyes:  Negative for blurred vision.   Respiratory:  Negative for cough, sputum production, shortness of breath and wheezing.    Cardiovascular:  Negative for chest pain, palpitations, orthopnea and leg swelling.   Gastrointestinal:  Negative for heartburn, nausea, vomiting and abdominal pain.   Genitourinary:  Negative for dysuria, urgency and frequency.   Musculoskeletal:  Negative for myalgias, back pain and joint pain.   Skin:  Negative for rash and itching.   Neurological:  Negative for dizziness, tingling, tremors, sensory change, focal weakness and headaches.   Endo/Heme/Allergies:  Does not bruise/bleed easily.   Psychiatric/Behavioral:  Negative for depression, suicidal ideas and memory loss.  The patient is not nervous/anxious and does not have insomnia.    All other systems reviewed and are negative except as in HPI.    Exam:  Blood pressure 126/70, pulse 81, temperature 36.6 °C (97.9 °F), resp. rate 16, height 1.753 m (5' 9\"), weight 64.9 kg (143 lb), SpO2 97 %.  General:  Normal appearing. No distress.  Pulmonary:  Clear to ausculation.  Normal effort. No rales, ronchi, or wheezing.  Cardiovascular:  Regular rate and rhythm without murmur. Carotid and radial pulses are intact and equal bilaterally.  Neurologic:  Grossly nonfocal  Lymph:  No cervical, supraclavicular or axillary lymph nodes are palpable  Skin:  Warm and dry.  No obvious lesions.  Musculoskeletal:  Normal gait. No extremity cyanosis, clubbing, or edema.  Psych:  Normal mood and " "affect. Alert and oriented x3. Judgment and insight is normal.      PLAN:    1. Status post right hip replacement  2. Chronic right hip pain  Patient will continue physical therapy, pain medication as needed plan to start weaning patient down and off pain medication on 9/17/2018. Patient understands this prescription is a controlled substance which is potentially habit-forming and its use is regulated by the [employer]. We also discussed the new \"black box\" warning regarding the lethal combination of opioids and benzodiazepines. Refills are subject to terms of a controlled substance agreement and patient has an updated one on file. Any refill requires an office visit. This medicine can cause nausea, significant constipation, sedation, confusion and accidental overdose.  - REFERRAL TO PHYSICAL THERAPY Reason for Therapy: Eval/Treat/Report  - HYDROcodone-acetaminophen (NORCO) 5-325 MG Tab per tablet; Take 1-2 Tabs by mouth every four hours as needed for up to 10 days.  Dispense: 60 Tab; Refill: 0  - CONSENT FOR OPIATE PRESCRIPTION    Follow-up in 2 weeks or sooner as needed. Patient is encouraged to be seen in the emergency room for chest pain, palpitations, shortness of breath, dizziness, severe abdominal pain or other concerning symptoms.    Please note that this dictation was created using voice recognition software. I have made every reasonable attempt to correct obvious errors, but I expect that there are errors of grammar and possibly content that I did not discover before finalizing the note.    Assessment/Plan:  1. Status post right hip replacement  REFERRAL TO PHYSICAL THERAPY Reason for Therapy: Eval/Treat/Report    HYDROcodone-acetaminophen (NORCO) 5-325 MG Tab per tablet    CONSENT FOR OPIATE PRESCRIPTION   2. Chronic right hip pain  REFERRAL TO PHYSICAL THERAPY Reason for Therapy: Eval/Treat/Report    HYDROcodone-acetaminophen (NORCO) 5-325 MG Tab per tablet    CONSENT FOR OPIATE PRESCRIPTION          I have " placed the below orders and discussed them with an approved delegating provider. The MA is performing the below orders under the direction of Dr. Frank.

## 2018-09-04 NOTE — ASSESSMENT & PLAN NOTE
States things are slowly getting better. States that he is having days where pain is 5-6/10, but states that he will have a 8-9/10 pain in his right hip some days. States he has had a few days where he has been unable to get out of bed. States that he wants to see a physical therapist.  Patient states that overall he is slowly improving.  Patient is excited to get back to skiing, golf, states he is motivated to continue to improve.     Acute pain   This is a new problem.   Patient is complaining of pain x 8 week(s) located in his right hip.  Pain is intermittent, described as sharp, throbbing and a 8/10 on the pain scale.   Treatments tried include:Tylenol, NSAIDs, otc ointments, heat, ice, rest, movement    I have assessed the patient’s risk for abuse, dependency, and addiction using the validated Opioid Risk Tool.    Opioid Risk Score: 3    Interpretation of Opioid Risk Score   Score 0-3 = Low risk of abuse. Do UDS at least once per year.  Score 4-7 = Moderate risk of abuse. Do UDS 1-4 times per year.  Score 8+ = High risk of abuse. Refer to specialist.    I have conducted a physical exam and documented findings.  I certify that I have obtained and reviewed his medical history. I have also made a good guerrero effort to obtain applicable records from other providers who have treated the patient.  I have reviewed the patient's prescription history as maintained by the [place] Prescription Monitoring Program.     Given the above, I believe the benefits of controlled substance therapy outweigh the risks. The reasons for prescribing controlled substances include non-narcotic, oral analgesic alternatives have been inadequate for pain control. Accordingly, I have discussed the risk and benefits, treatment plan, and alternative therapies with the patient. Patient was advised that this medicine is intended for short term (no more than 14 days)/intermittent use only and not intended to be an ongoing prescription.

## 2018-09-05 ENCOUNTER — PHYSICAL THERAPY (OUTPATIENT)
Dept: PHYSICAL THERAPY | Facility: REHABILITATION | Age: 68
End: 2018-09-05
Attending: NURSE PRACTITIONER
Payer: MEDICARE

## 2018-09-05 DIAGNOSIS — G89.29 CHRONIC RIGHT HIP PAIN: ICD-10-CM

## 2018-09-05 DIAGNOSIS — Z96.641 STATUS POST RIGHT HIP REPLACEMENT: ICD-10-CM

## 2018-09-05 DIAGNOSIS — M25.551 CHRONIC RIGHT HIP PAIN: ICD-10-CM

## 2018-09-05 PROCEDURE — 97110 THERAPEUTIC EXERCISES: CPT

## 2018-09-05 PROCEDURE — G8979 MOBILITY GOAL STATUS: HCPCS | Mod: CI

## 2018-09-05 PROCEDURE — G8978 MOBILITY CURRENT STATUS: HCPCS | Mod: CL

## 2018-09-05 PROCEDURE — 97162 PT EVAL MOD COMPLEX 30 MIN: CPT

## 2018-09-05 PROCEDURE — 97140 MANUAL THERAPY 1/> REGIONS: CPT

## 2018-09-05 ASSESSMENT — ENCOUNTER SYMPTOMS
PAIN SCALE: 6
QUALITY: SHARP
QUALITY: ACHING
PAIN SCALE AT LOWEST: 4
ALLEVIATING FACTORS: PAIN MEDICATION
PAIN SCALE AT HIGHEST: 8
ALLEVIATING FACTORS: REST

## 2018-09-05 NOTE — OP THERAPY EVALUATION
Outpatient Physical Therapy  INITIAL EVALUATION    [facility]  [address], [address]  [place] [address]  Phone:  [phone]    Date of Evaluation: 2018    Patient: Vickey Turner  YOB: 1950  MRN: [id number]     Referring Provider: ANCELMO Rawls  [address]  [place] [address]   Referring Diagnosis Status post right hip replacement [Z96.641];Chronic right hip pain [M25.551, G89.29]     Time Calculation  Start time: 1430  Stop time: 1530 Time Calculation (min): 60 minutes     Physical Therapy Occurrence Codes    Date physical therapy care plan established or reviewed:  18   Date physical therapy treatment started:  18          Chief Complaint: Hip Problem  Visit Diagnoses     ICD-10-CM   1. Status post right hip replacement Z96.641   2. Chronic right hip pain M25.551    G89.29         Subjective:   History of Present Illness:     Date of surgery:  2018    Mechanism of injury:  Pt reports he had a R hip replacement on 18 and has had some sharp pains. He is unsure of how much activity he can do - he has had some days of significant pain that interfered with walking. He has changed from a walker to a cane. 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 during a shift. Over the last few days, he has reduced his activity level and he reports the pain level has gotten much better.  Pain:     Current pain ratin    At best pain ratin    At worst pain ratin    Quality:  Aching and sharp    Pain timing: In the middle of the night.    Relieving factors:  Rest and pain medication    Exacerbated by: Standing, walking.  Patient Goals:     Patient goals for therapy:  Decreased pain, increased strength and return to sport/leisure activities    Other patient goals:  Able to walk, lift weights, perform work duties, all  without pain.      Past Medical History:   Diagnosis Date   • Chronic back pain    • Diverticulitis    • GERD (gastroesophageal reflux disease)      Past Surgical History:   Procedure Laterality Date   • COLONOSCOPY - ENDO  11/17/2014    Performed by Moris Stanton D.O. at ENDOSCOPY [facility]     Social History   Substance Use Topics   • Smoking status: Never Smoker   • Smokeless tobacco: Never Used      Comment: +second-hand exposure   • Alcohol use No      Comment: quit 4  years      Family and Occupational History     Social History   • Marital status:      Spouse name: N/A   • Number of children: N/A   • Years of education: N/A       Objective     Observations     Additional Observation Details  Anterior hip incision.  Incision appears to be healing well; no visible signs of infection.  Mild swelling noted around incision area.    Lumbar Screen  Lumbar range of motion within normal limits with the following exceptions:No pain with forward bending, rotation, or side bending in standing.    Palpation     Additional Palpation Details  Mild TTP around incision.  Mild soft tissue restrictions noted.    Active Range of Motion   Left Hip   Flexion: 100 degrees     Right Hip   Flexion: 90 degrees with pain  Ambulation     Quality of Movement During Gait     Additional Quality of Movement During Gait Details  Pt using SPC for ambulation.  Mild lateral trunk sway.  Knee hyperextension thrust in stance phase, but pt able to correct with verbal cues.        Therapeutic Exercises (CPT 17730):     1. Heel slides, 2x10, HEP  // No discomfort.    2. SAQ, 2x7, HEP  // No discomfort.    3. Standing heel raises, double limb, 1x10, HEP  // No discomfort.    4. SL balance, fingertips on wall, 3x5 sec on R, HEP  // No discomfort.    5. Small amplitude crunches, 1x5 straight, diagonal, HEP  // Pt was doing these already, as well as full situps. PT advised pt to stop situps.    Therapeutic Treatments and Modalities:      1. Manual Therapy (CPT 59566), 1. STM soft tissues around incision., // Significantly less pain/tightness with AROM and walking.    Time-based treatments/modalities:  Manual therapy minutes (CPT 27380): 10 minutes  Therapeutic exercise minutes (CPT 80021): 20 minutes       Assessment, Response and Plan:   Impairments: abnormal ADL function, abnormal or restricted ROM, impaired functional mobility, impaired physical strength, lacks appropriate home exercise program, limited ADL's and pain with function    Assessment details:  The patient is a 68 y.o. male s/p R ART on 8/7/18 with current c/c of pain in his R groin and anterior hip area. Signs/symptoms consistent with post-surgical status. Pt has been overdoing his exercises at home and amount of walking since surgery, leading to soreness around his hip. We discussed scaling back his exercise routine and reviewed all exercises and modifications today; pt reported no discomfort and feeling better in his R hip at end of today's session. He will benefit from skilled PT to address remaining strength and mobility deficits with a program appropriate for his post-surgical status, to provide education on a self-managed HEP, and to facilitate a safe return to PLOF with minimal to no pain.    Barriers to therapy:  None  Prognosis: good    Goals:   Short Term Goals:   1. Independent with HEP.  2. Able to walk at least 150 ft safely on even surfaces to perform activities in and around home without limitation by hip pain, with proper gait mechanics, and without use of an assistive device.  3. Able to don shoes and socks independently without limitation by hip pain.  Short term goal time span:  2-4 weeks      Long Term Goals:    1. Independent with HEP and progressions/regressions of exercises and activities as necessary.  2. Able to walk at least 30 minutes safely on even surfaces to perform activities in and around home without limitation by hip pain, to participate in  community activities such as grocery shopping and walking for exercise, with proper gait mechanics, and without use of an assistive device.  3. 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    Long term goal time span:  6-8 weeks    Plan:   Therapy options:  Physical therapy treatment to continue  Planned therapy interventions:  E Stim Attended (CPT 77946), E Stim Unattended (CPT 04767), Functional Training, Self Care (CPT 43575), Gait Training (CPT 48491), Hot or Cold Pack Therapy (CPT 80849), Manual Therapy (CPT 89498), Neuromuscular Re-education (CPT 85406), TENS Applicaton (CPT 27493), Therapeutic Activities (CPT 46559), Therapeutic Exercise (CPT 40092) and Self Care ADL Training (CPT 14553)  Frequency:  2x week  Duration in weeks:  5  Duration in visits:  10  Discussed with:  Patient      Functional Limitation G-Codes and Severity Modifiers  PT Functional Assessment Tool Used: LEFS  PT Functional Assessment Score: 25  Lower Extremity Functional Scale Total: 25   Current: Mobility: Current (): CL   Goal: Mobility: Goal (): CI     Referring provider co-signature:  I have reviewed this plan of care and my co-signature certifies the need for services.  Certification Dates:   From 09/05/2018     To 10/10/2018    Physician Signature: ________________________________ Date: ______________

## 2018-09-10 ENCOUNTER — PHYSICAL THERAPY (OUTPATIENT)
Dept: PHYSICAL THERAPY | Facility: REHABILITATION | Age: 68
End: 2018-09-10
Attending: NURSE PRACTITIONER
Payer: MEDICARE

## 2018-09-10 DIAGNOSIS — M25.551 CHRONIC RIGHT HIP PAIN: ICD-10-CM

## 2018-09-10 DIAGNOSIS — G89.29 CHRONIC RIGHT HIP PAIN: ICD-10-CM

## 2018-09-10 DIAGNOSIS — Z96.641 STATUS POST RIGHT HIP REPLACEMENT: ICD-10-CM

## 2018-09-10 PROCEDURE — 97140 MANUAL THERAPY 1/> REGIONS: CPT

## 2018-09-10 PROCEDURE — 97110 THERAPEUTIC EXERCISES: CPT

## 2018-09-10 NOTE — OP THERAPY DAILY TREATMENT
Outpatient Physical Therapy  DAILY TREATMENT     [facility] [address], [address]  [place] [address]  Phone:  [phone]    Date: 09/10/2018    Patient: Vickey Turner  YOB: 1950  MRN: [id number]     Time Calculation  Start time: 1100  Stop time: 1130 Time Calculation (min): 30 minutes     Chief Complaint: Hip Problem    Visit #: 2    SUBJECTIVE:  Has felt better since last session; still has sharp shooting pain at times, but frequency now decreasing. Adherent with HEP modifications discussed last session.    OBJECTIVE:  Current objective measures:  Hip flx: 95 deg, no pain today.          Therapeutic Exercises (CPT 04081):     1. Heel sldies, Review, HEP    2. Ankle pumps, Review, HEP    3. SAQ, 1x7.  1.5# 2x10., HEP    4. Bridges, 1x10, HEP  // No pain.    5. Nu Step, 4 min, // No discomfort/pain.    Therapeutic Treatments and Modalities:     1. Manual Therapy (CPT 73374), 1. STM soft tissues around incision    Time-based treatments/modalities:  Manual therapy minutes (CPT 23819): 10 minutes  Therapeutic exercise minutes (CPT 83853): 20 minutes       Pain rating before treatment: 2  Pain rating after treatment: 2    ASSESSMENT:   Response to treatment: Tolerated all exercise progressions well without discomfort. Overall progressing well with less sharp pain and improved exercise tolerance. Now able to walk 20 ft without SPC with minimal limp/trunk sway.    PLAN/RECOMMENDATIONS:   Plan for treatment: therapy treatment to continue next visit.  Planned interventions for next visit: continue with current treatment.

## 2018-09-12 ENCOUNTER — PHYSICAL THERAPY (OUTPATIENT)
Dept: PHYSICAL THERAPY | Facility: REHABILITATION | Age: 68
End: 2018-09-12
Attending: NURSE PRACTITIONER
Payer: MEDICARE

## 2018-09-12 DIAGNOSIS — M25.551 CHRONIC RIGHT HIP PAIN: ICD-10-CM

## 2018-09-12 DIAGNOSIS — Z96.641 STATUS POST RIGHT HIP REPLACEMENT: ICD-10-CM

## 2018-09-12 DIAGNOSIS — G89.29 CHRONIC RIGHT HIP PAIN: ICD-10-CM

## 2018-09-12 PROCEDURE — 97110 THERAPEUTIC EXERCISES: CPT

## 2018-09-12 PROCEDURE — 97140 MANUAL THERAPY 1/> REGIONS: CPT

## 2018-09-12 NOTE — OP THERAPY DAILY TREATMENT
Outpatient Physical Therapy  DAILY TREATMENT     [facility] [address], [address]  [place] [address]  Phone:  [phone]    Date: 09/12/2018    Patient: Vickey Turner  YOB: 1950  MRN: [id number]     Time Calculation  Start time: 0930  Stop time: 1000 Time Calculation (min): 30 minutes     Chief Complaint: Hip Problem    Visit #: 3    SUBJECTIVE:  Has felt better since last session; still has sharp shooting pain at times, but frequency now decreasing. Adherent with HEP modifications discussed last session.    OBJECTIVE:  Current objective measures:  Hip flx: 95 deg, no pain today.          Therapeutic Exercises (CPT 47938):     1. Heel sldies, 1x10, HEP    2. Ankle pumps, Review, HEP    3. SAQ, 1.5# 1x20, HEP  // Progress weight next session.    4. Bridges, 1x10, HEP  // No pain.    5. Nu Step, 4 min, // No discomfort/pain.    6. Swiss ball rolls, hip/knee flxn, 1x10, // Slight sharp pain at R hip incision.    7. * S/Ling clams, 1x20, HEP    Therapeutic Treatments and Modalities:     1. Manual Therapy (CPT 50408), 1. STM soft tissues around incision    Time-based treatments/modalities:  Manual therapy minutes (CPT 18635): 10 minutes  Therapeutic exercise minutes (CPT 00390): 20 minutes       Pain rating before treatment: 2  Pain rating after treatment: 2    ASSESSMENT:   Response to treatment: Pt continues progressing well with less sharp pain, improved exercise tolerance, and improved tolerance walking without a cane, although he still uses the cane for longer distances. Reduction in activity has helped symptom level and progression of strengthening exercises.    Will benefit from continued progression of mobility and strength interventions for hip muscles    PLAN/RECOMMENDATIONS:   Plan for treatment: therapy treatment to continue next visit.  Planned interventions for next visit: continue with current treatment.

## 2018-09-17 ENCOUNTER — OFFICE VISIT (OUTPATIENT)
Dept: MEDICAL GROUP | Facility: PHYSICIAN GROUP | Age: 68
End: 2018-09-17
Payer: MEDICARE

## 2018-09-17 ENCOUNTER — APPOINTMENT (OUTPATIENT)
Dept: PHYSICAL THERAPY | Facility: REHABILITATION | Age: 68
End: 2018-09-17
Attending: NURSE PRACTITIONER
Payer: MEDICARE

## 2018-09-17 VITALS
HEIGHT: 69 IN | TEMPERATURE: 97.9 F | SYSTOLIC BLOOD PRESSURE: 136 MMHG | HEART RATE: 77 BPM | RESPIRATION RATE: 16 BRPM | BODY MASS INDEX: 21.33 KG/M2 | OXYGEN SATURATION: 97 % | WEIGHT: 144 LBS | DIASTOLIC BLOOD PRESSURE: 80 MMHG

## 2018-09-17 DIAGNOSIS — M25.551 CHRONIC RIGHT HIP PAIN: ICD-10-CM

## 2018-09-17 DIAGNOSIS — Z23 NEED FOR VACCINATION: ICD-10-CM

## 2018-09-17 DIAGNOSIS — Z96.641 STATUS POST RIGHT HIP REPLACEMENT: ICD-10-CM

## 2018-09-17 DIAGNOSIS — G89.29 CHRONIC RIGHT HIP PAIN: ICD-10-CM

## 2018-09-17 PROCEDURE — 90662 IIV NO PRSV INCREASED AG IM: CPT | Performed by: NURSE PRACTITIONER

## 2018-09-17 PROCEDURE — 99214 OFFICE O/P EST MOD 30 MIN: CPT | Mod: 25 | Performed by: NURSE PRACTITIONER

## 2018-09-17 PROCEDURE — G0008 ADMIN INFLUENZA VIRUS VAC: HCPCS | Performed by: NURSE PRACTITIONER

## 2018-09-17 RX ORDER — HYDROCODONE BITARTRATE AND ACETAMINOPHEN 5; 325 MG/1; MG/1
1-2 TABLET ORAL EVERY 4 HOURS PRN
Qty: 45 TAB | Refills: 0 | Status: SHIPPED | OUTPATIENT
Start: 2018-09-17 | End: 2018-10-18 | Stop reason: SDUPTHER

## 2018-09-17 ASSESSMENT — PAIN SCALES - GENERAL: PAINLEVEL: 7=MODERATE-SEVERE PAIN

## 2018-09-17 NOTE — PROGRESS NOTES
Chief Complaint   Patient presents with   • Hip Pain     feeling better; walking better        HISTORY OF PRESENT ILLNESS: Patient is a 68 y.o. male established patient who presents today to discuss hip pain.    Status post right hip replacement  States that pain continues to improve.  Patient states that most days his pain is 3-4 out of 10 but states that he has had some episodes of 8-9 out of 10 pain.  Patient states that pain is generally worse at night so he does take hydrocodone at night for sleep.  Patient is seeing physical therapy, saw orthopedics today who gave him a return to work in 2 weeks.  Patient states he is ready and excited to start getting back to normal activities.    Acute pain   This is a new problem.   Patient is complaining of pain x 10 week(s) located in his right hip.  Pain is intermittent, described as sharp, aching, shooting and a 8/10 on the pain scale.   Treatments tried include:Tylenol, NSAIDs, heat, ice, rest, movement    I have assessed the patient’s risk for abuse, dependency, and addiction using the validated Opioid Risk Tool.    Opioid Risk Score: 3    Interpretation of Opioid Risk Score   Score 0-3 = Low risk of abuse. Do UDS at least once per year.  Score 4-7 = Moderate risk of abuse. Do UDS 1-4 times per year.  Score 8+ = High risk of abuse. Refer to specialist.    I have conducted a physical exam and documented findings.  I certify that I have obtained and reviewed his medical history. I have also made a good guerrero effort to obtain applicable records from other providers who have treated the patient.  I have reviewed the patient's prescription history as maintained by the [place] Prescription Monitoring Program.     Given the above, I believe the benefits of controlled substance therapy outweigh the risks. The reasons for prescribing controlled substances include non-narcotic, oral analgesic alternatives have been inadequate for pain control. Accordingly, I have discussed the  risk and benefits, treatment plan, and alternative therapies with the patient. Patient was advised that this medicine is intended for short term (no more than 14 days)/intermittent use only and not intended to be an ongoing prescription.        Patient Active Problem List    Diagnosis Date Noted   • Status post right hip replacement 08/23/2018   • Chronic right hip pain 06/28/2018   • Migraine without aura and without status migrainosus, not intractable 04/24/2018   • Urinary frequency 06/29/2017   • Alcoholic liver disease, unspecified ([facility]) 06/29/2017   • Gastroesophageal reflux disease with esophagitis 08/05/2015   • Lumbar radiculopathy 05/15/2015   • DDD (degenerative disc disease), lumbar 05/15/2015   • Chronic back pain 05/15/2015   • Diverticulitis 11/19/2014   • Insomnia 11/19/2014   • Alcohol dependence in remission ([facility]) 11/19/2014   • Marijuana use 11/18/2014   • Seasonal allergies 10/29/2014       Allergies:Pantoprazole and Pantoprazole    Current Outpatient Prescriptions   Medication Sig Dispense Refill   • HYDROcodone-acetaminophen (NORCO) 5-325 MG Tab per tablet Take 1-2 Tabs by mouth every four hours as needed for up to 10 days. 45 Tab 0   • acetaminophen/caffeine/butalbital 325-40-50 mg (FIORICET) -40 MG Tab Take 1 Tab by mouth every four hours as needed for Headache. 30 Tab 0   • zolpidem (AMBIEN) 5 MG Tab Take 5 mg by mouth at bedtime as needed for Sleep.     • fluticasone (FLONASE) 50 MCG/ACT nasal spray Spray 1 Spray in nose every day. 16 g 0   • ranitidine (ZANTAC) 150 MG Tab Take 1 Tab by mouth 2 times a day. Indications: Gastroesophageal Reflux Disease 180 Tab 3   • vitamin D (CHOLECALCIFEROL) 1000 UNIT Tab Take 1,000 Units by mouth every day.       No current facility-administered medications for this visit.        Social History   Substance Use Topics   • Smoking status: Never Smoker   • Smokeless tobacco: Never Used      Comment: +second-hand exposure   • Alcohol use No       "Comment: quit 4  years        Family Status   Relation Status   • Mo    • Fa    • Bro Alive   • MGMo    • MGFa    • PGMo    • PGFa      Family History   Problem Relation Age of Onset   • Dementia Mother    • Heart Disease Father        Review of Systems:   Constitutional:  Negative for fever, chills, weight loss and malaise/fatigue.   HEENT:  Negative for ear pain, nosebleeds, congestion, sore throat and neck pain.    Eyes:  Negative for blurred vision.   Respiratory:  Negative for cough, sputum production, shortness of breath and wheezing.    Cardiovascular:  Negative for chest pain, palpitations, orthopnea and leg swelling.   Gastrointestinal:  Negative for heartburn, nausea, vomiting and abdominal pain.   Genitourinary:  Negative for dysuria, urgency and frequency.   Musculoskeletal:  Negative for myalgias, back pain and positive joint pain.   Skin:  Negative for rash and itching.   Neurological:  Negative for dizziness, tingling, tremors, sensory change, focal weakness and headaches.   Endo/Heme/Allergies:  Does not bruise/bleed easily.   Psychiatric/Behavioral:  Negative for depression, suicidal ideas and memory loss.  The patient is not nervous/anxious and does not have insomnia.    All other systems reviewed and are negative except as in HPI.    Exam:  Blood pressure 136/80, pulse 77, temperature 36.6 °C (97.9 °F), resp. rate 16, height 1.753 m (5' 9\"), weight 65.3 kg (144 lb), SpO2 97 %.  General:  Normal appearing. No distress.  Pulmonary:  Clear to ausculation.  Normal effort. No rales, ronchi, or wheezing.  Cardiovascular:  Regular rate and rhythm without murmur. Carotid and radial pulses are intact and equal bilaterally.  Abdomen:  Soft, nontender, nondistended. Normal bowel sounds. Liver and spleen are not palpable  Neurologic:  Grossly nonfocal  Lymph:  No cervical, supraclavicular or axillary lymph nodes are palpable  Skin:  Warm and dry.  No obvious " "lesions.  Musculoskeletal:  Normal gait. No extremity cyanosis, clubbing, or edema.  Right hip and thigh are tender to touch, patient has improved range of motion with pain, gait is improved.  Continues to use cane.  Psych:  Normal mood and affect. Alert and oriented x3. Judgment and insight is normal.      PLAN:    1. Status post right hip replacement  2. Chronic right hip pain  Patient continues to follow with orthopedics, physical therapy, states the pain is getting better decreased amount of pain medication provided today patient will follow-up in 1 month. Patient understands this prescription is a controlled substance which is potentially habit-forming and its use is regulated by the [employer]. We also discussed the new \"black box\" warning regarding the lethal combination of opioids and benzodiazepines. Refills are subject to terms of a controlled substance agreement and patient has an updated one on file. Any refill requires an office visit. This medicine can cause nausea, significant constipation, sedation, confusion and accidental overdose.  - HYDROcodone-acetaminophen (NORCO) 5-325 MG Tab per tablet; Take 1-2 Tabs by mouth every four hours as needed for up to 10 days.  Dispense: 45 Tab; Refill: 0    3. Need for vaccination  - INFLUENZA VACCINE, HIGH DOSE (65+ ONLY)    Follow-up in 1 month or sooner as needed. Patient is encouraged to be seen in the emergency room for chest pain, palpitations, shortness of breath, dizziness, severe abdominal pain or other concerning symptoms.    Please note that this dictation was created using voice recognition software. I have made every reasonable attempt to correct obvious errors, but I expect that there are errors of grammar and possibly content that I did not discover before finalizing the note.    Assessment/Plan:  1. Status post right hip replacement  HYDROcodone-acetaminophen (NORCO) 5-325 MG Tab per tablet   2. Chronic right hip pain  HYDROcodone-acetaminophen (NORCO) " 5-325 MG Tab per tablet   3. Need for vaccination  INFLUENZA VACCINE, HIGH DOSE (65+ ONLY)          I have placed the below orders and discussed them with an approved delegating provider. The MA is performing the below orders under the direction of Dr. Taylor.

## 2018-09-20 ENCOUNTER — HOSPITAL ENCOUNTER (OUTPATIENT)
Dept: LAB | Facility: MEDICAL CENTER | Age: 68
End: 2018-09-20
Attending: PHYSICIAN ASSISTANT
Payer: MEDICARE

## 2018-09-20 LAB — PSA SERPL-MCNC: 1.48 NG/ML (ref 0–4)

## 2018-09-20 PROCEDURE — 84153 ASSAY OF PSA TOTAL: CPT | Mod: GA

## 2018-09-20 PROCEDURE — 36415 COLL VENOUS BLD VENIPUNCTURE: CPT | Mod: GA

## 2018-09-21 ENCOUNTER — APPOINTMENT (OUTPATIENT)
Dept: PHYSICAL THERAPY | Facility: REHABILITATION | Age: 68
End: 2018-09-21
Attending: NURSE PRACTITIONER
Payer: MEDICARE

## 2018-09-23 DIAGNOSIS — G43.009 MIGRAINE WITHOUT AURA AND WITHOUT STATUS MIGRAINOSUS, NOT INTRACTABLE: ICD-10-CM

## 2018-09-24 ENCOUNTER — PHYSICAL THERAPY (OUTPATIENT)
Dept: PHYSICAL THERAPY | Facility: REHABILITATION | Age: 68
End: 2018-09-24
Attending: NURSE PRACTITIONER
Payer: MEDICARE

## 2018-09-24 DIAGNOSIS — Z96.641 STATUS POST RIGHT HIP REPLACEMENT: ICD-10-CM

## 2018-09-24 DIAGNOSIS — G89.29 CHRONIC RIGHT HIP PAIN: ICD-10-CM

## 2018-09-24 DIAGNOSIS — M25.551 CHRONIC RIGHT HIP PAIN: ICD-10-CM

## 2018-09-24 PROCEDURE — 97140 MANUAL THERAPY 1/> REGIONS: CPT

## 2018-09-24 PROCEDURE — 97110 THERAPEUTIC EXERCISES: CPT

## 2018-09-24 RX ORDER — BUTALBITAL, ACETAMINOPHEN AND CAFFEINE 50; 325; 40 MG/1; MG/1; MG/1
TABLET ORAL
Qty: 30 TAB | Refills: 0 | Status: SHIPPED
Start: 2018-09-24 | End: 2018-10-24

## 2018-09-24 NOTE — TELEPHONE ENCOUNTER
Was the patient seen in the last year in this department? Yes    Does patient have an active prescription for medications requested? Yes    Received Request Via: Leana BETANCURRASHEED     Age: 68  demographics   Data as of: 9/24/2018              NARCOTIC 421        SEDATIVE 320       STIMULANT 000       NARxSCORES can range from 000 to 999. The first two digits represent the composite percentile risk based on an overall analysis of prescription drug use. The third digit represents the number of active prescriptions. The distribution of scores in the population is such that approximately 75% fall below 200, 95% fall below 500 and 99% fall below 650. The information on this report is not warranted as accurate or complete. This report is based on the search criteria supplied and the data entered by the dispensing pharmacy. For more information about any prescription, please contact the dispensing pharmacy or the prescriber. NARxSCORES and Reports are intended to aid, not replace medical decision making. None of the information presented should be used as sole justification for providing or refusing to provide medications.       Rx Graph   [x] Narcotic [x] Sedative [x] Stimulant [x] Buprenorphine [x] Other    Created with Raphaël 2.1.0All Prescribers  Created with Raphaël 2.1.4Ljkfxqzx89/[id number] - Moo Lopes2 - Meliton De Paz3 - Michelle Morgan4 - Carmen Mccloud5 - Kobe Hawley6 - Roland Mackay7 - Castro Meraz8 - Alexis Hernandez  Morphine MgEq (MME)  Created with Raphaël [id number]  Created with Raphaël 2.1.0Elbjycjz85/[id number]  Lorazepam MgEq (LME)  Created with Raphaël 2.1.2333303  Created with Raphaël 2.1.2Cujbjtqo54/[id number]  *Per CDC guidance, the MME conversion factors prescribed or provided as part of the medication-assisted treatment for opioid use disorder should not be used to benchmark against dosage thresholds meant for opioids prescribed for pain. Buprenorphine  products have no agreed upon morphine equivalency, and as partial opioid agonists, are not expected to be associated with overdose risk in the same dose-dependent manner as doses for full agonist opioids. MME = morphine milligram equivalents. LME = Lorazepam milligram equivalents. mg = dose in milligrams.     Data Analysis   Narcotics (421)  2 months  6 months  1 year  2 years    Prescribers (narcotic, sedative) 3  51  4  41  5  37  8  39    Pharmacies (narcotic, sedative) 1  25  1  16  1  13  2  19    Morphine mg 1685  74  2105  74  2168  75  6448  86    Morphine overlap (1) 0  0  0  0  0  0  14  14            Sedatives (320)  2 months  6 months  1 year  2 years    Prescribers (narcotic, sedative) 3  51  4  41  5  37  8  39    Pharmacies (narcotic, sedative) 1  25  1  16  1  13  2  19    Sedative mg 15  20  45  39  90  50  105  53    Sedative overlap (1) 0  0  0  0  0  0  0  0            (1) Number of days for which a simliar type of medication was prescribed from different prescribers for use on the same day.         Summary   Total Prescriptions: 25   Total Prescribers: 8   Total Pharmacies: 2   Narcotics* (excluding buprenorphine):   Current Qty: 9   Current MME/day: 22.50   30 Day Avg MME/day: 22.00   Buprenorphine*   Current Qty: 0   Current mg/day: 0.00   30 Day Avg mg/day: 0.00   Sedatives*   Current Qty: 0   Current LME/day: 0.00   30 Day Avg LME/day: 0.22     Prescriptions Total Prescriptions: 25 Private Pay: 1   Fill Date PT Written Drug Qty Days Rx # Prescriber Pharmacy Refill Daily Dose * Pymt Type    09/17/2018 2  09/17/2018 HYDROCODONE-ACETAMIN 5-325 MG 45 10 [id number] RASHAD KATHARINE [facility] 0 22.50 [place] [place]   09/04/2018 2  09/04/2018 HYDROCODONE-ACETAMIN 5-325 MG 60 10 [id number] RASHAD KATHARINE [facility] 0 30.00 [place] [place]   08/20/2018 2  08/20/2018 HYDROCODONE-ACETAMIN 5-325 MG 60 10 [id number] RASHAD CHOI [facility] 0 30.00 [place] [place]   08/13/2018 2  08/13/2018 HYDROCODONE-ACETAMIN  5-325 MG 40 5 [id number] RE COV [facility] 0 40.00 [place] [place]   08/08/2018 2  08/08/2018 OXYCODONE HCL 5 MG TABLET 60 5 [id number] RE COV [facility] 0 90.00 [place] [place]   08/08/2018 2  08/08/2018 DIAZEPAM 5 MG TABLET 30 30 [id number] RE COV [facility] 0 0.50 [place] [place]   07/25/2018 2  07/24/2018 HYDROCODONE-ACETAMIN 5-325 MG 42 14 [id number] JA KATHARINE [facility] 0 15.00 [place]   07/17/2018 2  07/17/2018 HYDROCODONE-ACETAMIN 5-325 MG 42 7 [id number] KE ROBERT [facility] 0 30.00 [place]   07/03/2018 2  07/03/2018 HYDROCODONE-ACETAMIN 5-325 MG 42 7 [id number] JA KATHARINE [facility] 0 30.00 [place]   06/20/2018 2  02/26/2018 ZOLPIDEM TARTRATE 10 MG TABLET 30 30 [id number] JA KATHARINE [facility] 2 0.50 [place] [place]   04/17/2018 2  02/26/2018 ZOLPIDEM TARTRATE 10 MG TABLET 30 30 [id number] JA KATHARINE [facility] 1 0.50 [place]   02/27/2018 2  02/26/2018 ZOLPIDEM TARTRATE 10 MG TABLET 30 30 [id number] JA KATHARINE [facility] 0 0.50 [place] [place]   01/08/2018 2  08/25/2017 ZOLPIDEM TARTRATE 10 MG TABLET 30 30 [id number] JA KATHARINE [facility] 2 0.50 [place] [place]   12/04/2017 2  12/04/2017 VIRTUSSIN AC LIQUID 90 3 [id number] AA LM [facility] 0 9.00 [place]   11/27/2017 2  11/27/2017 VIRTUSSIN AC LIQUID 120 2 [id number] JA KATHARINE [facility] 0 18.00 [place]   11/07/2017 2  08/25/2017 ZOLPIDEM TARTRATE 10 MG TABLET 30 30 [id number] JA KATHARINE [facility] 1 0.50 [place]   08/25/2017 2  08/25/2017 ZOLPIDEM TARTRATE 10 MG TABLET 30 30 [id number] JA KATHARINE [facility] 0 0.50 [place]   06/09/2017 1  06/09/2017 HYDROCODONE-CHLORPHEN ER SUSP 140 14 [id number] DA BENITEZ LONGS  0 20.00 [place]ME [place]   06/07/2017 2  05/08/2017 HYDROCODONE-ACETAMIN  MG 20 20 [id number] AN FEL [facility] 0 10.00 [place]   05/09/2017 2  05/08/2017 HYDROCODONE-ACETAMIN  MG 35 35 [id number] AN FEL [facility] 0 10.00 [place]   03/24/2017 2  03/24/2017 HYDROCODONE-ACETAMIN  MG 90 30 [id number] NI KATZ [facility] 0 30.00 [place]   01/10/2017 1  01/10/2017 HYDROCODONE-ACETAMIN  MG 90 30  [id number] NI KATZ LONGS  0 30.00 [place]   12/08/2016 1  11/08/2016 HYDROCODONE-ACETAMIN  MG 45 15 [id number] NI KATZ LONGS  0 30.00 [place]   11/09/2016 1  11/09/2016 HYDROCODONE-ACETAMIN  MG 90 30 [id number] NI KATZ LONGS  0 30.00 [place]   10/05/2016 1  10/05/2016 HYDROCODONE-ACETAMIN  MG 30 10 [id number] NI KATZ LONGS  0 30.00 MME [place]     Providers Total Providers: 8   Name Address [facility] CHATA RASCON [address] [facility] [place] [address] [id number]   DELFIN HONG [address] [place] [address] [id number]   MAIKOL NUNEZ [address] [place] [address] [id number]   TANNER JUNE [address] [place] [address] [id number]   NAINA DE LEON [address] [place] [address] [id number]   ZEINA  [facility] [address] [place] [address] [id number]   STANLEY SOTO [address] [place] [address] [id number]   MORRIS KERN [address] [place] [address] [id number]     Pharmacies Total Pharmacies: 2   Name Address [facility]   [facility] [place] [address] [place] [address] [id number]   [facility]  [address] [place] [address] [id number]

## 2018-09-24 NOTE — OP THERAPY DAILY TREATMENT
Outpatient Physical Therapy  DAILY TREATMENT     [facility] Therapy [address] [facility], [address]  [place] [address]  Phone:  [phone]    Date: 09/24/2018    Patient: Vickey Turner  YOB: 1950  MRN: [id number]     Time Calculation  Start time: 1100  Stop time: 1130 Time Calculation (min): 30 minutes     Chief Complaint: Hip Problem    Visit #: 4    SUBJECTIVE:  Feels he continues making good progress, although he still reports some pain in his front R hip. He feels the cold is also contributing to some pain.    OBJECTIVE:  Current objective measures:  Hip flx: 95 deg, no pain today.          Therapeutic Exercises (CPT 23360):     1. Heel sldies, 1x10, HEP    2. Ankle pumps, Review, HEP    3. SAQ, 1.5# 1x20, Not done 9/24/18    4. Bridges, 1x10, HEP  // No pain.    5. Nu Step, 6 min, // No discomfort/pain.    6. Swiss ball rolls, hip/knee flxn, 1x10, // Slight sharp pain at R hip incision.    7. * S/Ling clams, Review, HEP    8. Shuttle press, 4 bands, 2x15, // No discomfort.    Therapeutic Treatments and Modalities:     1. Manual Therapy (CPT 82154), 1. STM soft tissues around incision, 2. Gentle harmonics - rolling, flx/ext c knee support, 60 sec ea.    Time-based treatments/modalities:  Manual therapy minutes (CPT 17262): 15 minutes  Therapeutic exercise minutes (CPT 42040): 15 minutes       Pain rating before treatment: 2  Pain rating after treatment: 2    ASSESSMENT:   Response to treatment: Pt continues progressing well, although he still uses the cane for longer distances due to occasional sharp hip pain. He is now able to tolerate more exercise intensity.     Will benefit from continued progression of mobility and strength interventions for hip muscles    PLAN/RECOMMENDATIONS:   Plan for treatment: therapy treatment to continue next visit.  Planned interventions for next visit: continue with current treatment.

## 2018-09-26 ENCOUNTER — PHYSICAL THERAPY (OUTPATIENT)
Dept: PHYSICAL THERAPY | Facility: REHABILITATION | Age: 68
End: 2018-09-26
Attending: NURSE PRACTITIONER
Payer: MEDICARE

## 2018-09-26 DIAGNOSIS — M25.551 CHRONIC RIGHT HIP PAIN: ICD-10-CM

## 2018-09-26 DIAGNOSIS — Z96.641 STATUS POST RIGHT HIP REPLACEMENT: ICD-10-CM

## 2018-09-26 DIAGNOSIS — G89.29 CHRONIC RIGHT HIP PAIN: ICD-10-CM

## 2018-09-26 PROCEDURE — 97110 THERAPEUTIC EXERCISES: CPT

## 2018-09-26 PROCEDURE — 97140 MANUAL THERAPY 1/> REGIONS: CPT

## 2018-09-26 NOTE — OP THERAPY DAILY TREATMENT
Outpatient Physical Therapy  DAILY TREATMENT     [facility] Out[facility] Physical Therapy [address], [address]  [place] [address]  Phone:  [phone]    Date: 09/26/2018    Patient: Vickey Turner  YOB: 1950  MRN: [id number]     Time Calculation  Start time: 1000  Stop time: 1035 Time Calculation (min): 35 minutes     Chief Complaint: Hip Problem    Visit #: 5    SUBJECTIVE:  Feels he continues making good progress. He did a lot of work around his house yesterday and had very sharp zac-medial groin pain that interfered with walking. He heated and iced his hip and it is much better this morning. He feels this recovery time is much faster than it would have been a couple weeks ago.    OBJECTIVE:  Current objective measures:  Hip flx: 95 deg, no pain today.          Therapeutic Exercises (CPT 62587):     1. Bosu Balance, Ant/post, lateral, EO/EC    2. Gait c dumbbells, 3# ea hand, 2x60 ft    3. SAQ, 1.5# 1x20, Not done 9/26/18    4. Bridges, 1x10, HEP  // No pain.    5. Nu Step, 6 min, Not done 9/26/18    6. Swiss ball rolls, hip/knee flxn, 1x10    7. * S/Ling clams, Review, HEP    8. Shuttle press, 4 bands, 2x15, Not done 9/26/18    9. Wall squats, ball wall squats, 1x10, 2x10, HEP - wall squats    Therapeutic Treatments and Modalities:     1. Manual Therapy (CPT 88698), 1. STM soft tissues around incision, 2. Gentle harmonics - rolling., // Difficulty relaxing., 3. Supine Focused STM along anterior ITB; * posterior ITB c knee articulation and hip/knee flxn., // Decr tissue tension, decr tightness/discomfort standing, walking, c wall squats.    Time-based treatments/modalities:  Manual therapy minutes (CPT 35331): 15 minutes  Therapeutic exercise minutes (CPT 29862): 20 minutes       Pain rating before treatment: 2-3  Pain rating after treatment: 0-1    ASSESSMENT:   Response to treatment: Good response to manual therapy with significantly less tissue tension and near abolition of hip  pain/tightness with walking and exercises. Soft tissue restrictions around incision and along ITB likely contributed to symptoms yesterday. No discomfort with wall squats or walking carrying dumbbells today.    Will benefit from continued progression of mobility and strength interventions for hip muscles    PLAN/RECOMMENDATIONS:   Plan for treatment: therapy treatment to continue next visit.  Planned interventions for next visit: continue with current treatment.  * Review HEP and progressions for DC.

## 2018-10-01 ENCOUNTER — PHYSICAL THERAPY (OUTPATIENT)
Dept: PHYSICAL THERAPY | Facility: REHABILITATION | Age: 68
End: 2018-10-01
Attending: NURSE PRACTITIONER
Payer: MEDICARE

## 2018-10-01 DIAGNOSIS — G89.29 CHRONIC RIGHT HIP PAIN: ICD-10-CM

## 2018-10-01 DIAGNOSIS — Z96.641 STATUS POST RIGHT HIP REPLACEMENT: ICD-10-CM

## 2018-10-01 DIAGNOSIS — M25.551 CHRONIC RIGHT HIP PAIN: ICD-10-CM

## 2018-10-01 PROCEDURE — 97140 MANUAL THERAPY 1/> REGIONS: CPT

## 2018-10-01 PROCEDURE — 97110 THERAPEUTIC EXERCISES: CPT

## 2018-10-01 PROCEDURE — G8979 MOBILITY GOAL STATUS: HCPCS | Mod: CI

## 2018-10-01 PROCEDURE — G8980 MOBILITY D/C STATUS: HCPCS | Mod: CJ

## 2018-10-01 NOTE — OP THERAPY DISCHARGE SUMMARY
Outpatient Physical Therapy  DISCHARGE SUMMARY NOTE      [facility] [place]  [address]  [place] [address]  Phone:  [phone]    Date of Visit: 10/01/2018    Patient: Vickey Turner  YOB: 1950  MRN: [id number]     Referring Provider: ANCELMO Rawls  [address]  [place] [address]   Referring Diagnosis Status post right hip replacement [Z96.641];Chronic right hip pain [M25.551, G89.29]     Physical Therapy Occurrence Codes    Date of onset of impairment:  8/7/18   Date physical therapy care plan established or reviewed:  9/5/18   Date physical therapy treatment started:  9/5/18          Functional Limitation G-Codes and Severity Modifiers  PT Functional Assessment Tool Used: LEFS  PT Functional Assessment Score: 76.25  Lower Extremity Functional Scale Total: 76.25   Goal: Mobility: Goal (): CI   Discharge: Mobility: Discharge (): CJ       Your patient is being discharged from Physical Therapy with the following comments:   · Goals met    Comments:  Overall, the pt has made very good progress with PT and no longer reports R hip pain. He is now able to do his HEP without discomfort and was able to work 5 hours with fatigue, but no pain the following day. He states he feels ready to return to his full work shift of 8 hours and is comfortable with DC from PT today.      Recommendations:  DC from PT.    Jose Kligore, PT, DPT, OCS    Date: 10/1/2018

## 2018-10-01 NOTE — OP THERAPY DAILY TREATMENT
"  Outpatient Physical Therapy  DAILY TREATMENT     [facility] Outpatient Physical Therapy [address], [address]  [place] [address]  Phone:  [phone]    Date: 10/01/2018    Patient: Vickey Turner  YOB: 1950  MRN: [id number]     Time Calculation  Start time: 0900  Stop time: 0930 Time Calculation (min): 30 minutes     Chief Complaint: Hip Problem    Visit #: 6    SUBJECTIVE:  Pt was able to work partial shift without significant discomfort. He feel ready to return to his full work shift.    OBJECTIVE:  Current objective measures:  R Hip flx: 110 deg, no pain, \"just tight at end range.  R Hip, supine 90/90, ER: 45 deg.  IR: 30 deg.  PT Functional Assessment Tool Used: LEFS  PT Functional Assessment Score: 76.25  Lower Extremity Functional Scale Total: 76.25       Therapeutic Exercises (CPT 88695):     1. SL Balance    2. Heel raises    4. Bridges, 1x10 review, HEP    5. Nu Step, 6 min    6. Swiss ball rolls, hip/knee flxn, 1x10    7. * S/Ling clams, Review, HEP    9. Wall squats, 1x10, HEP     10. Heel slide, BKFO, 1x10 ea, review, HEP    11. SKTC, 1x1 min , HEP    12. HS stretch, 1x1 min, HEP    Therapeutic Treatments and Modalities:     1. Manual Therapy (CPT 54335), 1. STM soft tissues around incision, 2. Gentle harmonics - rolling., // Difficulty relaxing., 3. Supine Focused STM along anterior ITB; * posterior ITB c knee articulation and hip/knee flxn., // Decr tissue tension, decr tightness/discomfort standing, walking, c wall squats.    Time-based treatments/modalities:  Manual therapy minutes (CPT 68662): 10 minutes  Therapeutic exercise minutes (CPT 54572): 20 minutes       Pain rating before treatment: 0  Pain rating after treatment: 0    ASSESSMENT:   Response to treatment: Good response to manual therapy with significantly less tissue tension and decreased tightness across hip. Overall, the pt has made very good progress with PT and now demonstrates good technique with his HEP " without discomfort.      PLAN/RECOMMENDATIONS:   Plan for treatment: discharge patient due to accomplished goals.

## 2018-10-02 DIAGNOSIS — J06.9 ACUTE URI: ICD-10-CM

## 2018-10-02 RX ORDER — FLUTICASONE PROPIONATE 50 MCG
SPRAY, SUSPENSION (ML) NASAL
Qty: 1 BOTTLE | Refills: 6 | Status: SHIPPED | OUTPATIENT
Start: 2018-10-02 | End: 2019-01-02 | Stop reason: SDUPTHER

## 2018-10-18 ENCOUNTER — OFFICE VISIT (OUTPATIENT)
Dept: MEDICAL GROUP | Facility: PHYSICIAN GROUP | Age: 68
End: 2018-10-18
Payer: MEDICARE

## 2018-10-18 VITALS
OXYGEN SATURATION: 95 % | DIASTOLIC BLOOD PRESSURE: 80 MMHG | HEIGHT: 69 IN | WEIGHT: 144 LBS | RESPIRATION RATE: 16 BRPM | SYSTOLIC BLOOD PRESSURE: 126 MMHG | HEART RATE: 80 BPM | BODY MASS INDEX: 21.33 KG/M2 | TEMPERATURE: 97.8 F

## 2018-10-18 DIAGNOSIS — M25.551 CHRONIC RIGHT HIP PAIN: ICD-10-CM

## 2018-10-18 DIAGNOSIS — G89.29 CHRONIC RIGHT HIP PAIN: ICD-10-CM

## 2018-10-18 DIAGNOSIS — Z23 NEED FOR VACCINATION: ICD-10-CM

## 2018-10-18 DIAGNOSIS — Z96.641 STATUS POST RIGHT HIP REPLACEMENT: ICD-10-CM

## 2018-10-18 PROCEDURE — 99214 OFFICE O/P EST MOD 30 MIN: CPT | Mod: 25 | Performed by: NURSE PRACTITIONER

## 2018-10-18 PROCEDURE — 90732 PPSV23 VACC 2 YRS+ SUBQ/IM: CPT | Performed by: NURSE PRACTITIONER

## 2018-10-18 PROCEDURE — G0009 ADMIN PNEUMOCOCCAL VACCINE: HCPCS | Performed by: NURSE PRACTITIONER

## 2018-10-18 RX ORDER — HYDROCODONE BITARTRATE AND ACETAMINOPHEN 5; 325 MG/1; MG/1
1-2 TABLET ORAL EVERY 4 HOURS PRN
Qty: 45 TAB | Refills: 0 | Status: SHIPPED | OUTPATIENT
Start: 2018-10-18 | End: 2018-10-28

## 2018-10-18 ASSESSMENT — PAIN SCALES - GENERAL: PAINLEVEL: 4=SLIGHT-MODERATE PAIN

## 2018-10-18 NOTE — ASSESSMENT & PLAN NOTE
Chronic in nature.  Medication use has significantly decreased.  Patient states that he is using it on work days in the morning and sometimes before bed.  States that the cold has made his pain more severe states that when it is really cold outside it will be 8-9 out of 10.  States that generally pain is 2-3 out of 10.  Patient states that he continues to have aching.  Patient has completed physical therapy is following with Dr. De Paz regarding this issue.  Patient continues to take pain medication as needed, this does generally work well.  Patient will begin to wean off the medication entirely next follow-up will be in January at which point we will discuss whether or not he needs further refills of medication.    Chronic pain recheck for: hydrocodone  Last dose of controlled substance: yesterday  Chronic pain treated with hydrocodone taken 1-2 times a day    He  reports that he does not drink alcohol.  He  reports that he does not use drugs.    Interval history:   Any major change in health since last appointment? Yes Significant improvement    Consequences of Chronic Opiate therapy:  (5 A's)  Analgesia: Compared to no treatment or prior treatment, pain is currently improved  Activity: improved  Adverse Events:FABY@ denies constipation, dry mouth, itchy skin, nausea and sedation  Aberrant Behaviors: He reports he is taking medication as prescribed and is not veering from agreed treatment regimen or provider recommendations. There have been no inappropriate refills or lost/stolen meds reported.  Affect/Mood: Pain is impacting patient's mood.  Patient denies depression/anxiety.    Nonnarcotic treatments that are being used: Tylenol, NSAIDs/GOMEZ-2, physical therapy, topical agents, ice and heat.     Last imaging: last week or so with Dr. De Paz.    Opioid Risk Score: 3    Interpretation of Opioid Risk Score   Score 0-3 = Low risk of abuse. Do UDS at least once per year.  Score 4-7 = Moderate risk of abuse. Do UDS 1-4  times per year.  Score 8+ = High risk of abuse. Refer to specialist.    No order of CONTROLLED SUBSTANCE TREATMENT AGREEMENT is found.     UDS Summary     Patient has no health maintenance due at this time        Most recent UDS reviewed today and is consistent with prescribed medications.     I have reviewed the medical records, the Prescription Monitoring Program and I have determined that controlled substance treatment is medically indicated.

## 2018-10-18 NOTE — PROGRESS NOTES
Chief Complaint   Patient presents with   • Hip Pain     hurts more with cold    • Knee Pain     px can get to a 8        HISTORY OF PRESENT ILLNESS: Patient is a 68 y.o. male established patient who presents today to discuss hip replacement.    Status post right hip replacement  Chronic in nature.  Medication use has significantly decreased.  Patient states that he is using it on work days in the morning and sometimes before bed.  States that the cold has made his pain more severe states that when it is really cold outside it will be 8-9 out of 10.  States that generally pain is 2-3 out of 10.  Patient states that he continues to have aching.  Patient has completed physical therapy is following with Dr. De Paz regarding this issue.  Patient continues to take pain medication as needed, this does generally work well.  Patient will begin to wean off the medication entirely next follow-up will be in January at which point we will discuss whether or not he needs further refills of medication.    Chronic pain recheck for: hydrocodone  Last dose of controlled substance: yesterday  Chronic pain treated with hydrocodone taken 1-2 times a day    He  reports that he does not drink alcohol.  He  reports that he does not use drugs.    Interval history:   Any major change in health since last appointment? Yes Significant improvement    Consequences of Chronic Opiate therapy:  (5 A's)  Analgesia: Compared to no treatment or prior treatment, pain is currently improved  Activity: improved  Adverse Events:CAPHE@ denies constipation, dry mouth, itchy skin, nausea and sedation  Aberrant Behaviors: He reports he is taking medication as prescribed and is not veering from agreed treatment regimen or provider recommendations. There have been no inappropriate refills or lost/stolen meds reported.  Affect/Mood: Pain is impacting patient's mood.  Patient denies depression/anxiety.    Nonnarcotic treatments that are being used: Tylenol,  NSAIDs/GOMEZ-2, physical therapy, topical agents, ice and heat.     Last imaging: last week or so with Dr. De Paz.    Opioid Risk Score: 3    Interpretation of Opioid Risk Score   Score 0-3 = Low risk of abuse. Do UDS at least once per year.  Score 4-7 = Moderate risk of abuse. Do UDS 1-4 times per year.  Score 8+ = High risk of abuse. Refer to specialist.    No order of CONTROLLED SUBSTANCE TREATMENT AGREEMENT is found.     UDS Summary     Patient has no health maintenance due at this time        Most recent UDS reviewed today and is consistent with prescribed medications.     I have reviewed the medical records, the Prescription Monitoring Program and I have determined that controlled substance treatment is medically indicated.         Patient Active Problem List    Diagnosis Date Noted   • Status post right hip replacement 08/23/2018   • Chronic right hip pain 06/28/2018   • Migraine without aura and without status migrainosus, not intractable 04/24/2018   • Urinary frequency 06/29/2017   • Alcoholic liver disease, unspecified (HCC) 06/29/2017   • Gastroesophageal reflux disease with esophagitis 08/05/2015   • Lumbar radiculopathy 05/15/2015   • DDD (degenerative disc disease), lumbar 05/15/2015   • Chronic back pain 05/15/2015   • Diverticulitis 11/19/2014   • Insomnia 11/19/2014   • Alcohol dependence in remission ([facility]) 11/19/2014   • Marijuana use 11/18/2014   • Seasonal allergies 10/29/2014       Allergies:Pantoprazole and Pantoprazole    Current Outpatient Prescriptions   Medication Sig Dispense Refill   • HYDROcodone-acetaminophen (NORCO) 5-325 MG Tab per tablet Take 1-2 Tabs by mouth every four hours as needed for up to 10 days. 45 Tab 0   • fluticasone (FLONASE) 50 MCG/ACT nasal spray USE ONE SPRAY(S) IN EACH NOSTRIL ONCE DAILY 1 Bottle 6   • acetaminophen/caffeine/butalbital 325-40-50 mg (FIORICET) -40 MG Tab TAKE 1 TABLET BY MOUTH EVERY 4 HOURS AS NEEDED FOR HEADACHE 30 Tab 0   • zolpidem (AMBIEN) 5  MG Tab Take 5 mg by mouth at bedtime as needed for Sleep.     • ranitidine (ZANTAC) 150 MG Tab Take 1 Tab by mouth 2 times a day. Indications: Gastroesophageal Reflux Disease 180 Tab 3   • vitamin D (CHOLECALCIFEROL) 1000 UNIT Tab Take 1,000 Units by mouth every day.       No current facility-administered medications for this visit.        Social History   Substance Use Topics   • Smoking status: Never Smoker   • Smokeless tobacco: Never Used      Comment: +second-hand exposure   • Alcohol use No      Comment: quit 4  years        Family Status   Relation Status   • Mo    • Fa    • Bro Alive   • MGMo    • MGFa    • PGMo    • PGFa      Family History   Problem Relation Age of Onset   • Dementia Mother    • Heart Disease Father        Review of Systems:   Constitutional:  Negative for fever, chills, weight loss and malaise/fatigue.   HEENT:  Negative for ear pain, nosebleeds, congestion, sore throat and neck pain.    Eyes:  Negative for blurred vision.   Respiratory:  Negative for cough, sputum production, shortness of breath and wheezing.    Cardiovascular:  Negative for chest pain, palpitations, orthopnea and leg swelling.   Gastrointestinal:  Negative for heartburn, nausea, vomiting and abdominal pain.   Genitourinary:  Negative for dysuria, urgency and frequency.   Musculoskeletal:  Negative for myalgias, back pain and joint pain.   Skin:  Negative for rash and itching.   Neurological:  Negative for dizziness, tingling, tremors, sensory change, focal weakness and headaches.   Endo/Heme/Allergies:  Does not bruise/bleed easily.   Psychiatric/Behavioral:  Negative for depression, suicidal ideas and memory loss.  The patient is not nervous/anxious and does not have insomnia.    All other systems reviewed and are negative except as in HPI.    Exam:  Blood pressure 126/80, pulse 80, temperature 36.6 °C (97.8 °F), temperature source Temporal, resp. rate 16, height 1.753 m  "(5' 9\"), weight 65.3 kg (144 lb), SpO2 95 %.  General:  Normal appearing. No distress.  Pulmonary:  Clear to ausculation.  Normal effort. No rales, ronchi, or wheezing.  Cardiovascular:  Regular rate and rhythm without murmur. Carotid and radial pulses are intact and equal bilaterally.  Neurologic:  Grossly nonfocal  Lymph:  No cervical, supraclavicular or axillary lymph nodes are palpable  Skin:  Warm and dry.  No obvious lesions.  Musculoskeletal:  Normal gait. No extremity cyanosis, clubbing, or edema. Hip: Tenderness to palpation on multiple places throughout joint.Some restriction of flexion, extension, abduction and adduction ROM.  Psych:  Normal mood and affect. Alert and oriented x3. Judgment and insight is normal.      PLAN:    1. Status post right hip replacement  2. Chronic right hip pain  Continue current medication.  - HYDROcodone-acetaminophen (NORCO) 5-325 MG Tab per tablet; Take 1-2 Tabs by mouth every four hours as needed for up to 10 days.  Dispense: 45 Tab; Refill: 0    3. Need for vaccination  - Pneumococal Polysaccharide Vaccine 23-Valent =>1YO SQ/IM    Follow-up in 3 months. Patient is encouraged to be seen in the emergency room for chest pain, palpitations, shortness of breath, dizziness, severe abdominal pain or other concerning symptoms.    Please note that this dictation was created using voice recognition software. I have made every reasonable attempt to correct obvious errors, but I expect that there are errors of grammar and possibly content that I did not discover before finalizing the note.    Assessment/Plan:  1. Status post right hip replacement  HYDROcodone-acetaminophen (NORCO) 5-325 MG Tab per tablet   2. Chronic right hip pain  HYDROcodone-acetaminophen (NORCO) 5-325 MG Tab per tablet   3. Need for vaccination  Pneumococal Polysaccharide Vaccine 23-Valent =>1YO SQ/IM          I have placed the below orders and discussed them with an approved delegating provider. The MA is " performing the below orders under the direction of Dr. Frank.

## 2018-12-13 DIAGNOSIS — F51.01 PRIMARY INSOMNIA: ICD-10-CM

## 2018-12-13 RX ORDER — ZOLPIDEM TARTRATE 10 MG/1
TABLET ORAL
Qty: 30 TAB | Refills: 3 | Status: SHIPPED | OUTPATIENT
Start: 2018-12-13 | End: 2019-01-12

## 2019-01-02 ENCOUNTER — OFFICE VISIT (OUTPATIENT)
Dept: MEDICAL GROUP | Facility: PHYSICIAN GROUP | Age: 69
End: 2019-01-02
Payer: MEDICARE

## 2019-01-02 VITALS
HEIGHT: 69 IN | TEMPERATURE: 97.8 F | OXYGEN SATURATION: 95 % | DIASTOLIC BLOOD PRESSURE: 80 MMHG | WEIGHT: 150 LBS | SYSTOLIC BLOOD PRESSURE: 134 MMHG | HEART RATE: 82 BPM | RESPIRATION RATE: 16 BRPM | BODY MASS INDEX: 22.22 KG/M2

## 2019-01-02 DIAGNOSIS — J06.9 ACUTE URI: ICD-10-CM

## 2019-01-02 DIAGNOSIS — E78.5 HYPERLIPIDEMIA, UNSPECIFIED HYPERLIPIDEMIA TYPE: ICD-10-CM

## 2019-01-02 DIAGNOSIS — F10.21 ALCOHOL DEPENDENCE IN REMISSION (HCC): ICD-10-CM

## 2019-01-02 DIAGNOSIS — Z79.891 CHRONIC USE OF OPIATE DRUGS THERAPEUTIC PURPOSES: ICD-10-CM

## 2019-01-02 DIAGNOSIS — R53.83 FATIGUE, UNSPECIFIED TYPE: ICD-10-CM

## 2019-01-02 DIAGNOSIS — K21.00 GASTROESOPHAGEAL REFLUX DISEASE WITH ESOPHAGITIS: ICD-10-CM

## 2019-01-02 DIAGNOSIS — J30.2 SEASONAL ALLERGIES: ICD-10-CM

## 2019-01-02 DIAGNOSIS — K70.9 ALCOHOLIC LIVER DISEASE, UNSPECIFIED (HCC): ICD-10-CM

## 2019-01-02 DIAGNOSIS — Z96.641 STATUS POST RIGHT HIP REPLACEMENT: ICD-10-CM

## 2019-01-02 PROCEDURE — 99214 OFFICE O/P EST MOD 30 MIN: CPT | Performed by: NURSE PRACTITIONER

## 2019-01-02 RX ORDER — FLUTICASONE PROPIONATE 50 MCG
1 SPRAY, SUSPENSION (ML) NASAL DAILY
Qty: 1 BOTTLE | Refills: 6 | Status: SHIPPED | OUTPATIENT
Start: 2019-01-02 | End: 2020-05-11

## 2019-01-02 RX ORDER — HYDROCODONE BITARTRATE AND ACETAMINOPHEN 5; 325 MG/1; MG/1
1-2 TABLET ORAL EVERY 4 HOURS PRN
COMMUNITY
End: 2019-01-02 | Stop reason: SDUPTHER

## 2019-01-02 RX ORDER — AMOXICILLIN 500 MG/1
500 CAPSULE ORAL 3 TIMES DAILY
COMMUNITY
End: 2019-01-02

## 2019-01-02 RX ORDER — HYDROCODONE BITARTRATE AND ACETAMINOPHEN 5; 325 MG/1; MG/1
1-2 TABLET ORAL EVERY 4 HOURS PRN
Qty: 45 TAB | Refills: 0 | Status: SHIPPED | OUTPATIENT
Start: 2019-01-02 | End: 2019-07-02 | Stop reason: SDUPTHER

## 2019-01-02 ASSESSMENT — PATIENT HEALTH QUESTIONNAIRE - PHQ9: CLINICAL INTERPRETATION OF PHQ2 SCORE: 0

## 2019-01-02 NOTE — ASSESSMENT & PLAN NOTE
Chronic pain recheck for: hip pain  Last dose of controlled substance: 3 days ago  Chronic pain treated with hydrocodone taken once every 2-3 days    He  reports that he does not drink alcohol.  He  reports that he does not use drugs.    Interval history:   Any major change in health since last appointment? No    Consequences of Chronic Opiate therapy:  (5 A's)  Analgesia: Compared to no treatment or prior treatment, pain is currently improved  Activity: improved  Adverse Events: denies constipation, dry mouth, itchy skin, nausea and sedation  Aberrant Behaviors: He reports he is taking medication as prescribed and is not veering from agreed treatment regimen or provider recommendations. There have been no inappropriate refills or lost/stolen meds reported.  Affect/Mood: Pain is impacting patient's mood.  Patient denies depression/anxiety.    Nonnarcotic treatments that are being used: Tylenol, NSAIDs/GOMEZ-2, muscle relaxers, ice and heat.     Last imagin    Opioid Risk Score: 3    Interpretation of Opioid Risk Score   Score 0-3 = Low risk of abuse. Do UDS at least once per year.  Score 4-7 = Moderate risk of abuse. Do UDS 1-4 times per year.  Score 8+ = High risk of abuse. Refer to specialist.    No order of CONTROLLED SUBSTANCE TREATMENT AGREEMENT is found.     UDS Summary     Patient has no health maintenance due at this time        Most recent UDS reviewed today and is consistent with prescribed medications.     I have reviewed the medical records, the Prescription Monitoring Program and I have determined that controlled substance treatment is medically indicated.

## 2019-01-02 NOTE — PROGRESS NOTES
Chief Complaint   Patient presents with   • Hip Pain     feeling better now    • Medication Problem       HISTORY OF THE PRESENT ILLNESS: This is a 68 y.o. male established patient who presents today for follow up.    Right Hip Pain  Hip pain after right hip replacement has improved, but the pain is worsened by the cold weather. It also tends to be more painful throughout the week due to work. He notes he has also joined a gym recently to try and build up muscle in his right leg. He has been taking his Norco 5-325 mg without any side effects.    Sore Throat  He has been having an irritated throat acute onset 5 days ago with yellow phlegm production with an associated mild headache. Denies any fever. He is also taking Amoxicillin prior to a dental procedure.    Alcoholic liver disease, unspecified (HCC)  Chronic in nature. Stable. Recent labs within normal limits. Follows with Dr. Dinero.    Alcohol dependence in remission (HCC)  Chronic in nature. Stable. History of alcoholism no drink in the last 5 years.    Gastroesophageal reflux disease with esophagitis  Taking medication daily. No early satiety, unintentional weight loss, choking, persistent burning pain in chest or upper abdomen.      Chronic use of opiate drugs therapeutic purposes  Chronic pain recheck for: hip pain  Last dose of controlled substance: 3 days ago  Chronic pain treated with hydrocodone taken once every 2-3 days    He  reports that he does not drink alcohol.  He  reports that he does not use drugs.    Interval history:   Any major change in health since last appointment? No    Consequences of Chronic Opiate therapy:  (5 A's)  Analgesia: Compared to no treatment or prior treatment, pain is currently improved  Activity: improved  Adverse Events: denies constipation, dry mouth, itchy skin, nausea and sedation  Aberrant Behaviors: He reports he is taking medication as prescribed and is not veering from agreed treatment regimen or provider  recommendations. There have been no inappropriate refills or lost/stolen meds reported.  Affect/Mood: Pain is impacting patient's mood.  Patient denies depression/anxiety.    Nonnarcotic treatments that are being used: Tylenol, NSAIDs/GOMEZ-2, muscle relaxers, ice and heat.     Last imagin    Opioid Risk Score: 3    Interpretation of Opioid Risk Score   Score 0-3 = Low risk of abuse. Do UDS at least once per year.  Score 4-7 = Moderate risk of abuse. Do UDS 1-4 times per year.  Score 8+ = High risk of abuse. Refer to specialist.    No order of CONTROLLED SUBSTANCE TREATMENT AGREEMENT is found.     UDS Summary     Patient has no health maintenance due at this time        Most recent UDS reviewed today and is consistent with prescribed medications.     I have reviewed the medical records, the Prescription Monitoring Program and I have determined that controlled substance treatment is medically indicated.       He has been compliant with taking all other medications without any side effects.    Past Medical History:   Diagnosis Date   • Chronic back pain    • Diverticulitis    • GERD (gastroesophageal reflux disease)        Past Surgical History:   Procedure Laterality Date   • COLONOSCOPY - ENDO  2014    Performed by Moris Stanton D.O. at [facility]       Family Status   Relation Status   • Mo    • Fa    • Bro Alive   • MGMo    • MGFa    • PGMo    • PGFa      Family History   Problem Relation Age of Onset   • Dementia Mother    • Heart Disease Father        Social History   Substance Use Topics   • Smoking status: Never Smoker   • Smokeless tobacco: Never Used      Comment: +second-hand exposure   • Alcohol use No      Comment: quit 4  years        Allergies: Pantoprazole and Pantoprazole    Current Outpatient Prescriptions Ordered in [facility]   Medication Sig Dispense Refill   • HYDROcodone-acetaminophen (NORCO) 5-325 MG Tab per tablet Take  "1-2 Tabs by mouth every four hours as needed for up to 90 days. 45 Tab 0   • fluticasone (FLONASE) 50 MCG/ACT nasal spray Spray 1 Spray in nose every day. 1 Bottle 6   • zolpidem (AMBIEN) 10 MG Tab TAKE 1 TABLET BY MOUTH AT BEDTIME AS NEEDED FOR SLEEP FOR UP TO 30 DAYS(F51.01) 30 Tab 3   • raNITidine (ZANTAC) 150 MG Tab TAKE ONE TABLET BY MOUTH TWICE DAILY FOR REFLUX DISEASE 180 Tab 3   • vitamin D (CHOLECALCIFEROL) 1000 UNIT Tab Take 1,000 Units by mouth every day.     • zolpidem (AMBIEN) 5 MG Tab Take 5 mg by mouth at bedtime as needed for Sleep.       No current Epic-ordered facility-administered medications on file.        Review of Systems   Constitutional:  Negative for fever, chills, weight loss and malaise/fatigue.   HENT: Minimal sore throat. Negative for ear pain, nosebleeds, congestion, and neck pain.    Musculoskeletal: Right hip pain. Negative for myalgias and back pain.     All other systems reviewed and are negative except as in HPI.    Exam: Blood pressure 134/80, pulse 82, temperature 36.6 °C (97.8 °F), temperature source Temporal, resp. rate 16, height 1.753 m (5' 9\"), weight 68 kg (150 lb), SpO2 95 %.  General:  Normal appearing. No distress.  HEENT: MIld erythema in posterior oropharynx without edema or exudates. Normocephalic. Eyes conjunctiva clear lids without ptosis, pupils equal and reactive to light accommodation, ears normal shape and contour, canals are clear bilaterally, tympanic membranes are benign, nasal mucosa benign,.   Pulmonary:  Clear to ausculation.  Normal effort. No rales, ronchi, or wheezing.  Cardiovascular:  Regular rate and rhythm without murmur. Carotid and radial pulses are intact and equal bilaterally.  Neurologic:  Grossly nonfocal  Lymph: No cervical, supraclavicular or axillary lymph nodes are palpable  Skin:  Warm and dry.  No obvious lesions.  Musculoskeletal:  Normal gait. No extremity cyanosis, clubbing, or edema.  Psych:  Normal mood and affect. Alert and " oriented x3. Judgment and insight is normal.    PLAN:    1. Status post right hip replacement  He has had improved pain in his right hip. Discussed increased exercise and plans for taking him off of Norco. Will provide another refill and then reevaluate upon his next appointment to discuss his pain and strength levels with the intent of finding alternative medications. He states he sometimes feels jittery after taking Tylenol; discussed maximum allowed Tylenol dosage per day.  - HYDROcodone-acetaminophen (NORCO) 5-325 MG Tab per tablet; Take 1-2 Tabs by mouth every four hours as needed for up to 90 days.  Dispense: 45 Tab; Refill: 0    2. Seasonal allergies  Well controlled with Flonase and other OTC medications.    3. Chronic use of opiate drugs therapeutic purposes  See number 1.  - HYDROcodone-acetaminophen (NORCO) 5-325 MG Tab per tablet; Take 1-2 Tabs by mouth every four hours as needed for up to 90 days.  Dispense: 45 Tab; Refill: 0    4. Fatigue, unspecified type  No acute complaints today. He has had increased activity post right hip replacement. Ordered labs.  - COMP METABOLIC PANEL; Future  - VITAMIN D,25 HYDROXY; Future    5. Hyperlipidemia, unspecified hyperlipidemia type  Ordered labs  - Lipid Profile; Future    6. Gastroesophageal reflux disease with esophagitis  Well controlled with Ranitidine. Ordered labs  - COMP METABOLIC PANEL; Future    7. Acute URI  Patient describes symptoms as minimal and improving. He is already taking Amoxicillin which he is taking prior to a dental procedure.  - fluticasone (FLONASE) 50 MCG/ACT nasal spray; Spray 1 Spray in nose every day.  Dispense: 1 Bottle; Refill: 6    8. Alcoholic liver disease, unspecified (HCC)  Continue follow-up with GI    9. Alcohol dependence in remission (HCC)  Continue alcohol cessation    Follow-up in 3 month or sooner as needed. Patient is encouraged to be seen in the emergency room for chest pain, palpitations, shortness of breath,  dizziness, severe abdominal pain or other concerning symptoms.    Please note that this dictation was created using voice recognition software. I have made every reasonable attempt to correct obvious errors, but I expect that there are errors of grammar and possibly content that I did not discover before finalizing the note.      Assessment/Plan  1. Status post right hip replacement  HYDROcodone-acetaminophen (NORCO) 5-325 MG Tab per tablet   2. Seasonal allergies     3. Chronic use of opiate drugs therapeutic purposes  HYDROcodone-acetaminophen (NORCO) 5-325 MG Tab per tablet   4. Fatigue, unspecified type  COMP METABOLIC PANEL    VITAMIN D,25 HYDROXY   5. Hyperlipidemia, unspecified hyperlipidemia type  Lipid Profile   6. Gastroesophageal reflux disease with esophagitis  COMP METABOLIC PANEL   7. Acute URI  fluticasone (FLONASE) 50 MCG/ACT nasal spray   8. Alcoholic liver disease, unspecified (HCC)     9. Alcohol dependence in remission (HCC)            Orlando ADRIAN (Scribe), am scribing for, and in the presence of, KAILA Pang    Electronically signed by: Orlando Blankenship (Scribe), 1/2/2019    Ryan ADRIAN APRN personally performed the services described in this documentation, as scribed by Orlanod Blankenship in my presence, and it is both accurate and complete.

## 2019-01-07 ENCOUNTER — APPOINTMENT (RX ONLY)
Dept: URBAN - METROPOLITAN AREA CLINIC 4 | Facility: CLINIC | Age: 69
Setting detail: DERMATOLOGY
End: 2019-01-07

## 2019-01-07 DIAGNOSIS — B02.9 ZOSTER WITHOUT COMPLICATIONS: ICD-10-CM

## 2019-01-07 PROBLEM — L57.0 ACTINIC KERATOSIS: Status: ACTIVE | Noted: 2019-01-07

## 2019-01-07 PROCEDURE — 99214 OFFICE O/P EST MOD 30 MIN: CPT

## 2019-01-07 PROCEDURE — ? COUNSELING

## 2019-01-07 PROCEDURE — ? PRESCRIPTION

## 2019-01-07 PROCEDURE — ? ADDITIONAL NOTES

## 2019-01-07 RX ORDER — ACYCLOVIR 800 MG/1
TABLET ORAL
Qty: 35 | Refills: 0 | Status: ERX | COMMUNITY
Start: 2019-01-07

## 2019-01-07 RX ADMIN — ACYCLOVIR 1: 800 TABLET ORAL at 00:00

## 2019-01-07 ASSESSMENT — LOCATION DETAILED DESCRIPTION DERM
LOCATION DETAILED: RIGHT POSTERIOR NECK
LOCATION DETAILED: LEFT ANTERIOR PROXIMAL UPPER ARM

## 2019-01-07 ASSESSMENT — LOCATION SIMPLE DESCRIPTION DERM
LOCATION SIMPLE: LEFT UPPER ARM
LOCATION SIMPLE: POSTERIOR NECK

## 2019-01-07 ASSESSMENT — LOCATION ZONE DERM
LOCATION ZONE: NECK
LOCATION ZONE: ARM

## 2019-01-07 NOTE — PROCEDURE: ADDITIONAL NOTES
Detail Level: Simple
Additional Notes: Pt has to take Norco for chronic right hip pain, secondary to hardware, he is managed by his PCP on pain contract. However recently he is attempting to wean off.  Therefore I would like him to use his Norco for pain prn, if he is taking more frequently for the shingles, then wean off and start neurontin.

## 2019-01-10 ENCOUNTER — RX ONLY (OUTPATIENT)
Age: 69
Setting detail: RX ONLY
End: 2019-01-10

## 2019-01-10 RX ORDER — GABAPENTIN 300 MG/1
CAPSULE ORAL
Qty: 60 | Refills: 0 | Status: ERX | COMMUNITY
Start: 2019-01-10

## 2019-02-01 ENCOUNTER — APPOINTMENT (RX ONLY)
Dept: URBAN - METROPOLITAN AREA CLINIC 20 | Facility: CLINIC | Age: 69
Setting detail: DERMATOLOGY
End: 2019-02-01

## 2019-02-01 ENCOUNTER — RX ONLY (OUTPATIENT)
Age: 69
Setting detail: RX ONLY
End: 2019-02-01

## 2019-02-01 DIAGNOSIS — B02.29 OTHER POSTHERPETIC NERVOUS SYSTEM INVOLVEMENT: ICD-10-CM

## 2019-02-01 DIAGNOSIS — B02.9 ZOSTER WITHOUT COMPLICATIONS: ICD-10-CM

## 2019-02-01 PROCEDURE — ? COUNSELING

## 2019-02-01 PROCEDURE — ? ADDITIONAL NOTES

## 2019-02-01 PROCEDURE — ? PRESCRIPTION

## 2019-02-01 PROCEDURE — 99214 OFFICE O/P EST MOD 30 MIN: CPT

## 2019-02-01 RX ORDER — CLOBETASOL PROPIONATE 0.5 MG/G
CREAM TOPICAL
Qty: 1 | Refills: 0 | Status: ERX | COMMUNITY
Start: 2019-02-01

## 2019-02-01 RX ORDER — GABAPENTIN 300 MG/1
CAPSULE ORAL
Qty: 60 | Refills: 3 | Status: ERX

## 2019-02-01 RX ADMIN — CLOBETASOL PROPIONATE THIN LAYER: 0.5 CREAM TOPICAL at 00:00

## 2019-02-01 ASSESSMENT — LOCATION DETAILED DESCRIPTION DERM
LOCATION DETAILED: LEFT SUPERIOR LATERAL UPPER BACK
LOCATION DETAILED: LEFT ANTERIOR SHOULDER
LOCATION DETAILED: LEFT ANTERIOR PROXIMAL UPPER ARM
LOCATION DETAILED: RIGHT POSTERIOR NECK

## 2019-02-01 ASSESSMENT — LOCATION SIMPLE DESCRIPTION DERM
LOCATION SIMPLE: LEFT UPPER ARM
LOCATION SIMPLE: POSTERIOR NECK
LOCATION SIMPLE: LEFT UPPER BACK
LOCATION SIMPLE: LEFT SHOULDER

## 2019-02-01 ASSESSMENT — LOCATION ZONE DERM
LOCATION ZONE: NECK
LOCATION ZONE: TRUNK
LOCATION ZONE: ARM

## 2019-02-01 NOTE — PROCEDURE: ADDITIONAL NOTES
Detail Level: Simple
Additional Notes: Plan;\\n\\nContinue neurontin 300mg BID (refilled today) anticipate for 3 months before titrating off\\nPt to consult w/ dispensary regarding CBD cream for pain, \\n\\nIf no relief do the following:\\n\\n-I will send Lidocaine patch, if no relief increase neurontin to 300mg TID; if no relief consider changing neurontin to Lyrica, if no relief then refer to pain management

## 2019-03-04 ENCOUNTER — APPOINTMENT (RX ONLY)
Dept: URBAN - METROPOLITAN AREA CLINIC 20 | Facility: CLINIC | Age: 69
Setting detail: DERMATOLOGY
End: 2019-03-04

## 2019-03-04 DIAGNOSIS — B02.9 ZOSTER WITHOUT COMPLICATIONS: ICD-10-CM | Status: RESOLVING

## 2019-03-04 DIAGNOSIS — B02.29 OTHER POSTHERPETIC NERVOUS SYSTEM INVOLVEMENT: ICD-10-CM

## 2019-03-04 PROCEDURE — ? COUNSELING

## 2019-03-04 PROCEDURE — ? ADDITIONAL NOTES

## 2019-03-04 PROCEDURE — 99213 OFFICE O/P EST LOW 20 MIN: CPT

## 2019-03-04 ASSESSMENT — LOCATION DETAILED DESCRIPTION DERM
LOCATION DETAILED: LEFT ANTERIOR PROXIMAL UPPER ARM
LOCATION DETAILED: LEFT SUPERIOR LATERAL UPPER BACK
LOCATION DETAILED: LEFT ANTERIOR SHOULDER
LOCATION DETAILED: RIGHT POSTERIOR NECK

## 2019-03-04 ASSESSMENT — LOCATION ZONE DERM
LOCATION ZONE: ARM
LOCATION ZONE: TRUNK
LOCATION ZONE: NECK

## 2019-03-04 ASSESSMENT — LOCATION SIMPLE DESCRIPTION DERM
LOCATION SIMPLE: LEFT UPPER ARM
LOCATION SIMPLE: LEFT UPPER BACK
LOCATION SIMPLE: LEFT SHOULDER
LOCATION SIMPLE: POSTERIOR NECK

## 2019-03-04 NOTE — PROCEDURE: ADDITIONAL NOTES
Additional Notes: Pt denies pain, tingling or rash.\\n\\nRecommended shingles vaccination.\\n\\nWhile in sun, keep covered. Reduce stress to body. \\nSurfing; wear wetsuit.
Detail Level: Simple
Additional Notes: Directions to taper off neurontin 300mg\\nTake once a day for 2 weeks\\nThen every other day for 1 month\\nThen every 3rd day for 2 weeks\\nThen once a week for 1 week\\nThe stop.

## 2019-03-13 ENCOUNTER — TELEPHONE (OUTPATIENT)
Dept: MEDICAL GROUP | Facility: PHYSICIAN GROUP | Age: 69
End: 2019-03-13

## 2019-03-13 NOTE — TELEPHONE ENCOUNTER
VOICEMAIL  1. Caller Name: Vickey Turner                        Call Back Number: [phone] (home) [phone] (work)      2. Message: Called and left patient a message to call back.     3. Patient approves office to leave a detailed voicemail/MyChart message: N\A

## 2019-03-13 NOTE — TELEPHONE ENCOUNTER
Pt has an appointment for April 1 and would like to have labs ordered before his appt. And he states he had contracted shingles and wants to know if he needs labs for shingles too. Please call pt with any questions

## 2019-03-14 NOTE — TELEPHONE ENCOUNTER
Phone Number Called: [phone] (home) [phone] (work)      Message: Patient returned phone call. Let patient know there was already some labs in there if he wants to get those done. Patient will get those done if he needs more they will be ordered at [facility]     Left Message for patient to call back: no

## 2019-03-18 ENCOUNTER — HOSPITAL ENCOUNTER (OUTPATIENT)
Dept: LAB | Facility: MEDICAL CENTER | Age: 69
End: 2019-03-18
Attending: NURSE PRACTITIONER
Payer: MEDICARE

## 2019-03-18 DIAGNOSIS — E78.5 HYPERLIPIDEMIA, UNSPECIFIED HYPERLIPIDEMIA TYPE: ICD-10-CM

## 2019-03-18 DIAGNOSIS — R53.83 FATIGUE, UNSPECIFIED TYPE: ICD-10-CM

## 2019-03-18 DIAGNOSIS — K21.00 GASTROESOPHAGEAL REFLUX DISEASE WITH ESOPHAGITIS: ICD-10-CM

## 2019-03-18 LAB
25(OH)D3 SERPL-MCNC: 54 NG/ML (ref 30–100)
ALBUMIN SERPL BCP-MCNC: 4.4 G/DL (ref 3.2–4.9)
ALBUMIN/GLOB SERPL: 1.8 G/DL
ALP SERPL-CCNC: 75 U/L (ref 30–99)
ALT SERPL-CCNC: 18 U/L (ref 2–50)
ANION GAP SERPL CALC-SCNC: 5 MMOL/L (ref 0–11.9)
AST SERPL-CCNC: 20 U/L (ref 12–45)
BILIRUB SERPL-MCNC: 0.4 MG/DL (ref 0.1–1.5)
BUN SERPL-MCNC: 14 MG/DL (ref 8–22)
CALCIUM SERPL-MCNC: 9.4 MG/DL (ref 8.5–10.5)
CHLORIDE SERPL-SCNC: 103 MMOL/L (ref 96–112)
CHOLEST SERPL-MCNC: 215 MG/DL (ref 100–199)
CO2 SERPL-SCNC: 30 MMOL/L (ref 20–33)
CREAT SERPL-MCNC: 0.93 MG/DL (ref 0.5–1.4)
FASTING STATUS PATIENT QL REPORTED: NORMAL
GLOBULIN SER CALC-MCNC: 2.4 G/DL (ref 1.9–3.5)
GLUCOSE SERPL-MCNC: 89 MG/DL (ref 65–99)
HDLC SERPL-MCNC: 65 MG/DL
LDLC SERPL CALC-MCNC: 136 MG/DL
POTASSIUM SERPL-SCNC: 4.4 MMOL/L (ref 3.6–5.5)
PROT SERPL-MCNC: 6.8 G/DL (ref 6–8.2)
SODIUM SERPL-SCNC: 138 MMOL/L (ref 135–145)
TRIGL SERPL-MCNC: 72 MG/DL (ref 0–149)

## 2019-03-18 PROCEDURE — 36415 COLL VENOUS BLD VENIPUNCTURE: CPT

## 2019-03-18 PROCEDURE — 80053 COMPREHEN METABOLIC PANEL: CPT

## 2019-03-18 PROCEDURE — 82306 VITAMIN D 25 HYDROXY: CPT | Mod: GA

## 2019-03-18 PROCEDURE — 80061 LIPID PANEL: CPT

## 2019-04-01 ENCOUNTER — OFFICE VISIT (OUTPATIENT)
Dept: MEDICAL GROUP | Facility: PHYSICIAN GROUP | Age: 69
End: 2019-04-01
Payer: MEDICARE

## 2019-04-01 VITALS
HEIGHT: 69 IN | BODY MASS INDEX: 22.22 KG/M2 | WEIGHT: 150 LBS | TEMPERATURE: 98 F | OXYGEN SATURATION: 96 % | HEART RATE: 76 BPM | DIASTOLIC BLOOD PRESSURE: 80 MMHG | SYSTOLIC BLOOD PRESSURE: 130 MMHG | RESPIRATION RATE: 14 BRPM

## 2019-04-01 DIAGNOSIS — F10.21 ALCOHOL DEPENDENCE IN REMISSION (HCC): ICD-10-CM

## 2019-04-01 DIAGNOSIS — G89.29 CHRONIC BILATERAL LOW BACK PAIN WITHOUT SCIATICA: ICD-10-CM

## 2019-04-01 DIAGNOSIS — G89.29 CHRONIC RIGHT HIP PAIN: ICD-10-CM

## 2019-04-01 DIAGNOSIS — K21.00 GASTROESOPHAGEAL REFLUX DISEASE WITH ESOPHAGITIS: ICD-10-CM

## 2019-04-01 DIAGNOSIS — B02.9 HERPES ZOSTER WITHOUT COMPLICATION: ICD-10-CM

## 2019-04-01 DIAGNOSIS — Z00.00 MEDICARE ANNUAL WELLNESS VISIT, SUBSEQUENT: ICD-10-CM

## 2019-04-01 DIAGNOSIS — M25.551 CHRONIC RIGHT HIP PAIN: ICD-10-CM

## 2019-04-01 DIAGNOSIS — R35.0 URINARY FREQUENCY: ICD-10-CM

## 2019-04-01 DIAGNOSIS — M54.16 LUMBAR RADICULOPATHY: ICD-10-CM

## 2019-04-01 DIAGNOSIS — K57.92 DIVERTICULITIS: ICD-10-CM

## 2019-04-01 DIAGNOSIS — M54.50 CHRONIC BILATERAL LOW BACK PAIN WITHOUT SCIATICA: ICD-10-CM

## 2019-04-01 DIAGNOSIS — J30.2 SEASONAL ALLERGIES: ICD-10-CM

## 2019-04-01 DIAGNOSIS — K70.9 ALCOHOLIC LIVER DISEASE, UNSPECIFIED (HCC): ICD-10-CM

## 2019-04-01 DIAGNOSIS — G43.009 MIGRAINE WITHOUT AURA AND WITHOUT STATUS MIGRAINOSUS, NOT INTRACTABLE: ICD-10-CM

## 2019-04-01 DIAGNOSIS — F51.01 PRIMARY INSOMNIA: ICD-10-CM

## 2019-04-01 PROCEDURE — G0439 PPPS, SUBSEQ VISIT: HCPCS | Performed by: NURSE PRACTITIONER

## 2019-04-01 RX ORDER — GABAPENTIN 300 MG/1
300 CAPSULE ORAL 3 TIMES DAILY
COMMUNITY
End: 2019-09-11

## 2019-04-01 ASSESSMENT — PATIENT HEALTH QUESTIONNAIRE - PHQ9: CLINICAL INTERPRETATION OF PHQ2 SCORE: 0

## 2019-04-01 ASSESSMENT — ACTIVITIES OF DAILY LIVING (ADL): BATHING_REQUIRES_ASSISTANCE: 0

## 2019-04-01 ASSESSMENT — ENCOUNTER SYMPTOMS: GENERAL WELL-BEING: EXCELLENT

## 2019-04-01 NOTE — ASSESSMENT & PLAN NOTE
Chronic in nature. Stable. Continues GI follow-up.   Denies nausea/vomiting, blood in stool, diarrhea.

## 2019-04-01 NOTE — PROGRESS NOTES
Chief Complaint   Patient presents with   • Medicare Annual Wellness         HPI:  Vickey Turner is a 69 y.o. here for Medicare Annual Wellness Visit     Seasonal allergies  Chronic in nature. Stable. No issues currently doing well. flonase as needed.     Herpes zoster without complication  Resolving, continues gabapentin. Weaning off medication at this time. Completed acyclovir.      Diverticulitis  Chronic in nature. Stable. Continues GI follow-up.   Denies nausea/vomiting, blood in stool, diarrhea.     Insomnia  Chronic in nature. Sleeping well with Ambien as needed. Reports good sleep hygiene.     Alcohol dependence in remission (HCC)  Chronic in nature. Stable. States he is doing well. No drink in last 6 years.     Lumbar radiculopathy  Chronic in nature. Stable. Follows with pain management as needed.     Chronic back pain  Chronic in nature. Stable. Continues follow-up with pain management as needed.    Gastroesophageal reflux disease with esophagitis  Taking medication daily. No early satiety, unintentional weight loss, choking, persistent burning pain in chest or upper abdomen.    Urinary frequency  Chronic in nature. Managed by urology.    Alcoholic liver disease, unspecified (HCC)  Chronic in nature. Stable. Recent labs within normal. Follows [facility].    Migraine without aura and without status migrainosus, not intractable  Chronic in nature. Stable. No change in symptoms. States he is doing well.     Chronic right hip pain  Chronic in nature. Stable. Doing well and not taking pain medicine at this time.         Patient Active Problem List    Diagnosis Date Noted   • Herpes zoster without complication 04/01/2019   • Chronic use of opiate drugs therapeutic purposes 01/02/2019   • Status post right hip replacement 08/23/2018   • Chronic right hip pain 06/28/2018   • Migraine without aura and without status migrainosus, not intractable 04/24/2018   • Urinary frequency 06/29/2017   • Alcoholic liver  disease, unspecified ([facility]) 06/29/2017   • Gastroesophageal reflux disease with esophagitis 08/05/2015   • Lumbar radiculopathy 05/15/2015   • DDD (degenerative disc disease), lumbar 05/15/2015   • Chronic back pain 05/15/2015   • Diverticulitis 11/19/2014   • Insomnia 11/19/2014   • Alcohol dependence in remission ([facility]) 11/19/2014   • Marijuana use 11/18/2014   • Seasonal allergies 10/29/2014       Current Outpatient Prescriptions   Medication Sig Dispense Refill   • gabapentin (NEURONTIN) 300 MG Cap Take 300 mg by mouth 3 times a day.     • HYDROcodone-acetaminophen (NORCO) 5-325 MG Tab per tablet Take 1-2 Tabs by mouth every four hours as needed for up to 90 days. 45 Tab 0   • fluticasone (FLONASE) 50 MCG/ACT nasal spray Spray 1 Spray in nose every day. 1 Bottle 6   • raNITidine (ZANTAC) 150 MG Tab TAKE ONE TABLET BY MOUTH TWICE DAILY FOR REFLUX DISEASE 180 Tab 3   • zolpidem (AMBIEN) 5 MG Tab Take 5 mg by mouth at bedtime as needed for Sleep.     • vitamin D (CHOLECALCIFEROL) 1000 UNIT Tab Take 1,000 Units by mouth every day.       No current facility-administered medications for this visit.             Current supplements as per medication list.       Allergies: Pantoprazole and Pantoprazole    Current social contact/activities: Ski;golf;surf; New GF      He  reports that he has never smoked. He has never used smokeless tobacco. He reports that he does not drink alcohol or use drugs.  Counseling given: Yes      DPA/Advanced Directive:  Patient does not have an Advanced Directive.  A packet and workshop information was given on Advanced Directives.    ROS:    Gait: Uses no assistive device  Ostomy: No  Other tubes: No  Amputations: No  Chronic oxygen use: No  Last eye exam: 01/2019  Wears hearing aids: No   : Denies any urinary leakage during the last 6 months    Screening:  Annual Exam   Depression Screening    Little interest or pleasure in doing things?  0 - not at all  Feeling down, depressed , or hopeless?  0 - not at all  Trouble falling or staying asleep, or sleeping too much?     Feeling tired or having little energy?     Poor appetite or overeating?     Feeling bad about yourself - or that you are a failure or have let yourself or your family down?    Trouble concentrating on things, such as reading the newspaper or watching television?    Moving or speaking so slowly that other people could have noticed.  Or the opposite - being so fidgety or restless that you have been moving around a lot more than usual?     Thoughts that you would be better off dead, or of hurting yourself?     Patient Health Questionnaire Score:      If depressive symptoms identified deferred to follow up visit unless specifically addressed in assessment and plan.    Interpretation of PHQ-9 Total Score   Score Severity   1-4 No Depression   5-9 Mild Depression   10-14 Moderate Depression   15-19 Moderately Severe Depression   20-27 Severe Depression      Screening for Cognitive Impairment    Three Minute Recall (leader, season, table) 3/3    Channing clock face with all 12 numbers and set the hands to show 10 past 11.  Yes    Cognitive concerns identified deferred for follow up unless specifically addressed in assessment and plan.    Fall Risk Assessment    Has the patient had two or more falls in the last year or any fall with injury in the last year?  Yes    Safety Assessment    Throw rugs on floor.  Yes  Handrails on all stairs.  No  Good lighting in all hallways.  No  Difficulty hearing.  No  Patient counseled about all safety risks that were identified.    Functional Assessment ADLs    Are there any barriers preventing you from cooking for yourself or meeting nutritional needs?  No.    Are there any barriers preventing you from driving safely or obtaining transportation?  No.    Are there any barriers preventing you from using a telephone or calling for help?  No.    Are there any barriers preventing you from shopping?  No.    Are there any  barriers preventing you from taking care of your own finances?  No.    Are there any barriers preventing you from managing your medications?  No.    Are there any barriers preventing you from showering, bathing or dressing yourself? No.    Are you currently engaging in any exercise or physical activity?  Yes.     What is your perception of your health?  Excellent.      Health Maintenance Summary                IMM HEP B VACCINE Postponed 10/18/2019 Originally 3/17/1969. Insurance/Financial    IMM ZOSTER VACCINES Postponed 10/18/2019 Originally 3/17/2002. System: vaccine not available, other system reasons    Annual Wellness Visit Next Due 4/25/2019      Done 4/24/2018 Visit Dx: Medicare annual wellness visit, initial    URINE DRUG SCREEN Next Due 6/28/2019      Done 7/3/2018 [facility] PAIN MANAGEMENT SCREEN     Patient has more history with this topic...    IMM DTaP/Tdap/Td Vaccine Next Due 2/16/2021      Done 2/16/2011 Imm Admin: Tdap Vaccine    COLONOSCOPY Next Due 11/17/2024      Done 11/17/2014 Surg:COLONOSCOPY - ENDO     Patient has more history with this topic...          Patient Care Team:  ELSI RawlsPNicRBREONNA as PCP - General (Family Medicine)  Alexis Hernandez M.D. as Consulting Physician (Pain Management)  Bharath Mathias M.D. (Family Medicine)  LINDA Rodarte as Consulting Physician (Urology)  Gurmeet Dinero Jr., M.D. as Consulting Physician (Gastroenterology)  Jose Kilgore, PT, DPT, OCS as Physical Therapy (Physical Therapy)      Social History   Substance Use Topics   • Smoking status: Never Smoker   • Smokeless tobacco: Never Used      Comment: +second-hand exposure   • Alcohol use No      Comment: quit 4  years      Family History   Problem Relation Age of Onset   • Dementia Mother    • Heart Disease Father      He  has a past medical history of Chronic back pain; Diverticulitis; and GERD (gastroesophageal reflux disease).   Past Surgical History:   Procedure  "Laterality Date   • COLONOSCOPY - ENDO  11/17/2014    Performed by Moris Stanton D.O. at ENDOSCOPY [facility] ORS       Exam:   Blood pressure 130/80, pulse 76, temperature 36.7 °C (98 °F), temperature source Temporal, resp. rate 14, height 1.753 m (5' 9\"), weight 68 kg (150 lb), SpO2 96 %. Body mass index is 22.15 kg/m².    Hearing good.    Dentition good  Alert, oriented in no acute distress.  Eye contact is good, speech goal directed, affect calm    Assessment and Plan. The following treatment and monitoring plan is recommended:    1. Seasonal allergies     2. Herpes zoster without complication     3. Diverticulitis     4. Primary insomnia     5. Alcohol dependence in remission (HCC)     6. Lumbar radiculopathy     7. Chronic bilateral low back pain without sciatica     8. Gastroesophageal reflux disease with esophagitis     9. Urinary frequency     10. Alcoholic liver disease, unspecified (HCC)     11. Migraine without aura and without status migrainosus, not intractable     12. Chronic right hip pain     13. Medicare annual wellness visit, subsequent       Continue current monitoring.     Services suggested: No services needed at this time  Health Care Screening: Age-appropriate preventive services recommended by USPTF and ACIP covered by Medicare were discussed today. Services ordered if indicated and agreed upon by the patient.  Referrals offered: Community-based lifestyle interventions to reduce health risks and promote self-management and wellness, fall prevention, nutrition, physical activity, tobacco-use cessation, weight loss, and mental health services as per orders if indicated.    Discussion today about general wellness and lifestyle habits:    · Prevent falls and reduce trip hazards; Cautioned about securing or removing rugs.  · Have a working fire alarm and carbon monoxide detector;   · Engage in regular physical activity and social activities     Follow-up: Return in about 1 year (around " 4/1/2020), or if symptoms worsen or fail to improve.

## 2019-05-20 DIAGNOSIS — G43.709 CHRONIC MIGRAINE WITHOUT AURA WITHOUT STATUS MIGRAINOSUS, NOT INTRACTABLE: ICD-10-CM

## 2019-05-20 NOTE — TELEPHONE ENCOUNTER
Phone Number Called: [phone] (home) [phone] (work)      Call outcome: Unable    Message: Tried to call pt, was unable to leave  as  is not set up.  Will try again

## 2019-05-20 NOTE — TELEPHONE ENCOUNTER
Phone Number Called: [phone] (home)     Call outcome: left message for patient to call back regarding message below    Message: LVM informing patient that we did get both voicemails and his request from the  and that Ryan will reply to his refill request upon her return tomorrow.

## 2019-05-20 NOTE — TELEPHONE ENCOUNTER
Can we reach out to Vickey to see how often he is using this?  Ryan refilled his prescription on 5/9/19 for #30.  -Dr. Taylor covering for NEVIN Rawls.

## 2019-05-20 NOTE — TELEPHONE ENCOUNTER
1. Caller Name: Vickey Turner                                         Call Back Number: [phone]       Patient approves a detailed voicemail message: yes    Patient stated he has been trying to get a refill on medication for Butal/acetamn. He is aware that Ryan is not here today he is willing to wait until she is able to get the refill in.   Please call patient with any questions.

## 2019-05-20 NOTE — TELEPHONE ENCOUNTER
Spoke with pt, pt states he has not picked up the 5/9/19 refill, as he was told by the pharmacy it was not approved by Ryan.  Will call the pharmacy and clarify.

## 2019-05-20 NOTE — TELEPHONE ENCOUNTER
Phone Number Called: [phone] (home) [phone] (work)      Call outcome: left message for patient to call back regarding message below    Message: Left VM for pt to call back.

## 2019-05-21 ENCOUNTER — TELEPHONE (OUTPATIENT)
Dept: MEDICAL GROUP | Facility: PHYSICIAN GROUP | Age: 69
End: 2019-05-21

## 2019-05-21 RX ORDER — BUTALBITAL, ACETAMINOPHEN AND CAFFEINE 50; 325; 40 MG/1; MG/1; MG/1
1 TABLET ORAL EVERY 4 HOURS PRN
Qty: 30 TAB | Refills: 0 | Status: SHIPPED | OUTPATIENT
Start: 2019-05-21 | End: 2019-06-20

## 2019-05-21 RX ORDER — ZOLPIDEM TARTRATE 10 MG/1
TABLET ORAL
COMMUNITY
Start: 2019-03-16 | End: 2019-07-02

## 2019-05-21 NOTE — TELEPHONE ENCOUNTER
Pt is here today because he said Bailey said Ryan has to call [facility] [employer] to authorize [facility] to be able to get his Butaliacetamin. Please with any questions ok to leave a detailed msg if no answer.Please pt know when this is taken care of so he can get his meds.

## 2019-05-28 ENCOUNTER — TELEPHONE (OUTPATIENT)
Dept: MEDICAL GROUP | Facility: PHYSICIAN GROUP | Age: 69
End: 2019-05-28

## 2019-05-28 NOTE — TELEPHONE ENCOUNTER
VOICEMAIL  1. Caller Name: Vickey Turner                        Call Back Number: [phone] (home) [phone] (work)      2. Message: Called and left patient a message. Patient insurance did not cover PA done for medication. Patient can either make an apt to come in and talk to Ryan about alternatives or he can try Imitrex.     3. Patient approves office to leave a detailed voicemail/MyChart message: N\A

## 2019-05-29 ENCOUNTER — APPOINTMENT (OUTPATIENT)
Dept: MEDICAL GROUP | Facility: PHYSICIAN GROUP | Age: 69
End: 2019-05-29
Payer: MEDICARE

## 2019-06-04 ENCOUNTER — OFFICE VISIT (OUTPATIENT)
Dept: MEDICAL GROUP | Facility: PHYSICIAN GROUP | Age: 69
End: 2019-06-04
Payer: MEDICARE

## 2019-06-04 VITALS
RESPIRATION RATE: 14 BRPM | SYSTOLIC BLOOD PRESSURE: 118 MMHG | OXYGEN SATURATION: 96 % | BODY MASS INDEX: 22.22 KG/M2 | TEMPERATURE: 97.7 F | HEIGHT: 69 IN | WEIGHT: 150 LBS | HEART RATE: 62 BPM | DIASTOLIC BLOOD PRESSURE: 68 MMHG

## 2019-06-04 DIAGNOSIS — G43.009 MIGRAINE WITHOUT AURA AND WITHOUT STATUS MIGRAINOSUS, NOT INTRACTABLE: ICD-10-CM

## 2019-06-04 DIAGNOSIS — F51.01 PRIMARY INSOMNIA: ICD-10-CM

## 2019-06-04 DIAGNOSIS — Z23 NEED FOR VACCINATION: ICD-10-CM

## 2019-06-04 DIAGNOSIS — Z96.641 STATUS POST RIGHT HIP REPLACEMENT: ICD-10-CM

## 2019-06-04 DIAGNOSIS — F10.21 ALCOHOL DEPENDENCE IN REMISSION (HCC): ICD-10-CM

## 2019-06-04 PROCEDURE — 99214 OFFICE O/P EST MOD 30 MIN: CPT | Performed by: NURSE PRACTITIONER

## 2019-06-04 RX ORDER — AMOXICILLIN 500 MG/1
CAPSULE ORAL
Refills: 1 | COMMUNITY
Start: 2019-05-17 | End: 2019-07-02

## 2019-06-04 NOTE — PROGRESS NOTES
Chief Complaint   Patient presents with   • Medication Problem     Headaches    • Medication Refill     Zoster rx        HISTORY OF THE PRESENT ILLNESS: This is a 69 y.o. male established patient who presents today for follow up.    Migraine without aura and without status migrainosus, not intractable  Chronic. Patient gets migraines about once per month. He was previously prescribed Fioricet as needed which works well, but his insurance is not covering this medication unless he tries Imitrex first. States he does not want to try Imitrex, as his current regimen with Fioricet has been working well. He recently paid out-of-pocket for his Fioricet refill. He takes the Fioricet only very occasionally. He is switching back to [employer] in October of this year. He mentions he has heard about taking marijuana/CBD for migraines but denies current use.    Status post right hip replacement  Patient had right hip replacement in Fall 2018. States his right hip has been hurting lately which he attributes to working a lot at his job recently. He has will be following up with ortho soon for this and states ortho wants him to follow up anyway. States he will likely have to get a left hip replacement as well. However, he states he will wait to get the surgery if he can. He predicts he will likely get the left hip replacement in October 2020.    Need for vaccination  Patient needs prescription to get the shingles vaccination.    Alcohol dependence in remission (HCC)  Stable. Patient is still not drinking alcohol.    Primary insomnia  Stable. Patient is currently on Ambien which has been working well for him in terms of his sleep. No reports of medication side effects or new associated symptoms regarding insomnia.        Past Medical History:   Diagnosis Date   • Chronic back pain    • Diverticulitis    • GERD (gastroesophageal reflux disease)        Past Surgical History:   Procedure Laterality Date   • COLONOSCOPY - ENDO  11/17/2014     Performed by Moris Stanton D.O. at ENDOSCOPY [facility]S       Family Status   Relation Status   • Mo    • Fa    • Bro Alive   • MGMo    • MGFa    • PGMo    • PGFa      Family History   Problem Relation Age of Onset   • Dementia Mother    • Heart Disease Father        Social History   Substance Use Topics   • Smoking status: Never Smoker   • Smokeless tobacco: Never Used      Comment: +second-hand exposure   • Alcohol use No      Comment: quit 4  years        Allergies: Pantoprazole and Pantoprazole    Current Outpatient Prescriptions Ordered in [facility]   Medication Sig Dispense Refill   • Zoster Vac Recomb Adjuvanted (SHINGRIX) 50 MCG/0.5ML Recon Susp 0.5 mL by Intramuscular route Once for 1 dose. Provide 2nd shot after 2 months. 0.5 mL 1   • zolpidem (AMBIEN) 10 MG Tab      • acetaminophen/caffeine/butalbital 325-40-50 mg (FIORICET) -40 MG Tab Take 1 Tab by mouth every four hours as needed for up to 30 days. FOR HEADACHE 30 Tab 0   • gabapentin (NEURONTIN) 300 MG Cap Take 300 mg by mouth 3 times a day.     • fluticasone (FLONASE) 50 MCG/ACT nasal spray Spray 1 Spray in nose every day. 1 Bottle 6   • raNITidine (ZANTAC) 150 MG Tab TAKE ONE TABLET BY MOUTH TWICE DAILY FOR REFLUX DISEASE 180 Tab 3   • vitamin D (CHOLECALCIFEROL) 1000 UNIT Tab Take 1,000 Units by mouth every day.     • amoxicillin (AMOXIL) 500 MG Cap TAKE FOUR CAPSULES BY MOUTH ONE HOUR PRIOR TO DENTAL APPOINTMENT  1   • zolpidem (AMBIEN) 5 MG Tab Take 5 mg by mouth at bedtime as needed for Sleep.       No current Epic-ordered facility-administered medications on file.        Review of Systems   Constitutional: Negative for fever, chills, weight loss and malaise/fatigue.   Respiratory: Negative for cough, sputum production, shortness of breath and wheezing.    Cardiovascular: Negative for chest pain, palpitations, orthopnea and leg swelling.   Musculoskeletal: Right hip pain. Negative for  "back pain.  Neurological: Chronic migraines (but not currently). Negative for dizziness, tingling, tremors, sensory change, focal weakness  All other systems reviewed and are negative except as in HPI.    Exam: /68 (BP Location: Left arm, Patient Position: Sitting, BP Cuff Size: Adult)   Pulse 62   Temp 36.5 °C (97.7 °F) (Temporal)   Resp 14   Ht 1.753 m (5' 9\")   Wt 68 kg (150 lb)   SpO2 96%   General:  Normal appearing. No distress.  Pulmonary:  Clear to ausculation.  Normal effort. No rales, ronchi, or wheezing.  Cardiovascular:  Regular rate and rhythm without murmur. Carotid and radial pulses are intact and equal bilaterally.  Neurologic:  Grossly nonfocal  Skin:  Warm and dry.  No obvious lesions.  Musculoskeletal:  Normal gait. No extremity cyanosis, clubbing, or edema.  Psych:  Normal mood and affect. Alert and oriented x3. Judgment and insight is normal.    PLAN:    1. Migraine without aura and without status migrainosus, not intractable  - Patient gets a migraine every once in a while. He takes Fioricet that was previously prescribed for him to use as needed which works well for him. He notes that he recently had to pay for this medication out-of-pocket due to his insurance not covering it unless he tries Imitrex first. Offered to have him try Imitrex, but he declines and wants to stay on the Fioricet. States he plans to switch back to [employer] who he states previously covered this medication for him.    2. Status post right hip replacement  - Patient reports having right hip pain lately which he attributes to working a lot recently. He will continue with attending upcoming appointment with ortho for this.     3. Need for vaccination  - Giving patient prescription to get the Shingrix vaccine  - Zoster Vac Recomb Adjuvanted (SHINGRIX) 50 MCG/0.5ML Recon Susp; 0.5 mL by Intramuscular route Once for 1 dose. Provide 2nd shot after 2 months.  Dispense: 0.5 mL; Refill: 1    4. Alcohol dependence in " remission (HCC)  - Stable. Patient is still not drinking alcohol. Will continue to monitor    5. Primary insomnia  - Stable on current regimen. Continue current plan of care and medications.     Follow-up in 6 month. Patient is encouraged to be seen in the emergency room for chest pain, palpitations, shortness of breath, dizziness, severe abdominal pain or other concerning symptoms.      Please note that this dictation was created using voice recognition software. I have made every reasonable attempt to correct obvious errors, but I expect that there are errors of grammar and possibly content that I did not discover before finalizing the note.      Assessment/Plan  1. Migraine without aura and without status migrainosus, not intractable     2. Status post right hip replacement     3. Need for vaccination  Zoster Vac Recomb Adjuvanted (SHINGRIX) 50 MCG/0.5ML Recon Susp   4. Alcohol dependence in remission (HCC)     5. Primary insomnia           I have placed the below orders and discussed them with an approved delegating provider. The MA is performing the below orders under the direction of Dr. Frank.       Jamel ADRIAN (Jordana), am scribing for, and in the presence of, KAILA Pang    Electronically signed by: Jamel Fishman (Jordana), 6/4/2019    Ryan ADRIAN APRN personally performed the services described in this documentation, as scribed by Jamel Fishman in my presence, and it is both accurate and complete.

## 2019-07-02 ENCOUNTER — OFFICE VISIT (OUTPATIENT)
Dept: MEDICAL GROUP | Facility: PHYSICIAN GROUP | Age: 69
End: 2019-07-02
Payer: MEDICARE

## 2019-07-02 VITALS
HEART RATE: 96 BPM | BODY MASS INDEX: 22.22 KG/M2 | RESPIRATION RATE: 16 BRPM | DIASTOLIC BLOOD PRESSURE: 68 MMHG | SYSTOLIC BLOOD PRESSURE: 110 MMHG | TEMPERATURE: 98.6 F | OXYGEN SATURATION: 93 % | WEIGHT: 150 LBS | HEIGHT: 69 IN

## 2019-07-02 DIAGNOSIS — Z96.641 STATUS POST RIGHT HIP REPLACEMENT: ICD-10-CM

## 2019-07-02 DIAGNOSIS — M62.830 SPASM OF LUMBAR PARASPINOUS MUSCLE: ICD-10-CM

## 2019-07-02 DIAGNOSIS — Z79.891 CHRONIC USE OF OPIATE DRUGS THERAPEUTIC PURPOSES: ICD-10-CM

## 2019-07-02 DIAGNOSIS — M54.50 ACUTE RIGHT-SIDED LOW BACK PAIN WITHOUT SCIATICA: ICD-10-CM

## 2019-07-02 DIAGNOSIS — M54.16 LUMBAR RADICULOPATHY: ICD-10-CM

## 2019-07-02 PROCEDURE — 99214 OFFICE O/P EST MOD 30 MIN: CPT | Performed by: NURSE PRACTITIONER

## 2019-07-02 RX ORDER — HYDROCODONE BITARTRATE AND ACETAMINOPHEN 5; 325 MG/1; MG/1
1-2 TABLET ORAL EVERY 6 HOURS PRN
Qty: 28 TAB | Refills: 0 | Status: SHIPPED | OUTPATIENT
Start: 2019-07-02 | End: 2019-07-09

## 2019-07-02 RX ORDER — TIZANIDINE 4 MG/1
4 TABLET ORAL EVERY 6 HOURS PRN
Qty: 90 TAB | Refills: 0 | Status: SHIPPED | OUTPATIENT
Start: 2019-07-02 | End: 2019-07-09 | Stop reason: SINTOL

## 2019-07-02 NOTE — PROGRESS NOTES
Chief Complaint   Patient presents with   • Pain     right lower back pain began 6/30 Pain @10       HISTORY OF THE PRESENT ILLNESS: This is a 69 y.o. male established patient who presents today for follow up.    Spasm of lumbar paraspinous muscle  Acute right-sided low back pain without sciatica  Lumbar radiculopathy  New complaint. Patient is here for progressively worsening muscle spasms, worsening in severity last night. He reports severe right lumbar back pain onset 6/29. He states he woke up Saturday morning to play golf and experienced a sharp stabbing/aching pain to his right lower back near his lumbar spine. His pain is localized and does not radiate down his leg or up his back. Patient's spasms are triggered when he moves in certain positions and notes just this morning he started to experience pain with deep inspiration. He is able to ambulate and lay down but has to lay in certain positions in order to prevent spasms. Despite his severe pain he was still able to work the past 2 days. He has taken Flexeril in the past. He tried a pain medication last night without relief as well as tried exercising without any improvement. He has, however, applied ice last night with some relief. Denies headache, trauma, falls, fever, or chills.    Status post right hip replacement  Patient had a right hip replacement in Fall 2018. He states that he believes his right hip may be attributed to his back pain.     Chronic use of opiate drugs therapeutic purposes  Patient has chronic pain and has treated his pain with Hydrocodone in the past. He took a pain pill last night for his back pain without any relief.    Acute pain   This is a new problem.   Patient is complaining of pain x 3 day(s) located in his right lumbar spine.  Pain is constant, described as sharp, shooting and a 10/10 on the pain scale.   Treatments tried include:ice and pain medication     Is the pain medication improving the patient’s symptoms: No  Any  adverse effects: No  Alcohol or illicit drug use:   He  reports that he does not drink alcohol.  He  reports that he does not use drugs.     History of controlled substance used in a way other than prescribed? No     Any early refills of a controlled substance: No  History of lost or stolen controlled substance prescription: No  Any aberrant behavior or intoxication while on a controlled substance: No  Has the patient self-modified their dose or frequency of the medication :No  Compliant with treatment recommendations and plan: Yes  Any major health change to the patient: No  Concerns for misuse, abuse or addiction: No  /NarxCheck report reviewed: Yes  History of abnormal drug screening: No  I have assessed the patient’s risk for abuse, dependency, and addiction using the validated Opioid Risk Tool.     Opioid Risk Score: 3       Interpretation of Opioid Risk Score   Score 0-3 = Low risk of abuse. Do UDS at least once per year.  Score 4-7 = Moderate risk of abuse. Do UDS 1-4 times per year.  Score 8+ = High risk of abuse. Refer to specialist.     I have conducted a physical exam and documented findings.     I certify that I have obtained and reviewed his medical history. I have also made a good guerrero effort to obtain applicable records from other providers who have treated the patient.  I have reviewed the patient's prescription history as maintained by the [place] Prescription Monitoring Program.      Given the above, I believe the benefits of controlled substance therapy outweigh the risks. The reasons for prescribing controlled substances include non-narcotic, oral analgesic alternatives have been inadequate for pain control. Accordingly, I have discussed the risk and benefits, treatment plan, and alternative therapies with the patient. Patient was advised that this medicine is intended for short term (no more than 14 days)/intermittent use only and not intended to be an ongoing prescription.      Past Medical  History:   Diagnosis Date   • Chronic back pain    • Diverticulitis    • GERD (gastroesophageal reflux disease)        Past Surgical History:   Procedure Laterality Date   • COLONOSCOPY - ENDO  2014    Performed by Moris Stanton D.O. at ENDOSCOPY [facility]       Family Status   Relation Status   • Mo    • Fa    • Bro Alive   • MGMo    • MGFa    • PGMo    • PGFa      Family History   Problem Relation Age of Onset   • Dementia Mother    • Heart Disease Father        Social History   Substance Use Topics   • Smoking status: Never Smoker   • Smokeless tobacco: Never Used      Comment: +second-hand exposure   • Alcohol use No      Comment: quit 4  years        Allergies: Pantoprazole and Pantoprazole    Current Outpatient Prescriptions Ordered in [facility]   Medication Sig Dispense Refill   • fluticasone (FLONASE) 50 MCG/ACT nasal spray Spray 1 Spray in nose every day. 1 Bottle 6   • raNITidine (ZANTAC) 150 MG Tab TAKE ONE TABLET BY MOUTH TWICE DAILY FOR REFLUX DISEASE 180 Tab 3   • vitamin D (CHOLECALCIFEROL) 1000 UNIT Tab Take 1,000 Units by mouth every day.     • amoxicillin (AMOXIL) 500 MG Cap TAKE FOUR CAPSULES BY MOUTH ONE HOUR PRIOR TO DENTAL APPOINTMENT  1   • zolpidem (AMBIEN) 10 MG Tab      • gabapentin (NEURONTIN) 300 MG Cap Take 300 mg by mouth 3 times a day.     • zolpidem (AMBIEN) 5 MG Tab Take 5 mg by mouth at bedtime as needed for Sleep.       No current Epic-ordered facility-administered medications on file.        Review of Systems   Constitutional: Negative for fever, chills, weight loss and malaise/fatigue.   HENT: Negative for ear pain, nosebleeds, congestion, sore throat and neck pain.    Eyes: Negative for blurred vision.   Respiratory: Negative for cough, sputum production, shortness of breath and wheezing.    Cardiovascular: Negative for chest pain, palpitations, orthopnea and leg swelling.   Gastrointestinal: Negative for heartburn,  "nausea, vomiting and abdominal pain.   Genitourinary: Negative for dysuria, urgency and frequency.   Musculoskeletal: Positive for back pain. Negative for myalgias and joint pain. Negative for trauma or falls.  Skin: Negative for rash and itching.   Neurological: Negative for dizziness, tingling, tremors, sensory change, focal weakness and headaches.   Endo/Heme/Allergies: Does not bruise/bleed easily.   Psychiatric/Behavioral: Negative for depression, anxiety, or memory loss.     All other systems reviewed and are negative except as in HPI.      Physical Exam  Exam: /68 (BP Location: Right arm, Patient Position: Sitting, BP Cuff Size: Adult)   Pulse 96   Temp 37 °C (98.6 °F) (Temporal)   Resp 16   Ht 1.753 m (5' 9\")   Wt 68 kg (150 lb)   SpO2 93%     General: Normal appearing. No distress.  HEENT: Normocephalic. Eyes conjunctiva clear lids without ptosis, pupils equal and reactive to light accommodation, ears normal shape and contour, canals are clear bilaterally, tympanic membranes are benign, nasal mucosa benign, oropharynx is without erythema, edema or exudates.   Pulmonary:  Clear to ausculation.  Normal effort. No rales, ronchi, or wheezing.  Cardiovascular:  Regular rate and rhythm without murmur. Carotid and radial pulses are intact and equal bilaterally.  Neurologic:  Grossly nonfocal  Skin:  Warm and dry.  No obvious lesions.  Musculoskeletal: Large knot and spasm paraspinous muscle on right side. Decreased range of motion in spine related to pain. Normal gait. No extremity cyanosis, clubbing, or edema.  Psych:  Normal mood and affect. Alert and oriented x3. Judgment and insight is normal.      PLAN:    1. Spasm of lumbar paraspinous muscle  - New complaint. Patient has severe spasms of his lumbar paraspinous muscle. Discussed treating his pain with Tizanidine. He is agreeable with this plan.  - Discussed the medication side effects and risks and benefits of Tizanidine. Patient will stop taking " this medication if his pain resolves or if he experiences side effects.  - Recommended applying ice to decrease inflammation and use a heating pad as needed.  - Encouraged trying topical anti-inflammatories.  - Patient advised not to lay flat.   - tizanidine (ZANAFLEX) 4 MG Tab; Take 1 Tab by mouth every 6 hours as needed.  Dispense: 90 Tab; Refill: 0  - HYDROcodone-acetaminophen (NORCO) 5-325 MG Tab per tablet; Take 1-2 Tabs by mouth every 6 hours as needed for up to 7 days.  Dispense: 28 Tab; Refill: 0    2. Acute right-sided low back pain without sciatica  - See 1 above.   - tizanidine (ZANAFLEX) 4 MG Tab; Take 1 Tab by mouth every 6 hours as needed.  Dispense: 90 Tab; Refill: 0  - HYDROcodone-acetaminophen (NORCO) 5-325 MG Tab per tablet; Take 1-2 Tabs by mouth every 6 hours as needed for up to 7 days.  Dispense: 28 Tab; Refill: 0  - DX-LUMBAR SPINE-2 OR 3 VIEWS; Future    3. Lumbar radiculopathy  - See 1 above.  - tizanidine (ZANAFLEX) 4 MG Tab; Take 1 Tab by mouth every 6 hours as needed.  Dispense: 90 Tab; Refill: 0  - HYDROcodone-acetaminophen (NORCO) 5-325 MG Tab per tablet; Take 1-2 Tabs by mouth every 6 hours as needed for up to 7 days.  Dispense: 28 Tab; Refill: 0  - DX-LUMBAR SPINE-2 OR 3 VIEWS; Future    4. Status post right hip replacement  - Improved and stable. He will continue to exercise.     5. Chronic use of opiate drugs therapeutic purposes  - Discussed the risks and benefits of narcotics with the patient. He verbalized his understanding and will comply to using the Tizanidine as directed.    Follow-up in 1 week or sooner as needed. Patient is encouraged to be seen in the emergency room for chest pain, palpitations, shortness of breath, dizziness, severe abdominal pain or other concerning symptoms.    Please note that this dictation was created using voice recognition software. I have made every reasonable attempt to correct obvious errors, but I expect that there are errors of grammar and  possibly content that I did not discover before finalizing the note.      Assessment/Plan  1. Spasm of lumbar paraspinous muscle     2. Acute right-sided low back pain without sciatica     3. Lumbar radiculopathy           I have placed the below orders and discussed them with an approved delegating provider. The MA is performing the below orders under the direction of Dr. Ngo.       I, Yulia Mchugh (Scribe), am scribing for, and in the presence of, KAILA Pang    Electronically signed by: Yulia Mchugh (Coyibradha), 7/2/2019    Ryan ADRIAN APRN personally performed the services described in this documentation, as scribed by Yulia Mchugh in my presence, and it is both accurate and complete.

## 2019-07-05 ENCOUNTER — HOSPITAL ENCOUNTER (OUTPATIENT)
Dept: RADIOLOGY | Facility: MEDICAL CENTER | Age: 69
End: 2019-07-05
Attending: NURSE PRACTITIONER
Payer: MEDICARE

## 2019-07-05 DIAGNOSIS — M54.50 ACUTE RIGHT-SIDED LOW BACK PAIN WITHOUT SCIATICA: ICD-10-CM

## 2019-07-05 DIAGNOSIS — M54.16 LUMBAR RADICULOPATHY: ICD-10-CM

## 2019-07-05 PROCEDURE — 72100 X-RAY EXAM L-S SPINE 2/3 VWS: CPT

## 2019-07-09 ENCOUNTER — OFFICE VISIT (OUTPATIENT)
Dept: MEDICAL GROUP | Facility: PHYSICIAN GROUP | Age: 69
End: 2019-07-09
Payer: MEDICARE

## 2019-07-09 VITALS
WEIGHT: 150 LBS | SYSTOLIC BLOOD PRESSURE: 128 MMHG | RESPIRATION RATE: 16 BRPM | TEMPERATURE: 97.9 F | HEIGHT: 69 IN | OXYGEN SATURATION: 96 % | BODY MASS INDEX: 22.22 KG/M2 | HEART RATE: 90 BPM | DIASTOLIC BLOOD PRESSURE: 80 MMHG

## 2019-07-09 DIAGNOSIS — G89.29 CHRONIC BILATERAL LOW BACK PAIN WITHOUT SCIATICA: ICD-10-CM

## 2019-07-09 DIAGNOSIS — M54.16 LUMBAR RADICULOPATHY: ICD-10-CM

## 2019-07-09 DIAGNOSIS — M54.50 CHRONIC BILATERAL LOW BACK PAIN WITHOUT SCIATICA: ICD-10-CM

## 2019-07-09 DIAGNOSIS — M62.830 SPASM OF LUMBAR PARASPINOUS MUSCLE: ICD-10-CM

## 2019-07-09 DIAGNOSIS — M54.50 ACUTE RIGHT-SIDED LOW BACK PAIN WITHOUT SCIATICA: ICD-10-CM

## 2019-07-09 PROCEDURE — 99214 OFFICE O/P EST MOD 30 MIN: CPT | Performed by: NURSE PRACTITIONER

## 2019-07-09 NOTE — PROGRESS NOTES
"Chief Complaint   Patient presents with   • Back Pain     feeling better; wrong movement and px    • Results     x rays        HISTORY OF THE PRESENT ILLNESS: This is a 69 y.o. male established patient who presents today for follow up on his lower back pain.    Lumbar radiculopathy  Acute right-sided low back pain without sciatica  Spasm of lumbar paraspinous muscle  Chronic bilateral low back pain without sciatica  Chronic. Patient states he has been seeing a chiropractor who has been working on his back. States seeing the chiropractor has significantly helped with his lower back pain. States he is able to get up and move around better. He will be seeing his chiropractor tomorrow and notes he was told it will probably be his last session as he has been doing well. At last visit, patient was prescribed Zanaflex which he states does not help much and causes him to have a \"tight stomach.\" He mainly has issues with the Zanaflex during the daytime but notes it helps him sleep at night. He reports having intermittent severe sharp right side lower back pains for which he takes the Norco which helps. The exacerbation episodes typically occur in the mornings and after he finishes work. He reports that he has had intermittent muscle spasms to his right leg that occurs when he wakes up. He has been taking more magnesium this week which has helped with these muscle spasms. He heard about collagen for muscle spasms and is wondering if he should take collagen supplements. No reports of any weakness or recent fevers.    Past Medical History:   Diagnosis Date   • Chronic back pain    • Diverticulitis    • GERD (gastroesophageal reflux disease)        Past Surgical History:   Procedure Laterality Date   • COLONOSCOPY - ENDO  2014    Performed by Moris Stanton D.O. at ENDOSCOPY [facility]       Family Status   Relation Status   • Mo    • Fa    • Bro Alive   • MGMo    • MGFa    • PGMo " "   • PGFa      Family History   Problem Relation Age of Onset   • Dementia Mother    • Heart Disease Father        Social History   Substance Use Topics   • Smoking status: Never Smoker   • Smokeless tobacco: Never Used      Comment: +second-hand exposure   • Alcohol use No      Comment: quit 4  years        Allergies: Pantoprazole and Pantoprazole    Current Outpatient Prescriptions Ordered in [facility]   Medication Sig Dispense Refill   • HYDROcodone-acetaminophen (NORCO) 5-325 MG Tab per tablet Take 1-2 Tabs by mouth every 6 hours as needed for up to 7 days. 28 Tab 0   • gabapentin (NEURONTIN) 300 MG Cap Take 300 mg by mouth 3 times a day.     • fluticasone (FLONASE) 50 MCG/ACT nasal spray Spray 1 Spray in nose every day. 1 Bottle 6   • raNITidine (ZANTAC) 150 MG Tab TAKE ONE TABLET BY MOUTH TWICE DAILY FOR REFLUX DISEASE 180 Tab 3   • vitamin D (CHOLECALCIFEROL) 1000 UNIT Tab Take 1,000 Units by mouth every day.       No current [facility]-ordered facility-administered medications on file.        Review of Systems   Constitutional: Negative for fever, chills, weight loss and malaise/fatigue.   Respiratory: Negative for cough, sputum production, shortness of breath and wheezing.    Cardiovascular: Negative for chest pain, palpitations, orthopnea and leg swelling.   Musculoskeletal: Lower back pain, back muscle spasms, right leg muscle spasms.  Neurological: Negative for dizziness, tingling, tremors, sensory change, focal weakness and headaches.      All other systems reviewed and are negative except as in HPI.    Exam: /80 (BP Location: Left arm, Patient Position: Sitting, BP Cuff Size: Adult)   Pulse 90   Temp 36.6 °C (97.9 °F) (Temporal)   Resp 16   Ht 1.753 m (5' 9\")   Wt 68 kg (150 lb)   SpO2 96%   General:  Normal appearing. No distress.  Neurologic:  Grossly nonfocal  Skin:  Warm and dry.  No obvious lesions.  Musculoskeletal:  Normal gait. No extremity cyanosis, clubbing, or edema.   Back: " "No tenderness to left side of spine. Severe tenderness to palpation along right side of spine, specifically at L4-L5, positive muscle spasms along right lumbar spine.  Psych:  Normal mood and affect. Alert and oriented x3. Judgment and insight is normal.    PLAN:    1. Lumbar radiculopathy  2. Acute right-sided low back pain without sciatica  3. Spasm of lumbar paraspinous muscle  4. Chronic bilateral low back pain without sciatica  - Patient states he has been seeing a chiropractor and has had significantly improved back pain. He will see the chiropractor tomorrow. Continue current plan of care and medications. He notes that the Zanaflex helps him at night but causes him to have a \"tight stomach\" during the day. Discussed concerns with other muscle relaxants.  - He can continue taking the magnesium supplements for the muscle spasms. He asked about using collagen supplements for the spasms but informed him there are no conclusive studies showing that collagen is beneficial for muscle spasms.  - REFERRAL TO PHYSIATRY (PMR)    Follow-up as needed. Patient is encouraged to be seen in the emergency room for chest pain, palpitations, shortness of breath, dizziness, severe abdominal pain or other concerning symptoms.     Please note that this dictation was created using voice recognition software. I have made every reasonable attempt to correct obvious errors, but I expect that there are errors of grammar and possibly content that I did not discover before finalizing the note.      Assessment/Plan  1. Lumbar radiculopathy  REFERRAL TO PHYSIATRY (PMR)   2. Acute right-sided low back pain without sciatica  REFERRAL TO PHYSIATRY (PMR)   3. Spasm of lumbar paraspinous muscle  REFERRAL TO PHYSIATRY (PMR)   4. Chronic bilateral low back pain without sciatica  REFERRAL TO PHYSIATRY (PMR)         I have placed the below orders and discussed them with an approved delegating provider. The MA is performing the below orders under the " direction of Dr. Frank.       I, Jamel Fishman (Scribe), am scribing for, and in the presence of, KAILA Pang    Electronically signed by: Jamel Fishman (Scribe), 7/9/2019    Ryan ADRIAN APRN personally performed the services described in this documentation, as scribed by Jamel Fishman in my presence, and it is both accurate and complete.

## 2019-07-23 ENCOUNTER — OFFICE VISIT (OUTPATIENT)
Dept: PHYSICAL MEDICINE AND REHAB | Facility: MEDICAL CENTER | Age: 69
End: 2019-07-23
Payer: MEDICARE

## 2019-07-23 VITALS
WEIGHT: 148.15 LBS | HEART RATE: 79 BPM | TEMPERATURE: 98 F | OXYGEN SATURATION: 97 % | DIASTOLIC BLOOD PRESSURE: 68 MMHG | BODY MASS INDEX: 21.94 KG/M2 | HEIGHT: 69 IN | SYSTOLIC BLOOD PRESSURE: 120 MMHG

## 2019-07-23 DIAGNOSIS — K21.9 GASTROESOPHAGEAL REFLUX DISEASE, ESOPHAGITIS PRESENCE NOT SPECIFIED: ICD-10-CM

## 2019-07-23 DIAGNOSIS — M47.816 LUMBAR SPONDYLOSIS: ICD-10-CM

## 2019-07-23 DIAGNOSIS — G89.29 CHRONIC RIGHT-SIDED LOW BACK PAIN WITH RIGHT-SIDED SCIATICA: ICD-10-CM

## 2019-07-23 DIAGNOSIS — M54.41 CHRONIC RIGHT-SIDED LOW BACK PAIN WITH RIGHT-SIDED SCIATICA: ICD-10-CM

## 2019-07-23 DIAGNOSIS — Z96.641 HISTORY OF RIGHT HIP REPLACEMENT: ICD-10-CM

## 2019-07-23 DIAGNOSIS — M54.41 ACUTE RIGHT-SIDED LOW BACK PAIN WITH RIGHT-SIDED SCIATICA: ICD-10-CM

## 2019-07-23 PROCEDURE — 99215 OFFICE O/P EST HI 40 MIN: CPT | Performed by: PHYSICAL MEDICINE & REHABILITATION

## 2019-07-23 RX ORDER — HYDROCODONE BITARTRATE AND ACETAMINOPHEN 5; 325 MG/1; MG/1
1-2 TABLET ORAL EVERY 4 HOURS PRN
COMMUNITY
End: 2019-07-23

## 2019-07-23 RX ORDER — HYDROCODONE BITARTRATE AND ACETAMINOPHEN 5; 325 MG/1; MG/1
1 TABLET ORAL EVERY 4 HOURS PRN
Qty: 40 TAB | Refills: 0 | Status: SHIPPED | OUTPATIENT
Start: 2019-07-23 | End: 2019-07-30

## 2019-07-23 RX ORDER — ZOLPIDEM TARTRATE 10 MG/1
10 TABLET ORAL NIGHTLY PRN
COMMUNITY
End: 2019-09-17 | Stop reason: SDUPTHER

## 2019-07-23 RX ORDER — TIZANIDINE 4 MG/1
4 TABLET ORAL EVERY 6 HOURS PRN
COMMUNITY
End: 2019-09-17

## 2019-07-23 ASSESSMENT — PATIENT HEALTH QUESTIONNAIRE - PHQ9: CLINICAL INTERPRETATION OF PHQ2 SCORE: 0

## 2019-07-23 ASSESSMENT — PAIN SCALES - GENERAL: PAINLEVEL: 10=SEVERE PAIN

## 2019-07-23 NOTE — PROGRESS NOTES
New patient note (patient is new to me. billed as a follow up as the patient was previously seen by Dr Hernandez in October 2016)    Physiatry (physical medicine and  Rehabilitation), interventional spine and sports medicine    Date of Service: 7/23/2019    Chief complaint:   Chief Complaint   Patient presents with   • New Patient      Lumbar radiculopathy        HISTORY    HPI: Vickey Turner 69 y.o. male who presents today with Diagnoses of History of right hip replacement, Lumbar spondylosis, Acute right-sided low back pain with right-sided sciatica, and Gastroesophageal reflux disease, esophagitis presence not specified. cannot take NSAIDs because of worsening GERD were pertinent to this visit.    History of intermittent chronic back pain for many years but this has been stable for years until recently. Right lower lumbar pain for three weeks. This was an acute onset stabbing and aching pain in the right low back radiating down the right leg, 10/10 intensity, acute onset while playing golf. Went to a chiropractor with mild improvement. Now using a cane to walk and he was not using this prior. Worse with walking and with movement. History of right hip replacement 8/8/2018. Cannot take NSAIDs because of GERD. He has tried taking tizanidine and norco.  The patient is highly active and involved with weight training, surfing, snow skiing, golf.  On the day of his injury he played 3 rounds of golf.  Currently the patient is having difficulty with all of these activities and using a cane as above. Associated numbness on the right lateral leg.     The patient stated he is evaluated by his orthopedist 1 week ago who did x-rays of the hips and stated there was no damage to the hardware.     Psychological testing for pain as depression and pain commonly coexist and need to both be treated.     Opioid Risk Score: 0    Interpretation of Opioid Risk Score   Score 0-3 = Low risk of abuse. Do UDS at least once per  year.  Score 4-7 = Moderate risk of abuse. Do UDS 1-4 times per year.  Score 8+ = High risk of abuse. Refer to specialist.    PHQ  Depression Screen (PHQ-2/PHQ-9) 1/2/2019 4/1/2019 7/23/2019   PHQ-2 Total Score - - -   PHQ-2 Total Score 0 0 0       Interpretation of PHQ-9 Total Score   Score Severity   1-4 No Depression   5-9 Mild Depression   10-14 Moderate Depression   15-19 Moderately Severe Depression   20-27 Severe Depression     Medical records review:  I reviewed several notes from Dr. Alexis Hernandez who performed several procedures including the following.  6/24/15 Lt L3-4-5 MBB #2 with 90-95% relief for the duration of the anesthetic. Hold on RFN. Consider Rt side.  6/4/15 Lt L3-4-5 MBB #1 with 70% relief for the duration of the anesthetic.          ROS:   Red Flags ROS:   Fever, Chills, Sweats: Denies  Involuntary Weight Loss: Denies  Bladder Incontinence: Denies  Bowel Incontinence: denies  Saddle Anesthesia: Denies    All other systems reviewed and negative.       PMHx:   Past Medical History:   Diagnosis Date   • Chronic back pain    • Diverticulitis    • GERD (gastroesophageal reflux disease)          Current Outpatient Prescriptions on File Prior to Visit   Medication Sig Dispense Refill   • raNITidine (ZANTAC) 150 MG Tab TAKE ONE TABLET BY MOUTH TWICE DAILY FOR REFLUX DISEASE 180 Tab 3   • vitamin D (CHOLECALCIFEROL) 1000 UNIT Tab Take 1,000 Units by mouth every day.     • gabapentin (NEURONTIN) 300 MG Cap Take 300 mg by mouth 3 times a day.     • fluticasone (FLONASE) 50 MCG/ACT nasal spray Spray 1 Spray in nose every day. 1 Bottle 6     No current facility-administered medications on file prior to visit.         PSHx:   Past Surgical History:   Procedure Laterality Date   • COLONOSCOPY - ENDO  11/17/2014    Performed by Moris Stanton D.O. at ENDOSCOPY [facility]       Family history   Family History   Problem Relation Age of Onset   • Dementia Mother    • Heart Disease Father   "        Medications: reviewed on epic.   Outpatient Prescriptions Marked as Taking for the 7/23/19 encounter (Office Visit) with Ernesto Dorado M.D.   Medication Sig Dispense Refill   • zolpidem (AMBIEN) 10 MG Tab Take 10 mg by mouth at bedtime as needed for Sleep.     • Butalbital-APAP-Caffeine (FIORICET PO) Take 10 mg by mouth.     • tizanidine (ZANAFLEX) 4 MG Tab Take 4 mg by mouth every 6 hours as needed.     • raNITidine (ZANTAC) 150 MG Tab TAKE ONE TABLET BY MOUTH TWICE DAILY FOR REFLUX DISEASE 180 Tab 3   • vitamin D (CHOLECALCIFEROL) 1000 UNIT Tab Take 1,000 Units by mouth every day.          Allergies:   Allergies   Allergen Reactions   • Pantoprazole    • Pantoprazole Rash     itching       Social Hx:   Social History     Social History   • Marital status:      Spouse name: N/A   • Number of children: N/A   • Years of education: N/A     Occupational History   • Not on file.     Social History Main Topics   • Smoking status: Never Smoker   • Smokeless tobacco: Never Used      Comment: +second-hand exposure   • Alcohol use No      Comment: quit 4  years    • Drug use: No      Comment: quit marijuana use 7/3/18   • Sexual activity: Not Currently     Other Topics Concern   •  Service No   • Blood Transfusions No   • Caffeine Concern No   • Occupational Exposure No   • Hobby Hazards No   • Sleep Concern No   • Stress Concern No   • Weight Concern No   • Special Diet No   • Back Care Yes   • Exercise Yes   • Bike Helmet No   • Seat Belt Yes   • Self-Exams Yes     Social History Narrative    ** Merged History Encounter **              EXAMINATION     Physical Exam:   Vitals: /68 (BP Location: Left arm, Patient Position: Sitting, BP Cuff Size: Adult)   Pulse 79   Temp 36.7 °C (98 °F) (Temporal)   Ht 1.753 m (5' 9\")   Wt 67.2 kg (148 lb 2.4 oz)   SpO2 97%     Constitutional:   Body Habitus: Body mass index is 21.88 kg/m².  Cooperation: Fully cooperates with exam  Appearance: " Well-groomed, well-nourished, not disheveled     Eyes: No scleral icterus to suggest severe liver disease, no proptosis to suggest severe hyperthyroid    ENT -no obvious auditory deficits, no obvious tongue lesions, tongue midline, no facial droop     Skin -no rashes or lesions noted     Respiratory-  breathing comfortable on room air, no audible wheezing    Cardiovascular- capillary refills less than 2 seconds. No lower extremity edema is noted.     Gastrointestinal - no obvious abdominal masses, No tenderness to palpation in the abdomen    Psychiatric- alert and oriented ×3. Normal affect.     Gait - normal gait, no use of ambulatory device, nonantalgic. the patient can toe walk with ease. the patient can heel walk with ease..     Musculoskeletal -     Thoracic/Lumbar Spine/Sacral Spine/Hips   Inspection: No evidence of atrophy in bilateral lower extremities throughout     ROM: full  AROM with flexion, extension, lateral flexion, and rotation bilaterally.  Pain with lumbar extension and extension rotation of the right which reproduce the patient's back pain.    Palpation:   No tenderness to palpation in midline at T1-T12 levels. No tenderness to palpation in the left and right of the midline T1-L5, POSITIVE for tenderness to palpation to the para-midline region in the lower lumbar levels on the right lower lumbar levels.  palpation over SI joint: negative bilaterally    palpation over buttock: negative bilaterally    palpation in hip or over the greater trochanters: negative bilaterally      Lumbar spine Special tests  Neuro tension  Straight leg test mildly positive right, negative left  Slump test mildly positive right, negative left    HIP  FAIR test negative bilaterally    Range of motion in the hips is within normal limits in flexion, extension, abduction, internal rotation, external rotation.    SI joint tests  Observation patient sits on one buttocks: Negative  SI joint compression negative bilaterally     SI joint distraction negative bilaterally    Thigh thrust test negative bilaterally    SALO test negative bilaterally       Neuro       Key points for the international standards for neurological classification of spinal cord injury (ISNCSCI) to light touch.     Dermatome R L                                      L2 2 2   L3 2 2   L4 2 2   L5 2 2   S1 2 2   S2 2 2         Motor Exam Lower Extremities    ? Myotome R L   Hip flexion L2 5 5   Knee extension L3 5 5   Ankle dorsiflexion L4 5 5   Toe extension L5 5- 5   Ankle plantarflexion S1 5 5           Tone on Modified Trip Scale    R L  R L   Shoulder Adduction Negative  Negative  Hip Flexion Negative  Negative    Elbow Flexion Negative  Negative  Hip Extension Negative  Negative    Elbow Extension Negative  Negative  Hip Adduction Negative  Negative    Wrist Flexion Negative  Negative  Knee Extension Negative  Negative    Finger Flexion Negative  Negative  Knee Flexion Negative  Negative       Dorsiflexion Negative  Negative       Plantar Flexion Negative  Negative      Babinski sign negative bilaterally   Clonus of the ankle negative bilaterally     Reflexes  ?  R L               Patella  2+ 2+   Achilles   2+ 2+           MEDICAL DECISION MAKING    Medical records review: see under HPI section.     DATA    Labs:   Lab Results   Component Value Date/Time    SODIUM 138 03/18/2019 07:23 AM    POTASSIUM 4.4 03/18/2019 07:23 AM    CHLORIDE 103 03/18/2019 07:23 AM    CO2 30 03/18/2019 07:23 AM    ANION 5.0 03/18/2019 07:23 AM    GLUCOSE 89 03/18/2019 07:23 AM    BUN 14 03/18/2019 07:23 AM    CREATININE 0.93 03/18/2019 07:23 AM    CALCIUM 9.4 03/18/2019 07:23 AM    ASTSGOT 20 03/18/2019 07:23 AM    ALTSGPT 18 03/18/2019 07:23 AM    TBILIRUBIN 0.4 03/18/2019 07:23 AM    ALBUMIN 4.4 03/18/2019 07:23 AM    TOTPROTEIN 6.8 03/18/2019 07:23 AM    GLOBULIN 2.4 03/18/2019 07:23 AM    AGRATIO 1.8 03/18/2019 07:23 AM   ]    Lab Results   Component Value Date/Time     PROTHROMBTM 12.9 07/24/2018 08:45 AM    INR 1.00 07/24/2018 08:45 AM        Lab Results   Component Value Date/Time    WBC 5.1 07/24/2018 08:45 AM    RBC 4.49 (L) 07/24/2018 08:45 AM    HEMOGLOBIN 14.5 07/24/2018 08:45 AM    HEMATOCRIT 43.4 07/24/2018 08:45 AM    MCV 96.7 07/24/2018 08:45 AM    MCH 32.3 07/24/2018 08:45 AM    MCHC 33.4 (L) 07/24/2018 08:45 AM    MPV 10.1 07/24/2018 08:45 AM    NEUTSPOLYS 60.60 07/24/2018 08:45 AM    LYMPHOCYTES 25.00 07/24/2018 08:45 AM    MONOCYTES 11.20 07/24/2018 08:45 AM    EOSINOPHILS 2.20 07/24/2018 08:45 AM    BASOPHILS 0.80 07/24/2018 08:45 AM        No results found for: HBA1C     Imaging:   I personally reviewed following images, these are my reads  X-ray lumbar spine 7/5/2019  Scoliosis likely degenerative.  Facet arthropathy in the lower lumbar levels.  Degenerative disc disease worse L5-S1.    X-ray bilateral hips 6/20/2018  Severe osteoarthritis of the right hip.  Moderate to severe osteoarthritis of the left hip.    IMAGING radiology reads. I reviewed the following radiology reads                                                        Results for orders placed in visit on 04/02/15   DX-LUMBAR SPINE-4+ VIEWS    Impression Multilevel degenerative changes. No acute compression.                                         Diagnosis   Visit Diagnoses     ICD-10-CM   1. History of right hip replacement Z96.641   2. Lumbar spondylosis M47.816   3. Acute right-sided low back pain with right-sided sciatica M54.41   4. Gastroesophageal reflux disease, esophagitis presence not specified. cannot take NSAIDs because of worsening GERD K21.9           ASSESSMENT AND PLAN:  Vickey Turner 69 y.o. male      Vickey was seen today for new patient.    Diagnoses and all orders for this visit:    History of right hip replacement  Comments:  Patient reports seeing his hip surgeon 1 week ago who stated there were no issues with his hip currently    Lumbar spondylosis  Comments:  Likely with  a component of right lower lumbar spondylosis contributing to his acute onset pain and chronic pain  Orders:  -     HYDROcodone-acetaminophen (NORCO) 5-325 MG Tab per tablet; Take 1 Tab by mouth every four hours as needed for up to 7 days.  -     MR-LUMBAR SPINE-W/O; Future  -     Consent for Opiate Prescription    Acute right-sided low back pain with right-sided sciatica  Comments:  Likely secondary to combination of facet arthropathy with likely new right lower lumbar disc herniation with radiculopathy  Orders:  -     HYDROcodone-acetaminophen (NORCO) 5-325 MG Tab per tablet; Take 1 Tab by mouth every four hours as needed for up to 7 days.  -     MR-LUMBAR SPINE-W/O; Future  -     Consent for Opiate Prescription    Gastroesophageal reflux disease, esophagitis presence not specified. cannot take NSAIDs because of worsening GERD    Chronic right-sided low back pain with right-sided sciatica  -     MR-LUMBAR SPINE-W/O; Future      The patient has had some improvement with Norco.  He cannot take NSAIDs because of his GERD.  Also tried Tylenol and muscle relaxers.  He is also tried gabapentin as well.  Patient reports stopping alcohol completely approximately 5 years ago.  The above [place] was for acute pain.  We also discussed that I would prefer for the patient not to be on chronic opioids.  This is only for the acute phase with the severe pain.  In an effort to decrease the opioid burden I have ordered an MRI of the lumbar spine for the above problems to hopefully focus treatment for this patient and titrate him off of opioids as soon as possible.    Last opioid risk scale: 7/23/2019        Opioid Risk Score: 0    Interpretation of Opioid Risk Score   Score 0-3 = Low risk of abuse. Do UDS at least once per year.  Score 4-7 = Moderate risk of abuse. Do UDS 1-4 times per year.  Score 8+ = High risk of abuse. Refer to specialist.     I reviewed the     In prescribing controlled substances to this patient, I certify  that I have obtained and reviewed the medical history of Vickey Turner. I have also made a good guerrero effort to obtain applicable records from other providers who have treated the patient and records did not demonstrate any increased risk of substance abuse that would prevent me from prescribing controlled substances.     I have conducted a physical exam and documented it. I have reviewed Mr. Turner’s prescription history as maintained by the [place] Prescription Monitoring Program.     I have assessed the patient’s risk for abuse, dependency, and addiction using the validated Opioid Risk Tool available at https://www.mdcalc.com/zwidoo-ekhn-tpdm-ort-narcotic-abuse.     Given the above, I believe the benefits of controlled substance therapy outweigh the risks. The reasons for prescribing controlled substances include non-narcotic, oral analgesic alternatives have been inadequate for pain control. Accordingly, I have discussed the risk and benefits, treatment plan, and alternative therapies with the patient.           Outside records requested:  The patient signed outside records request form for his outside records including outside images.  Including the records from Dr. Meliton De Paz the patient's orthopedic surgeon.      Follow-up: after the above imaging        Please note that this dictation was created using voice recognition software. I have made every reasonable attempt to correct obvious errors but there may be errors of grammar and content that I may have overlooked prior to finalization of this note.      Ernesto Dorado MD  Physical Medicine and Rehabilitation  Interventional Spine and Sports Physiatry  [facility]               CC Kenisha Parker*

## 2019-07-23 NOTE — Clinical Note
Dear Ryan Lopes, A.P.RBASSAM. ,   Thank you for the referral of Vickey Turner.  Please see my note for more details    Should you have any questions or concerns please do not hesitate to contact me.  Ernesto Dorado M.D.

## 2019-07-25 ENCOUNTER — OFFICE VISIT (OUTPATIENT)
Dept: PHYSICAL MEDICINE AND REHAB | Facility: MEDICAL CENTER | Age: 69
End: 2019-07-25
Payer: MEDICARE

## 2019-07-25 VITALS
WEIGHT: 149.69 LBS | DIASTOLIC BLOOD PRESSURE: 72 MMHG | TEMPERATURE: 98.9 F | HEART RATE: 81 BPM | HEIGHT: 69 IN | OXYGEN SATURATION: 95 % | SYSTOLIC BLOOD PRESSURE: 148 MMHG | BODY MASS INDEX: 22.17 KG/M2

## 2019-07-25 DIAGNOSIS — Z96.641 HISTORY OF RIGHT HIP REPLACEMENT: ICD-10-CM

## 2019-07-25 DIAGNOSIS — F40.240 CLAUSTROPHOBIA: ICD-10-CM

## 2019-07-25 DIAGNOSIS — M54.41 ACUTE RIGHT-SIDED LOW BACK PAIN WITH RIGHT-SIDED SCIATICA: ICD-10-CM

## 2019-07-25 DIAGNOSIS — M47.816 LUMBAR SPONDYLOSIS: ICD-10-CM

## 2019-07-25 PROCEDURE — 99214 OFFICE O/P EST MOD 30 MIN: CPT | Performed by: PHYSICAL MEDICINE & REHABILITATION

## 2019-07-25 RX ORDER — ALPRAZOLAM 1 MG/1
1 TABLET ORAL PRN
Qty: 2 TAB | Refills: 0 | Status: SHIPPED | OUTPATIENT
Start: 2019-07-25 | End: 2019-07-26

## 2019-07-25 ASSESSMENT — PAIN SCALES - GENERAL: PAINLEVEL: 7=MODERATE-SEVERE PAIN

## 2019-07-25 ASSESSMENT — PATIENT HEALTH QUESTIONNAIRE - PHQ9: CLINICAL INTERPRETATION OF PHQ2 SCORE: 0

## 2019-07-25 NOTE — PROGRESS NOTES
Follow up patient note  Interventional spine and sports physiatry, Physical medicine rehabilitation      Chief complaint:   Chief Complaint   Patient presents with   • Follow-Up     Hip pain          HISTORY    Please see new patient note dated  by Dr Dorado,  for more details.     HPI  Patient identification: Vickey Turner 69 y.o. male  With Diagnoses of Lumbar spondylosis, Acute right-sided low back pain with right-sided sciatica, History of right hip replacement, and Claustrophobia were pertinent to this visit.       - The patient was originally scheduled for an MRI for evaluation of his back pain.  However the patient has severe claustrophobia and is anxious about this.  He required medication in the past.  He also failed with an oral medication in the past to get through the MRI.  He is unsure of the exact medication.  He has required IV sedation previously for an MRI.        ROS Red Flags :   Fever, Chills, Sweats: Denies  Involuntary Weight Loss: Denies  Bowel/Bladder Incontinence: Denies  Saddle Anesthesia: Denies        PMHx:   Past Medical History:   Diagnosis Date   • Chronic back pain    • Diverticulitis    • GERD (gastroesophageal reflux disease)        PSHx:   Past Surgical History:   Procedure Laterality Date   • COLONOSCOPY - ENDO  11/17/2014    Performed by Moris Stanton D.O. at ENDOSCOPY [facility] ORS       Family history   Family History   Problem Relation Age of Onset   • Dementia Mother    • Heart Disease Father          Medications:   Outpatient Prescriptions Marked as Taking for the 7/25/19 encounter (Office Visit) with Ernesto Dorado M.D.   Medication Sig Dispense Refill   • ALPRAZolam (XANAX) 1 MG Tab Take 1 Tab by mouth as needed for Anxiety (take one tab 1 hour prior to MRI. OK to repeat x 1. do not drive on this med) for up to 1 day. 2 Tab 0   • HYDROcodone-acetaminophen (NORCO) 5-325 MG Tab per tablet Take 1 Tab by mouth every four hours as needed for up to 7 days. 40  Tab 0   • gabapentin (NEURONTIN) 300 MG Cap Take 300 mg by mouth 3 times a day.     • raNITidine (ZANTAC) 150 MG Tab TAKE ONE TABLET BY MOUTH TWICE DAILY FOR REFLUX DISEASE 180 Tab 3   • vitamin D (CHOLECALCIFEROL) 1000 UNIT Tab Take 1,000 Units by mouth every day.          Current Outpatient Prescriptions on File Prior to Visit   Medication Sig Dispense Refill   • HYDROcodone-acetaminophen (NORCO) 5-325 MG Tab per tablet Take 1 Tab by mouth every four hours as needed for up to 7 days. 40 Tab 0   • gabapentin (NEURONTIN) 300 MG Cap Take 300 mg by mouth 3 times a day.     • raNITidine (ZANTAC) 150 MG Tab TAKE ONE TABLET BY MOUTH TWICE DAILY FOR REFLUX DISEASE 180 Tab 3   • vitamin D (CHOLECALCIFEROL) 1000 UNIT Tab Take 1,000 Units by mouth every day.     • zolpidem (AMBIEN) 10 MG Tab Take 10 mg by mouth at bedtime as needed for Sleep.     • Butalbital-APAP-Caffeine (FIORICET PO) Take 10 mg by mouth.     • tizanidine (ZANAFLEX) 4 MG Tab Take 4 mg by mouth every 6 hours as needed.     • fluticasone (FLONASE) 50 MCG/ACT nasal spray Spray 1 Spray in nose every day. 1 Bottle 6     No current facility-administered medications on file prior to visit.          Allergies:   Allergies   Allergen Reactions   • Pantoprazole    • Pantoprazole Rash     itching       Social Hx:   Social History     Social History   • Marital status:      Spouse name: N/A   • Number of children: N/A   • Years of education: N/A     Occupational History   • Not on file.     Social History Main Topics   • Smoking status: Never Smoker   • Smokeless tobacco: Never Used      Comment: +second-hand exposure   • Alcohol use No      Comment: quit 4  years    • Drug use: No      Comment: quit marijuana use 7/3/18   • Sexual activity: Not Currently     Other Topics Concern   •  Service No   • Blood Transfusions No   • Caffeine Concern No   • Occupational Exposure No   • Hobby Hazards No   • Sleep Concern No   • Stress Concern No   • Weight  "Concern No   • Special Diet No   • Back Care Yes   • Exercise Yes   • Bike Helmet No   • Seat Belt Yes   • Self-Exams Yes     Social History Narrative    ** Merged History Encounter **              EXAMINATION     Physical Exam:   Vitals: /72 (BP Location: Right arm, Patient Position: Sitting, BP Cuff Size: Adult)   Pulse 81   Temp 37.2 °C (98.9 °F) (Temporal)   Ht 1.753 m (5' 9\")   Wt 67.9 kg (149 lb 11.1 oz)   SpO2 95%     Constitutional:   Body Habitus: Body mass index is 22.11 kg/m².  Cooperation: Fully cooperates with exam  Appearance: Well-groomed no disheveled    Respiratory-  breathing comfortable on room air, no audible wheezing  Cardiovascular- capillary refills less than 2 seconds. No lower extremity edema is noted.   Psychiatric- alert and oriented ×3. Normal affect.    MSK: -see previous notes          MEDICAL DECISION MAKING    DATA    Labs:   Lab Results   Component Value Date/Time    SODIUM 138 03/18/2019 07:23 AM    POTASSIUM 4.4 03/18/2019 07:23 AM    CHLORIDE 103 03/18/2019 07:23 AM    CO2 30 03/18/2019 07:23 AM    GLUCOSE 89 03/18/2019 07:23 AM    BUN 14 03/18/2019 07:23 AM    CREATININE 0.93 03/18/2019 07:23 AM        Lab Results   Component Value Date/Time    PROTHROMBTM 12.9 07/24/2018 08:45 AM    INR 1.00 07/24/2018 08:45 AM        Lab Results   Component Value Date/Time    WBC 5.1 07/24/2018 08:45 AM    RBC 4.49 (L) 07/24/2018 08:45 AM    HEMOGLOBIN 14.5 07/24/2018 08:45 AM    HEMATOCRIT 43.4 07/24/2018 08:45 AM    MCV 96.7 07/24/2018 08:45 AM    MCH 32.3 07/24/2018 08:45 AM    MCHC 33.4 (L) 07/24/2018 08:45 AM    MPV 10.1 07/24/2018 08:45 AM    NEUTSPOLYS 60.60 07/24/2018 08:45 AM    LYMPHOCYTES 25.00 07/24/2018 08:45 AM    MONOCYTES 11.20 07/24/2018 08:45 AM    EOSINOPHILS 2.20 07/24/2018 08:45 AM    BASOPHILS 0.80 07/24/2018 08:45 AM        No results found for: HBA1C       Imaging:   I personally reviewed following images    I reviewed the following radiology reports           "                                                                      Results for orders placed during the hospital encounter of 07/05/19   DX-LUMBAR SPINE-2 OR 3 VIEWS    Impression Mild dextroconvex scoliosis and moderate lumbar spondylosis.      Results for orders placed in visit on 04/02/15   DX-LUMBAR SPINE-4+ VIEWS    Impression Multilevel degenerative changes. No acute compression.                                         DIAGNOSIS   Visit Diagnoses     ICD-10-CM   1. Lumbar spondylosis M47.816   2. Acute right-sided low back pain with right-sided sciatica M54.41   3. History of right hip replacement Z96.641   4. Claustrophobia F40.240         ASSESSMENT and PLAN:     Vickey Turner 69 y.o. male      Vickey was seen today for follow-up.    Diagnoses and all orders for this visit:    Lumbar spondylosis    Acute right-sided low back pain with right-sided sciatica    History of right hip replacement    Claustrophobia  -     ALPRAZolam (XANAX) 1 MG Tab; Take 1 Tab by mouth as needed for Anxiety (take one tab 1 hour prior to MRI. OK to repeat x 1. do not drive on this med) for up to 1 day.        The patient has significant claustrophobia and is required medication in the past and even failed an MRI with oral medication.  I advised him to call and ask for the largest standard MRI.  I prefer a standard MRI as this is a larger magnet and better quality picture than an open MRI.  I prescribed the patient 2 tablets of Xanax as above.  I advised not to take this with his Norco and not to drink any alcohol use any other drugs while he is on Xanax.  We discussed the addictive potential however gave the patient 2 pills total so the addictive potential is very low.  He will need a ride home from the MRI as well and is not to drive when taking this medication.    We also discussed that if the patient is unable to get through the MRI with the medication as above and I would consider him for an MRI with IV sedation.   However prior to this he will likely need an EKG, chest x-ray and labs.    Follow up: after the MRI     Thank you for allowing me to participate in the care of this patient. If you have any questions please not hesitate to contact me.          Please note that this dictation was created using voice recognition software. I have made every reasonable attempt to correct obvious errors but there may be errors of grammar and content that I may have overlooked prior to finalization of this note.      Ernesto Dorado MD  Interventional Spine and Sports Physiatry  Physical Medicine and Rehabilitation  [facility]  7/25/2019  9:41 AM

## 2019-07-25 NOTE — Clinical Note
Dear Ryan Lopes, A.P.RBREONNA ,   Thank you for the referral of Vickey Turner.  Please see my note for more details    Should you have any questions or concerns please do not hesitate to contact me.  Ernesto Dorado M.D. 2

## 2019-08-01 ENCOUNTER — HOSPITAL ENCOUNTER (OUTPATIENT)
Dept: RADIOLOGY | Facility: MEDICAL CENTER | Age: 69
End: 2019-08-01
Attending: PHYSICAL MEDICINE & REHABILITATION
Payer: MEDICARE

## 2019-08-01 DIAGNOSIS — G89.29 CHRONIC RIGHT-SIDED LOW BACK PAIN WITH RIGHT-SIDED SCIATICA: ICD-10-CM

## 2019-08-01 DIAGNOSIS — M54.41 ACUTE RIGHT-SIDED LOW BACK PAIN WITH RIGHT-SIDED SCIATICA: ICD-10-CM

## 2019-08-01 DIAGNOSIS — M47.816 LUMBAR SPONDYLOSIS: ICD-10-CM

## 2019-08-01 DIAGNOSIS — M54.41 CHRONIC RIGHT-SIDED LOW BACK PAIN WITH RIGHT-SIDED SCIATICA: ICD-10-CM

## 2019-08-01 PROCEDURE — 72148 MRI LUMBAR SPINE W/O DYE: CPT

## 2019-08-08 ENCOUNTER — OFFICE VISIT (OUTPATIENT)
Dept: PHYSICAL MEDICINE AND REHAB | Facility: MEDICAL CENTER | Age: 69
End: 2019-08-08
Payer: MEDICARE

## 2019-08-08 VITALS
TEMPERATURE: 99.2 F | BODY MASS INDEX: 21.78 KG/M2 | OXYGEN SATURATION: 94 % | SYSTOLIC BLOOD PRESSURE: 132 MMHG | HEIGHT: 69 IN | DIASTOLIC BLOOD PRESSURE: 62 MMHG | HEART RATE: 88 BPM | WEIGHT: 147.05 LBS

## 2019-08-08 DIAGNOSIS — Z96.641 HISTORY OF RIGHT HIP REPLACEMENT: ICD-10-CM

## 2019-08-08 DIAGNOSIS — M54.50 CHRONIC RIGHT-SIDED LOW BACK PAIN WITHOUT SCIATICA: ICD-10-CM

## 2019-08-08 DIAGNOSIS — M54.16 LUMBAR RADICULOPATHY: ICD-10-CM

## 2019-08-08 DIAGNOSIS — G89.29 CHRONIC RIGHT-SIDED LOW BACK PAIN WITHOUT SCIATICA: ICD-10-CM

## 2019-08-08 DIAGNOSIS — M47.816 LUMBAR SPONDYLOSIS: ICD-10-CM

## 2019-08-08 DIAGNOSIS — M54.50 ACUTE RIGHT-SIDED LOW BACK PAIN WITHOUT SCIATICA: ICD-10-CM

## 2019-08-08 PROCEDURE — 99358 PROLONG SERVICE W/O CONTACT: CPT | Performed by: PHYSICAL MEDICINE & REHABILITATION

## 2019-08-08 PROCEDURE — 99214 OFFICE O/P EST MOD 30 MIN: CPT | Performed by: PHYSICAL MEDICINE & REHABILITATION

## 2019-08-08 RX ORDER — HYDROCODONE BITARTRATE AND ACETAMINOPHEN 5; 325 MG/1; MG/1
1-2 TABLET ORAL EVERY 4 HOURS PRN
COMMUNITY
End: 2019-09-11

## 2019-08-08 ASSESSMENT — PATIENT HEALTH QUESTIONNAIRE - PHQ9: CLINICAL INTERPRETATION OF PHQ2 SCORE: 0

## 2019-08-08 ASSESSMENT — PAIN SCALES - GENERAL: PAINLEVEL: 4=SLIGHT-MODERATE PAIN

## 2019-08-08 NOTE — PATIENT INSTRUCTIONS
Your procedure will be at the [facility] special procedure suite.    [facility] [place] [address]       PRE-PROCEDURE INSTRUCTIONS  You may take your regular medications except:   · No Anti-inflammatories 5 days prior to your procedure. Anti-inflammatories include medicines such as  ibuprofen (Motrin, Advil), Excedrin, Naproxen (Aleve, Anaprox, Naprelan, Naprosyn), Celecoxib (Celebrex), Diclofenac (Voltaren-XR tab), and Meloxicam (Mobic).   · No Glucophage or Metformin 24 hours before your procedure. You may resume next day after your procedure.  · Call the physiatry office if you are taking or prescribed anti-biotics within five days of procedure.  · Please ask provider if you are taking any new diabetes medication.  · CONTINUE TAKING BLOOD PRESSURE MEDICATIONS AS PRESCRIBED.  · Pain medications will not be prescribed on the procedure day. Procedural pain medication may be used by your provider   · Call your doctor's office performing the procedure if you have a fever, chills, rash or new illness prior to your procedure    Anticoagulation/antiplatelet medications  No Blood thinning medications such as Coumadin or Plavix 5 days prior to procedure unless your doctor said to continue these medications. Call your doctor if a new medication is prescribed in this class.     Restrictions for eating before procedure:   · If you are getting procedural sedation, then do not eat to for 8 hours prior to procedure appointment time. Do not drink fluids for four hours prior to your procedure time.   · If you are not having procedural sedation, then Skip the meal prior to your procedure. If you have a morning procedure then skip breakfast. If you have an afternoon procedure then skip lunch.   · You may drink clear liquids up to 2 hours prior to your procedure  · You must have a  the day of procedure to accompany you home.      POST PROCEDURE INSTRUCTIONS   · No heavy lifting, strenuous bending or  strenuous exercise for 3 days after your procedure.  · No hot tubs, baths, swimming for 3 days after your procedure  · You can remove the bandage the day after the procedure.  · IF YOU RECEIVED A DIAGNOSTIC PROCEDURE (SUCH AS A MEDIAL BRANCH BLOCK), PLEASE NOTE THAT WE DO EXPECT THIS INJECTION TO WEAR OFF.  IT IS IMPORTANT TO COMPLETE THE PAIN DIARY AND LIST THE PAIN SCORE ONLY FOR THE REGION WHERE THE PROCEDURE WAS AND BRING THIS TO YOUR FOLLOW UP VISIT.  · IF YOU EXPERIENCE PROLONGED WEAKNESS LONGER THAN ONE DAY, BOWEL OR BLADDER INCONTINENCE THEN PLEASE CALL THE PHYSIATRY OFFICE.  · Your leg may feel heavy, weak and numb for up to 1-2 days. Be very careful walking.   ·  You may resume normal activities 3 days after procedure.

## 2019-08-08 NOTE — PROGRESS NOTES
Prolonged non-face-to-face time was spent on 8/8/2019  from 1210 to 1250 by this reviewer.    An extensive record review was done which relates to the care of this patient. I reviewed 42 pages of data from [facility]    The patient was seen at [facility] by multiple physicians including Dr. Meliton De Paz.  Dr. De Paz performed a right hip total arthroplasty on 8/7/2018.  The patient was followed up after that as well.  Does have arthritis in the left hip but reportedly is mild to moderate.  Hardware appears in place in the right hip.  Patient also started going to physical therapy for his hip as well as his back.  Initially after the procedure the patient had significant amount of pain and was on oxycodone.  Prior to the procedure he had severe bone-on-bone arthritis in the hip.  He was also given meloxicam.  Reportedly the patient was previously a heavy alcohol drinker for 34 years but quit 4 years ago.  Reportedly the patient did well after the procedure and after physical therapy in regards to his hip.

## 2019-08-08 NOTE — PROGRESS NOTES
Follow up patient note  Interventional spine and sports physiatry, Physical medicine rehabilitation      Chief complaint:   Chief Complaint   Patient presents with   • Follow-Up     Lumbar spondylosis          HISTORY    Please see new patient note dated  by Dr Dorado,  for more details.     HPI  Patient identification: Vickey Turner 69 y.o. male  With Diagnoses of Lumbar spondylosis, Lumbar radiculopathy, History of right hip replacement, Chronic right-sided low back pain without sciatica, and Acute right-sided low back pain without sciatica were pertinent to this visit.     The patient has been having intermittent right-sided low back pain, 5-7/10 in intensity worse with lumbar extension and extension rotation.  The pain was previously radiating down the legs worse on the right side with associated numbness however the numbness and radiating pain has improved significantly only he no longer has this pain.  This is been improving with his home exercise program and stretching routine.  However the back pain still persists.  This is causing functional deficits and is not able to play golf, go on hikes or serve because of the pain.       ROS Red Flags :   Fever, Chills, Sweats: Denies  Involuntary Weight Loss: Denies  Bowel/Bladder Incontinence: Denies  Saddle Anesthesia: Denies        PMHx:   Past Medical History:   Diagnosis Date   • Chronic back pain    • Diverticulitis    • GERD (gastroesophageal reflux disease)        PSHx:   Past Surgical History:   Procedure Laterality Date   • COLONOSCOPY - ENDO  11/17/2014    Performed by Moris Stanton D.O. at ENDOSCOPY [facility]S       Family history   Family History   Problem Relation Age of Onset   • Dementia Mother    • Heart Disease Father          Medications:   Outpatient Medications Marked as Taking for the 8/8/19 encounter (Office Visit) with Ernesto Dorado M.D.   Medication Sig Dispense Refill   • HYDROcodone-acetaminophen (NORCO) 5-325 MG Tab  per tablet Take 1-2 Tabs by mouth every four hours as needed.     • zolpidem (AMBIEN) 10 MG Tab Take 10 mg by mouth at bedtime as needed for Sleep.     • Butalbital-APAP-Caffeine (FIORICET PO) Take 10 mg by mouth.     • tizanidine (ZANAFLEX) 4 MG Tab Take 4 mg by mouth every 6 hours as needed.     • gabapentin (NEURONTIN) 300 MG Cap Take 300 mg by mouth 3 times a day.     • fluticasone (FLONASE) 50 MCG/ACT nasal spray Spray 1 Spray in nose every day. 1 Bottle 6   • raNITidine (ZANTAC) 150 MG Tab TAKE ONE TABLET BY MOUTH TWICE DAILY FOR REFLUX DISEASE 180 Tab 3   • vitamin D (CHOLECALCIFEROL) 1000 UNIT Tab Take 1,000 Units by mouth every day.          Current Outpatient Medications on File Prior to Visit   Medication Sig Dispense Refill   • HYDROcodone-acetaminophen (NORCO) 5-325 MG Tab per tablet Take 1-2 Tabs by mouth every four hours as needed.     • zolpidem (AMBIEN) 10 MG Tab Take 10 mg by mouth at bedtime as needed for Sleep.     • Butalbital-APAP-Caffeine (FIORICET PO) Take 10 mg by mouth.     • tizanidine (ZANAFLEX) 4 MG Tab Take 4 mg by mouth every 6 hours as needed.     • gabapentin (NEURONTIN) 300 MG Cap Take 300 mg by mouth 3 times a day.     • fluticasone (FLONASE) 50 MCG/ACT nasal spray Spray 1 Spray in nose every day. 1 Bottle 6   • raNITidine (ZANTAC) 150 MG Tab TAKE ONE TABLET BY MOUTH TWICE DAILY FOR REFLUX DISEASE 180 Tab 3   • vitamin D (CHOLECALCIFEROL) 1000 UNIT Tab Take 1,000 Units by mouth every day.       No current facility-administered medications on file prior to visit.          Allergies:   Allergies   Allergen Reactions   • Pantoprazole    • Pantoprazole Rash     itching       Social Hx:   Social History     Socioeconomic History   • Marital status:      Spouse name: Not on file   • Number of children: Not on file   • Years of education: Not on file   • Highest education level: Not on file   Occupational History   • Not on file   Social Needs   • Financial resource strain: Not on  "file   • Food insecurity:     Worry: Not on file     Inability: Not on file   • Transportation needs:     Medical: Not on file     Non-medical: Not on file   Tobacco Use   • Smoking status: Never Smoker   • Smokeless tobacco: Never Used   • Tobacco comment: +second-hand exposure   Substance and Sexual Activity   • Alcohol use: No     Alcohol/week: 0.0 oz     Types: 30 - 40 Shots of liquor per week     Comment: quit 4  years    • Drug use: No     Comment: quit marijuana use 7/3/18   • Sexual activity: Not Currently   Lifestyle   • Physical activity:     Days per week: Not on file     Minutes per session: Not on file   • Stress: Not on file   Relationships   • Social connections:     Talks on phone: Not on file     Gets together: Not on file     Attends Buddhism service: Not on file     Active member of club or organization: Not on file     Attends meetings of clubs or organizations: Not on file     Relationship status: Not on file   • Intimate partner violence:     Fear of current or ex partner: Not on file     Emotionally abused: Not on file     Physically abused: Not on file     Forced sexual activity: Not on file   Other Topics Concern   •  Service No   • Blood Transfusions No   • Caffeine Concern No   • Occupational Exposure No   • Hobby Hazards No   • Sleep Concern No   • Stress Concern No   • Weight Concern No   • Special Diet No   • Back Care Yes   • Exercise Yes   • Bike Helmet No   • Seat Belt Yes   • Self-Exams Yes   Social History Narrative    ** Merged History Encounter **              EXAMINATION     Physical Exam:   Vitals: /62 (BP Location: Left arm, Patient Position: Sitting, BP Cuff Size: Adult)   Pulse 88   Temp 37.3 °C (99.2 °F) (Temporal)   Ht 1.753 m (5' 9\")   Wt 66.7 kg (147 lb 0.8 oz)   SpO2 94%     Constitutional:   Body Habitus: Body mass index is 21.72 kg/m².  Cooperation: Fully cooperates with exam  Appearance: Well-groomed no disheveled    Respiratory-  breathing " comfortable on room air, no audible wheezing  Cardiovascular- capillary refills less than 2 seconds. No lower extremity edema is noted.   Psychiatric- alert and oriented ×3. Normal affect.    MSK: -Extension rotation maneuver is positive on the right, negative on the left.  Positive tenderness palpation of the right lower lumbar facets.  Full range of motion lumbar flexion.  Straight leg raise is negative bilaterally.  Slump test is negative bilaterally.  Sensation is intact in the bilateral lower extremities.  Strength is 5/5 in the bilateral lower extremities.          MEDICAL DECISION MAKING    DATA    Labs:   Lab Results   Component Value Date/Time    SODIUM 138 03/18/2019 07:23 AM    POTASSIUM 4.4 03/18/2019 07:23 AM    CHLORIDE 103 03/18/2019 07:23 AM    CO2 30 03/18/2019 07:23 AM    GLUCOSE 89 03/18/2019 07:23 AM    BUN 14 03/18/2019 07:23 AM    CREATININE 0.93 03/18/2019 07:23 AM        Lab Results   Component Value Date/Time    PROTHROMBTM 12.9 07/24/2018 08:45 AM    INR 1.00 07/24/2018 08:45 AM        Lab Results   Component Value Date/Time    WBC 5.1 07/24/2018 08:45 AM    RBC 4.49 (L) 07/24/2018 08:45 AM    HEMOGLOBIN 14.5 07/24/2018 08:45 AM    HEMATOCRIT 43.4 07/24/2018 08:45 AM    MCV 96.7 07/24/2018 08:45 AM    MCH 32.3 07/24/2018 08:45 AM    MCHC 33.4 (L) 07/24/2018 08:45 AM    MPV 10.1 07/24/2018 08:45 AM    NEUTSPOLYS 60.60 07/24/2018 08:45 AM    LYMPHOCYTES 25.00 07/24/2018 08:45 AM    MONOCYTES 11.20 07/24/2018 08:45 AM    EOSINOPHILS 2.20 07/24/2018 08:45 AM    BASOPHILS 0.80 07/24/2018 08:45 AM        No results found for: HBA1C       Imaging:   I personally reviewed following images  MRI lumbar spine 8/1/2019.  Degenerative disc disease worse L5-S1.  Moderate right neuroforaminal stenosis L5-S1.  There is a disc bulge at L5-S1 which is right paracentric and best seen on the axial views including series 701 image 16.  This abuts the L5 and S1 nerve roots.  There is facet arthropathy  bilaterally worse at L3-4,  L4-5 and L5-S1.    I reviewed the following radiology reports                                                                                Results for orders placed during the hospital encounter of 07/05/19   DX-LUMBAR SPINE-2 OR 3 VIEWS    Impression Mild dextroconvex scoliosis and moderate lumbar spondylosis.      Results for orders placed in visit on 04/02/15   DX-LUMBAR SPINE-4+ VIEWS    Impression Multilevel degenerative changes. No acute compression.                                                                              Results for orders placed during the hospital encounter of 08/01/19   MR-LUMBAR SPINE-W/O    Impression 1.  Multilevel degenerative disc disease and facet arthropathy as described. No discrete disc herniation or isolated nerve root compression.                         DIAGNOSIS   Visit Diagnoses     ICD-10-CM   1. Lumbar spondylosis M47.816   2. Lumbar radiculopathy M54.16   3. History of right hip replacement Z96.641   4. Chronic right-sided low back pain without sciatica M54.5    G89.29   5. Acute right-sided low back pain without sciatica M54.5         ASSESSMENT and PLAN:     Vickey Turner 69 y.o. male      Vickey was seen today for follow-up.    Diagnoses and all orders for this visit:    Lumbar spondylosis  -     REFERRAL TO PHYSICIAL MEDICINE REHAB    Lumbar radiculopathy    History of right hip replacement    Chronic right-sided low back pain without sciatica    Acute right-sided low back pain without sciatica        I believe the patient's main symptoms are secondary to lumbar spondylosis worse on the right L3-4, L4-5, L5-S1 where he is having his symptoms which is consistent with his history and exam with functional deficits.  He is failed conservative treatments.  I scheduled the patient for diagnostic nerve blocks of the medial branches targeting the right L3-4, L4-5, L5-S1 facet joints.    The risks benefits and alternatives to this  procedure were discussed and the patient wishes to proceed with the procedure. Risks include but are not limited to damage to surrounding structures, infection, bleeding, worsening of pain which can be permanent, weakness which can be permanent. Benefits include pain relief, improved function. Alternatives includes not doing the procedure.        He has no radicular component to his pain I do not believe his lumbar radiculopathy is active at this time.  He responded very well to medial branch blocks on the left side in the past several years ago and continues to have pain relief.  I do not believe that his pain is generated from the right hip joint.    Follow up: after the above procedure    Thank you for allowing me to participate in the care of this patient. If you have any questions please not hesitate to contact me.          Please note that this dictation was created using voice recognition software. I have made every reasonable attempt to correct obvious errors but there may be errors of grammar and content that I may have overlooked prior to finalization of this note.      Ernesto Dorado MD  Interventional Spine and Sports Physiatry  Physical Medicine and Rehabilitation  [facility]  8/8/2019  9:33 AM

## 2019-08-09 ENCOUNTER — TELEPHONE (OUTPATIENT)
Dept: PHYSICAL MEDICINE AND REHAB | Facility: MEDICAL CENTER | Age: 69
End: 2019-08-09

## 2019-09-04 ENCOUNTER — APPOINTMENT (RX ONLY)
Dept: URBAN - METROPOLITAN AREA CLINIC 20 | Facility: CLINIC | Age: 69
Setting detail: DERMATOLOGY
End: 2019-09-04

## 2019-09-04 DIAGNOSIS — L82.1 OTHER SEBORRHEIC KERATOSIS: ICD-10-CM

## 2019-09-04 DIAGNOSIS — L81.7 PIGMENTED PURPURIC DERMATOSIS: ICD-10-CM

## 2019-09-04 DIAGNOSIS — D18.0 HEMANGIOMA: ICD-10-CM

## 2019-09-04 DIAGNOSIS — L81.4 OTHER MELANIN HYPERPIGMENTATION: ICD-10-CM

## 2019-09-04 DIAGNOSIS — L57.8 OTHER SKIN CHANGES DUE TO CHRONIC EXPOSURE TO NONIONIZING RADIATION: ICD-10-CM

## 2019-09-04 DIAGNOSIS — Z85.828 PERSONAL HISTORY OF OTHER MALIGNANT NEOPLASM OF SKIN: ICD-10-CM

## 2019-09-04 DIAGNOSIS — L57.0 ACTINIC KERATOSIS: ICD-10-CM

## 2019-09-04 DIAGNOSIS — D22 MELANOCYTIC NEVI: ICD-10-CM

## 2019-09-04 PROBLEM — D22.5 MELANOCYTIC NEVI OF TRUNK: Status: ACTIVE | Noted: 2019-09-04

## 2019-09-04 PROBLEM — D18.01 HEMANGIOMA OF SKIN AND SUBCUTANEOUS TISSUE: Status: ACTIVE | Noted: 2019-09-04

## 2019-09-04 PROCEDURE — 17003 DESTRUCT PREMALG LES 2-14: CPT

## 2019-09-04 PROCEDURE — ? ADDITIONAL NOTES

## 2019-09-04 PROCEDURE — ? COUNSELING

## 2019-09-04 PROCEDURE — 17000 DESTRUCT PREMALG LESION: CPT

## 2019-09-04 PROCEDURE — ? PRESCRIPTION

## 2019-09-04 PROCEDURE — ? LIQUID NITROGEN

## 2019-09-04 PROCEDURE — 99214 OFFICE O/P EST MOD 30 MIN: CPT | Mod: 25

## 2019-09-04 RX ORDER — TRIAMCINOLONE ACETONIDE 1 MG/G
CREAM TOPICAL BID
Qty: 1 | Refills: 3 | Status: ERX | COMMUNITY
Start: 2019-09-04

## 2019-09-04 RX ADMIN — TRIAMCINOLONE ACETONIDE: 1 CREAM TOPICAL at 00:00

## 2019-09-04 ASSESSMENT — LOCATION SIMPLE DESCRIPTION DERM
LOCATION SIMPLE: LEFT HAND
LOCATION SIMPLE: LEFT UPPER ARM
LOCATION SIMPLE: RIGHT PRETIBIAL REGION
LOCATION SIMPLE: LEFT CHEEK
LOCATION SIMPLE: LEFT PRETIBIAL REGION
LOCATION SIMPLE: RIGHT HAND
LOCATION SIMPLE: RIGHT UPPER ARM
LOCATION SIMPLE: RIGHT UPPER BACK
LOCATION SIMPLE: RIGHT SHOULDER
LOCATION SIMPLE: RIGHT CHEEK
LOCATION SIMPLE: LEFT POSTERIOR THIGH
LOCATION SIMPLE: LEFT LOWER BACK
LOCATION SIMPLE: LEFT UPPER BACK
LOCATION SIMPLE: RIGHT POSTERIOR THIGH
LOCATION SIMPLE: RIGHT ANTERIOR NECK
LOCATION SIMPLE: LEFT FOREARM

## 2019-09-04 ASSESSMENT — LOCATION DETAILED DESCRIPTION DERM
LOCATION DETAILED: RIGHT INFERIOR MEDIAL UPPER BACK
LOCATION DETAILED: LEFT CENTRAL MALAR CHEEK
LOCATION DETAILED: RIGHT SUPERIOR UPPER BACK
LOCATION DETAILED: RIGHT RADIAL DORSAL HAND
LOCATION DETAILED: LEFT RADIAL DORSAL HAND
LOCATION DETAILED: RIGHT DISTAL POSTERIOR UPPER ARM
LOCATION DETAILED: LEFT DISTAL DORSAL FOREARM
LOCATION DETAILED: LEFT DISTAL POSTERIOR UPPER ARM
LOCATION DETAILED: LEFT INFERIOR MEDIAL MIDBACK
LOCATION DETAILED: RIGHT POSTERIOR SHOULDER
LOCATION DETAILED: RIGHT INFERIOR CENTRAL MALAR CHEEK
LOCATION DETAILED: LEFT SUPERIOR UPPER BACK
LOCATION DETAILED: RIGHT PROXIMAL PRETIBIAL REGION
LOCATION DETAILED: LEFT ULNAR DORSAL HAND
LOCATION DETAILED: RIGHT DISTAL LATERAL POSTERIOR THIGH
LOCATION DETAILED: RIGHT INFERIOR ANTERIOR NECK
LOCATION DETAILED: LEFT DISTAL POSTERIOR THIGH
LOCATION DETAILED: LEFT PROXIMAL PRETIBIAL REGION

## 2019-09-04 ASSESSMENT — LOCATION ZONE DERM
LOCATION ZONE: NECK
LOCATION ZONE: FACE
LOCATION ZONE: ARM
LOCATION ZONE: HAND
LOCATION ZONE: TRUNK
LOCATION ZONE: LEG

## 2019-09-04 NOTE — PROCEDURE: ADDITIONAL NOTES
Additional Notes: Our clinic will start authorization process for red light treatment.
Detail Level: Simple

## 2019-09-11 ENCOUNTER — HOSPITAL ENCOUNTER (OUTPATIENT)
Dept: RADIOLOGY | Facility: REHABILITATION | Age: 69
End: 2019-09-11
Attending: PHYSICAL MEDICINE & REHABILITATION

## 2019-09-11 ENCOUNTER — HOSPITAL ENCOUNTER (OUTPATIENT)
Dept: PAIN MANAGEMENT | Facility: REHABILITATION | Age: 69
End: 2019-09-11
Attending: PHYSICAL MEDICINE & REHABILITATION
Payer: MEDICARE

## 2019-09-11 VITALS
RESPIRATION RATE: 18 BRPM | WEIGHT: 143.96 LBS | SYSTOLIC BLOOD PRESSURE: 121 MMHG | HEIGHT: 69 IN | DIASTOLIC BLOOD PRESSURE: 77 MMHG | TEMPERATURE: 99.4 F | HEART RATE: 110 BPM | BODY MASS INDEX: 21.32 KG/M2 | OXYGEN SATURATION: 96 %

## 2019-09-11 PROCEDURE — 700117 HCHG RX CONTRAST REV CODE 255

## 2019-09-11 PROCEDURE — 64493 INJ PARAVERT F JNT L/S 1 LEV: CPT

## 2019-09-11 PROCEDURE — 64495 INJ PARAVERT F JNT L/S 3 LEV: CPT

## 2019-09-11 PROCEDURE — 64494 INJ PARAVERT F JNT L/S 2 LEV: CPT

## 2019-09-11 PROCEDURE — 700111 HCHG RX REV CODE 636 W/ 250 OVERRIDE (IP)

## 2019-09-11 RX ORDER — LIDOCAINE HYDROCHLORIDE 10 MG/ML
INJECTION, SOLUTION EPIDURAL; INFILTRATION; INTRACAUDAL; PERINEURAL
Status: COMPLETED
Start: 2019-09-11 | End: 2019-09-11

## 2019-09-11 RX ADMIN — LIDOCAINE HYDROCHLORIDE 10 ML: 10 INJECTION, SOLUTION EPIDURAL; INFILTRATION; INTRACAUDAL; PERINEURAL at 13:49

## 2019-09-11 RX ADMIN — IOHEXOL 2 ML: 240 INJECTION, SOLUTION INTRATHECAL; INTRAVASCULAR; INTRAVENOUS; ORAL at 13:54

## 2019-09-11 NOTE — NON-PROVIDER
Lidocaine 2% (4 ml) given via injection by  at 1355. Unable to document this information in MAR at this time.

## 2019-09-11 NOTE — PROCEDURES
Date of Service: 9/11/2019     Patient: Vickey Turner 69 y.o. male     MRN: [id number]     Physician/s: Ernesto Dorado MD    Pre-operative Diagnosis: Lumbosacral spondylosis, facet arthropathy    Post-operative Diagnosis: Lumbosacral spondylosis, facet arthropathy    Procedure: Medial Branch Blocks targeting the right L3-4, L4-5 and L5-S1  facet joints     Description of procedure:    The risks, benefits, and alternatives of the procedure were reviewed and discussed with the patient.  Written informed consent was freely obtained. A pre-procedural time-out was conducted by the physician verifying patient’s identity, procedure to be performed, procedure site and side, and allergy verification. Appropriate equipment was determined to be in place for the procedure.     In the fluoroscopy suite the patient was placed in a prone position and the skin was prepped and draped in the usual sterile fashion. The fluoroscope was placed over the lower back at the appropriate angles, and the targets for injection were marked. A 27g needle was placed into each of the markings at the levels below, and approx 1cc of 1% Lidocaine was injected subcutaneously into the epidermal and dermal layers. The needle was removed intact.      Using an oblique view, A 25g 3.5 inch needle was then placed at the intersection of the transverse process and superior articular process at the L3-4 and L4-5 on the right side. The needle tips were then verified by AP, oblique, and lateral views.     Using an oblique view, A 25g 3.5 inch needle was then placed at the intersection of the transverse process and superior articular process at the L5-S1 and S1 on the right side. The needle tips were then verified by AP, oblique, and lateral views.      In the AP view, less than 1 cc of contrast dye was used to highlight the medial branch while the fluoroscope was running live at the levels above. Following negative aspiration, 0.5cc of 2% lidocaine  was  then injected at the above levels, and the needles were removed intact after restyleted. The patient's back was covered with a 4x4 gauze, the area was cleansed with sterile normal saline, and a dressing was applied.     There were no complications noted, the patient was hemodynamically stable, and tolerated the procedure well.     The patient had 100% pain relief immediately post procedure.    Follow-up appointment is scheduled for 9/25/2019       Ernesto Dorado MD  Physical Medicine and Rehabilitation  Interventional Spine and Sports Physiatry  [facility]  9/11/2019  1:55 PM         CPT  Intraarticular joint or medial branch block (MBB) - lumbar or sacral (1st level):  54310  Intraarticular joint or medial branch block (MBB) - lumbar or sacral (2nd level):  07729  Intraarticular joint or medial branch block (MBB) - lumbar or sacral (3rd level):  77065

## 2019-09-11 NOTE — NON-PROVIDER
Current medications reviewed with pt, see medications reconciliation form. Pt scott taking ASA or other blood thinners or anti-inflammatories.  Pt has a ride post-procedure(Deniz to drive).  Printed and verbal discharge instructions given to pt who verbalized understanding.

## 2019-09-11 NOTE — H&P
Interventional spine and sports physiatry, Physical medicine rehabilitation        Chief complaint:        Chief Complaint   Patient presents with   • Follow-Up       Lumbar spondylosis            HISTORY    Please see new patient note dated  by Dr Dorado,  for more details.      HPI  Patient identification: Vickey Turner 69 y.o. male  With Diagnoses of Lumbar spondylosis, Lumbar radiculopathy, History of right hip replacement, Chronic right-sided low back pain without sciatica, and Acute right-sided low back pain without sciatica were pertinent to this visit.      The patient has been having intermittent right-sided low back pain, 5-7/10 in intensity worse with lumbar extension and extension rotation.  The pain was previously radiating down the legs worse on the right side with associated numbness however the numbness and radiating pain has improved significantly only he no longer has this pain.  This is been improving with his home exercise program and stretching routine.  However the back pain still persists.  This is causing functional deficits and is not able to play golf, go on hikes or serve because of the pain.        ROS Red Flags :   Fever, Chills, Sweats: Denies  Involuntary Weight Loss: Denies  Bowel/Bladder Incontinence: Denies  Saddle Anesthesia: Denies          PMHx:   Past Medical History        Past Medical History:   Diagnosis Date   • Chronic back pain     • Diverticulitis     • GERD (gastroesophageal reflux disease)              PSHx:   Past Surgical History         Past Surgical History:   Procedure Laterality Date   • COLONOSCOPY - ENDO   11/17/2014     Performed by Moris Stanton D.O. at ENDOSCOPY [facility] ORS            Family history   Family History         Family History   Problem Relation Age of Onset   • Dementia Mother     • Heart Disease Father                 Medications:   Active Medications          Outpatient Medications Marked as Taking for the 8/8/19 encounter  (Office Visit) with Ernesto Dorado M.D.   Medication Sig Dispense Refill   • HYDROcodone-acetaminophen (NORCO) 5-325 MG Tab per tablet Take 1-2 Tabs by mouth every four hours as needed.       • zolpidem (AMBIEN) 10 MG Tab Take 10 mg by mouth at bedtime as needed for Sleep.       • Butalbital-APAP-Caffeine (FIORICET PO) Take 10 mg by mouth.       • tizanidine (ZANAFLEX) 4 MG Tab Take 4 mg by mouth every 6 hours as needed.       • gabapentin (NEURONTIN) 300 MG Cap Take 300 mg by mouth 3 times a day.       • fluticasone (FLONASE) 50 MCG/ACT nasal spray Spray 1 Spray in nose every day. 1 Bottle 6   • raNITidine (ZANTAC) 150 MG Tab TAKE ONE TABLET BY MOUTH TWICE DAILY FOR REFLUX DISEASE 180 Tab 3   • vitamin D (CHOLECALCIFEROL) 1000 UNIT Tab Take 1,000 Units by mouth every day.                       Current Outpatient Medications on File Prior to Visit   Medication Sig Dispense Refill   • HYDROcodone-acetaminophen (NORCO) 5-325 MG Tab per tablet Take 1-2 Tabs by mouth every four hours as needed.       • zolpidem (AMBIEN) 10 MG Tab Take 10 mg by mouth at bedtime as needed for Sleep.       • Butalbital-APAP-Caffeine (FIORICET PO) Take 10 mg by mouth.       • tizanidine (ZANAFLEX) 4 MG Tab Take 4 mg by mouth every 6 hours as needed.       • gabapentin (NEURONTIN) 300 MG Cap Take 300 mg by mouth 3 times a day.       • fluticasone (FLONASE) 50 MCG/ACT nasal spray Spray 1 Spray in nose every day. 1 Bottle 6   • raNITidine (ZANTAC) 150 MG Tab TAKE ONE TABLET BY MOUTH TWICE DAILY FOR REFLUX DISEASE 180 Tab 3   • vitamin D (CHOLECALCIFEROL) 1000 UNIT Tab Take 1,000 Units by mouth every day.          No current facility-administered medications on file prior to visit.             Allergies:         Allergies   Allergen Reactions   • Pantoprazole     • Pantoprazole Rash       itching         Social Hx:   Social History               Socioeconomic History   • Marital status:        Spouse name: Not on file   • Number of  children: Not on file   • Years of education: Not on file   • Highest education level: Not on file   Occupational History   • Not on file   Social Needs   • Financial resource strain: Not on file   • Food insecurity:       Worry: Not on file       Inability: Not on file   • Transportation needs:       Medical: Not on file       Non-medical: Not on file   Tobacco Use   • Smoking status: Never Smoker   • Smokeless tobacco: Never Used   • Tobacco comment: +second-hand exposure   Substance and Sexual Activity   • Alcohol use: No       Alcohol/week: 0.0 oz       Types: 30 - 40 Shots of liquor per week       Comment: quit 4  years    • Drug use: No       Comment: quit marijuana use 7/3/18   • Sexual activity: Not Currently   Lifestyle   • Physical activity:       Days per week: Not on file       Minutes per session: Not on file   • Stress: Not on file   Relationships   • Social connections:       Talks on phone: Not on file       Gets together: Not on file       Attends Yazidism service: Not on file       Active member of club or organization: Not on file       Attends meetings of clubs or organizations: Not on file       Relationship status: Not on file   • Intimate partner violence:       Fear of current or ex partner: Not on file       Emotionally abused: Not on file       Physically abused: Not on file       Forced sexual activity: Not on file   Other Topics Concern   •  Service No   • Blood Transfusions No   • Caffeine Concern No   • Occupational Exposure No   • Hobby Hazards No   • Sleep Concern No   • Stress Concern No   • Weight Concern No   • Special Diet No   • Back Care Yes   • Exercise Yes   • Bike Helmet No   • Seat Belt Yes   • Self-Exams Yes   Social History Narrative     ** Merged History Encounter **                     EXAMINATION      Physical Exam:   Vitals: /62 (BP Location: Left arm, Patient Position: Sitting, BP Cuff Size: Adult)   Pulse 88   Temp 37.3 °C (99.2 °F) (Temporal)   Ht  "1.753 m (5' 9\")   Wt 66.7 kg (147 lb 0.8 oz)   SpO2 94%      Constitutional:   Body Habitus: Body mass index is 21.72 kg/m².  Cooperation: Fully cooperates with exam  Appearance: Well-groomed no disheveled     Respiratory-  breathing comfortable on room air, no audible wheezing  Cardiovascular- capillary refills less than 2 seconds. No lower extremity edema is noted.   Psychiatric- alert and oriented ×3. Normal affect.    MSK: -Extension rotation maneuver is positive on the right, negative on the left.  Positive tenderness palpation of the right lower lumbar facets.  Full range of motion lumbar flexion.  Straight leg raise is negative bilaterally.  Slump test is negative bilaterally.  Sensation is intact in the bilateral lower extremities.  Strength is 5/5 in the bilateral lower extremities.              MEDICAL DECISION MAKING     DATA     Labs:         Lab Results   Component Value Date/Time     SODIUM 138 03/18/2019 07:23 AM     POTASSIUM 4.4 03/18/2019 07:23 AM     CHLORIDE 103 03/18/2019 07:23 AM     CO2 30 03/18/2019 07:23 AM     GLUCOSE 89 03/18/2019 07:23 AM     BUN 14 03/18/2019 07:23 AM     CREATININE 0.93 03/18/2019 07:23 AM               Lab Results   Component Value Date/Time     PROTHROMBTM 12.9 07/24/2018 08:45 AM     INR 1.00 07/24/2018 08:45 AM         Lab Results   Component Value Date/Time     WBC 5.1 07/24/2018 08:45 AM     RBC 4.49 (L) 07/24/2018 08:45 AM     HEMOGLOBIN 14.5 07/24/2018 08:45 AM     HEMATOCRIT 43.4 07/24/2018 08:45 AM     MCV 96.7 07/24/2018 08:45 AM     MCH 32.3 07/24/2018 08:45 AM     MCHC 33.4 (L) 07/24/2018 08:45 AM     MPV 10.1 07/24/2018 08:45 AM     NEUTSPOLYS 60.60 07/24/2018 08:45 AM     LYMPHOCYTES 25.00 07/24/2018 08:45 AM     MONOCYTES 11.20 07/24/2018 08:45 AM     EOSINOPHILS 2.20 07/24/2018 08:45 AM     BASOPHILS 0.80 07/24/2018 08:45 AM         No results found for: HBA1C         Imaging:   I personally reviewed following images  MRI lumbar spine 8/1/2019.  " Degenerative disc disease worse L5-S1.  Moderate right neuroforaminal stenosis L5-S1.  There is a disc bulge at L5-S1 which is right paracentric and best seen on the axial views including series 701 image 16.  This abuts the L5 and S1 nerve roots.  There is facet arthropathy bilaterally worse at L3-4,  L4-5 and L5-S1.     I reviewed the following radiology reports                                                                                     Results for orders placed during the hospital encounter of 07/05/19   DX-LUMBAR SPINE-2 OR 3 VIEWS     Impression Mild dextroconvex scoliosis and moderate lumbar spondylosis.           Results for orders placed in visit on 04/02/15   DX-LUMBAR SPINE-4+ VIEWS     Impression Multilevel degenerative changes. No acute compression.                                                                                   Results for orders placed during the hospital encounter of 08/01/19   MR-LUMBAR SPINE-W/O     Impression 1.  Multilevel degenerative disc disease and facet arthropathy as described. No discrete disc herniation or isolated nerve root compression.                          DIAGNOSIS        Visit Diagnoses       ICD-10-CM   1. Lumbar spondylosis M47.816   2. Lumbar radiculopathy M54.16   3. History of right hip replacement Z96.641   4. Chronic right-sided low back pain without sciatica M54.5     G89.29   5. Acute right-sided low back pain without sciatica M54.5            ASSESSMENT and PLAN:     Vickey Turner 69 y.o. male       Vickey was seen today for follow-up.     Diagnoses and all orders for this visit:     Lumbar spondylosis  -     REFERRAL TO PHYSICIAL MEDICINE REHAB     Lumbar radiculopathy     History of right hip replacement     Chronic right-sided low back pain without sciatica     Acute right-sided low back pain without sciatica           I believe the patient's main symptoms are secondary to lumbar spondylosis worse on the right L3-4, L4-5, L5-S1 where he  is having his symptoms which is consistent with his history and exam with functional deficits.  He is failed conservative treatments.  I scheduled the patient for diagnostic nerve blocks of the medial branches targeting the right L3-4, L4-5, L5-S1 facet joints.     The risks benefits and alternatives to this procedure were discussed and the patient wishes to proceed with the procedure. Risks include but are not limited to damage to surrounding structures, infection, bleeding, worsening of pain which can be permanent, weakness which can be permanent. Benefits include pain relief, improved function. Alternatives includes not doing the procedure.          He has no radicular component to his pain I do not believe his lumbar radiculopathy is active at this time.  He responded very well to medial branch blocks on the left side in the past several years ago and continues to have pain relief.  I do not believe that his pain is generated from the right hip joint.     Follow up: after the above procedure     Thank you for allowing me to participate in the care of this patient. If you have any questions please not hesitate to contact me.              Please note that this dictation was created using voice recognition software. I have made every reasonable attempt to correct obvious errors but there may be errors of grammar and content that I may have overlooked prior to finalization of this note.        Ernesto Dorado MD  Interventional Spine and Sports Physiatry  Physical Medicine and Rehabilitation  [facility]       ADDENDUM     No significant changes. Patient is not on new anticoagulation, antibiotics, antiplatelet drugs. Still with right low back  pain          Gen: NAD  Resp: Clear to auscultation bilaterally, no wheezing. Breathing comfortable on room air.  Cardiovascular: Regular rate and rhythm, no murmurs rubs or gallops.       PMHx:   Past Medical History:   Diagnosis Date   • Chronic back pain    •  Diverticulitis    • GERD (gastroesophageal reflux disease)        PSHx:   Past Surgical History:   Procedure Laterality Date   • COLONOSCOPY - ENDO  11/17/2014    Performed by Moris Stanton D.O. at ENDOSCOPY [facility] ORS       Family history   Family History   Problem Relation Age of Onset   • Dementia Mother    • Heart Disease Father          Medications:   Current Outpatient Medications   Medication   • zolpidem (AMBIEN) 10 MG Tab   • Butalbital-APAP-Caffeine (FIORICET PO)   • tizanidine (ZANAFLEX) 4 MG Tab   • fluticasone (FLONASE) 50 MCG/ACT nasal spray   • raNITidine (ZANTAC) 150 MG Tab   • vitamin D (CHOLECALCIFEROL) 1000 UNIT Tab     No current facility-administered medications for this encounter.        Allergies:   Allergies   Allergen Reactions   • Pantoprazole    • Pantoprazole Rash     itching       Social Hx:   Social History     Socioeconomic History   • Marital status:      Spouse name: Not on file   • Number of children: Not on file   • Years of education: Not on file   • Highest education level: Not on file   Occupational History   • Not on file   Social Needs   • Financial resource strain: Not on file   • Food insecurity:     Worry: Not on file     Inability: Not on file   • Transportation needs:     Medical: Not on file     Non-medical: Not on file   Tobacco Use   • Smoking status: Never Smoker   • Smokeless tobacco: Never Used   • Tobacco comment: +second-hand exposure   Substance and Sexual Activity   • Alcohol use: No     Alcohol/week: 0.0 oz     Types: 30 - 40 Shots of liquor per week     Comment: quit 4  years    • Drug use: No     Comment: quit marijuana use 7/3/18   • Sexual activity: Not Currently   Lifestyle   • Physical activity:     Days per week: Not on file     Minutes per session: Not on file   • Stress: Not on file   Relationships   • Social connections:     Talks on phone: Not on file     Gets together: Not on file     Attends Confucianism service: Not on file      Active member of club or organization: Not on file     Attends meetings of clubs or organizations: Not on file     Relationship status: Not on file   • Intimate partner violence:     Fear of current or ex partner: Not on file     Emotionally abused: Not on file     Physically abused: Not on file     Forced sexual activity: Not on file   Other Topics Concern   •  Service No   • Blood Transfusions No   • Caffeine Concern No   • Occupational Exposure No   • Hobby Hazards No   • Sleep Concern No   • Stress Concern No   • Weight Concern No   • Special Diet No   • Back Care Yes   • Exercise Yes   • Bike Helmet No   • Seat Belt Yes   • Self-Exams Yes   Social History Narrative    ** Merged History Encounter **               Okay to proceed with Medial branch blocks targeting the RIGHT L3-4, L4-5 and L5-S1 facet joints with fluoroscopic guidance     Ernesto Dorado MD  Physical Medicine and Rehabilitation  Interventional Spine and Sports Physiatry  [facility]  9/11/2019  1:03 PM

## 2019-09-17 ENCOUNTER — OFFICE VISIT (OUTPATIENT)
Dept: PHYSICAL MEDICINE AND REHAB | Facility: MEDICAL CENTER | Age: 69
End: 2019-09-17
Payer: MEDICARE

## 2019-09-17 ENCOUNTER — NON-PROVIDER VISIT (OUTPATIENT)
Dept: MEDICAL GROUP | Facility: PHYSICIAN GROUP | Age: 69
End: 2019-09-17
Payer: MEDICARE

## 2019-09-17 VITALS
OXYGEN SATURATION: 95 % | DIASTOLIC BLOOD PRESSURE: 74 MMHG | TEMPERATURE: 97.7 F | HEART RATE: 74 BPM | SYSTOLIC BLOOD PRESSURE: 122 MMHG | BODY MASS INDEX: 22.04 KG/M2 | HEIGHT: 69 IN | WEIGHT: 148.81 LBS

## 2019-09-17 DIAGNOSIS — Z96.641 HISTORY OF RIGHT HIP REPLACEMENT: ICD-10-CM

## 2019-09-17 DIAGNOSIS — M54.50 CHRONIC RIGHT-SIDED LOW BACK PAIN WITHOUT SCIATICA: ICD-10-CM

## 2019-09-17 DIAGNOSIS — F51.01 PRIMARY INSOMNIA: ICD-10-CM

## 2019-09-17 DIAGNOSIS — M47.816 LUMBAR SPONDYLOSIS: ICD-10-CM

## 2019-09-17 DIAGNOSIS — F10.21 ALCOHOL DEPENDENCE IN REMISSION (HCC): ICD-10-CM

## 2019-09-17 DIAGNOSIS — M54.16 LUMBAR RADICULOPATHY: ICD-10-CM

## 2019-09-17 DIAGNOSIS — Z23 NEED FOR VACCINATION: ICD-10-CM

## 2019-09-17 DIAGNOSIS — G89.29 CHRONIC RIGHT-SIDED LOW BACK PAIN WITHOUT SCIATICA: ICD-10-CM

## 2019-09-17 DIAGNOSIS — K21.9 GASTROESOPHAGEAL REFLUX DISEASE, ESOPHAGITIS PRESENCE NOT SPECIFIED: ICD-10-CM

## 2019-09-17 DIAGNOSIS — K70.9 ALCOHOLIC LIVER DISEASE, UNSPECIFIED (HCC): ICD-10-CM

## 2019-09-17 DIAGNOSIS — M62.838 MUSCLE SPASM: ICD-10-CM

## 2019-09-17 PROCEDURE — 90662 IIV NO PRSV INCREASED AG IM: CPT | Performed by: FAMILY MEDICINE

## 2019-09-17 PROCEDURE — G0008 ADMIN INFLUENZA VIRUS VAC: HCPCS | Performed by: FAMILY MEDICINE

## 2019-09-17 PROCEDURE — 99214 OFFICE O/P EST MOD 30 MIN: CPT | Performed by: PHYSICAL MEDICINE & REHABILITATION

## 2019-09-17 RX ORDER — TIZANIDINE 4 MG/1
4 TABLET ORAL NIGHTLY PRN
Qty: 30 TAB | Refills: 3 | Status: SHIPPED | OUTPATIENT
Start: 2019-09-17 | End: 2020-09-08

## 2019-09-17 RX ORDER — ALPRAZOLAM 1 MG/1
TABLET ORAL
Refills: 0 | COMMUNITY
Start: 2019-07-26 | End: 2019-09-17

## 2019-09-17 ASSESSMENT — PATIENT HEALTH QUESTIONNAIRE - PHQ9
7. TROUBLE CONCENTRATING ON THINGS, SUCH AS READING THE NEWSPAPER OR WATCHING TELEVISION: 0
1. LITTLE INTEREST OR PLEASURE IN DOING THINGS: 0
SUM OF ALL RESPONSES TO PHQ QUESTIONS 1-9: 1
8. MOVING OR SPEAKING SO SLOWLY THAT OTHER PEOPLE COULD HAVE NOTICED. OR THE OPPOSITE, BEING SO FIGETY OR RESTLESS THAT YOU HAVE BEEN MOVING AROUND A LOT MORE THAN USUAL: 0
5. POOR APPETITE OR OVEREATING: 0
SUM OF ALL RESPONSES TO PHQ9 QUESTIONS 1 AND 2: 0
3. TROUBLE FALLING OR STAYING ASLEEP OR SLEEPING TOO MUCH: 1
2. FEELING DOWN, DEPRESSED, IRRITABLE, OR HOPELESS: 0
4. FEELING TIRED OR HAVING LITTLE ENERGY: 0
6. FEELING BAD ABOUT YOURSELF - OR THAT YOU ARE A FAILURE OR HAVE LET YOURSELF OR YOUR FAMILY DOWN: 0
9. THOUGHTS THAT YOU WOULD BE BETTER OFF DEAD, OR OF HURTING YOURSELF: 0

## 2019-09-17 ASSESSMENT — PAIN SCALES - GENERAL: PAINLEVEL: 6=MODERATE PAIN

## 2019-09-17 NOTE — TELEPHONE ENCOUNTER
Was the patient seen in the last year in this department? Yes    Does patient have an active prescription for medications requested? No     Received Request Via: Patient          RASHEED BETANCUR     Age: 69  demographics  Data as of: 09/17/2019              NARCOTIC 290    Created with Raphaël [id number]   SEDATIVE 230       STIMULANT 000       NARxSCORES can range from 000 to 999. The first two digits represent the composite percentile risk based on an overall analysis of prescription drug use. The third digit represents the number of active prescriptions. The distribution of scores in the population is such that approximately 75% fall below 200, 95% fall below 500 and 99% fall below 650. The information on this report is not warranted as accurate or complete. This report is based on the search criteria supplied and the data entered by the dispensing pharmacy. For more information about any prescription, please contact the dispensing pharmacy or the prescriber. NARxSCORES and Reports are intended to aid, not replace medical decision making. None of the information presented should be used as sole justification for providing or refusing to provide medications.       RX GRAPH Grayed out drugs could not be included in score calculations.   [x] Narcotic [x] Sedative [x] Stimulant [x] Buprenorphine [x] Other    Created with Raphaël 2.2.0All Prescribers  Created with Raphaël 2.2.9Twabauwq31/[id number] - Ernesto Manleya2 - Ryan Cortés B3 - Meliton De Paz4 - Michelle Morgan5 - Carmen Mccloud6 - Kobe Hawley  Morphine MgEq (MME)  Created with Raphaël 2.2.2634387241  Created with Raphaël 2.2.0Uxwhdrmd26/336i1e7p4r  Lorazepam MgEq (LME)  Created with Raphaël 2.2.1283426  Created with Raphaël 2.2.8Sqwlggqi16/771o7i4i6y  *Per CDC guidance, the MME conversion factors prescribed or provided as part of the medication-assisted treatment for opioid use disorder should not be used to  benchmark against dosage thresholds meant for opioids prescribed for pain. Buprenorphine products have no agreed upon morphine equivalency, and as partial opioid agonists, are not expected to be associated with overdose risk in the same dose-dependent manner as doses for full agonist opioids. MME = morphine milligram equivalents. LME = Lorazepam milligram equivalents. mg = dose in milligrams.     Data Analysis   Narcotics (290) 2 months 6 months 1 year 2 years   Prescribers (narcotic, sedative) 1  19 2  22 2  16 6  30   Pharmacies (narcotic, sedative) 1  25 1  16 1  13 1  10   Morphine mg 200  29 340  44 1015  66 2958  79   Morphine overlap (1) 0  0 0  0 0  0 0  0           Sedatives (230) 2 months 6 months 1 year 2 years   Prescribers (narcotic, sedative) 1  19 2  22 2  16 6  30   Pharmacies (narcotic, sedative) 1  25 1  16 1  13 1  10   Sedative mg 4  4 19  20 49  38 139  57   Sedative overlap (1) 0  0 0  0 0  0 0  0           Stimulants (000)  2 months 6 months 1 year 2 years   Prescribers (stimulant) 0  0 0  0 0  0 0  0   Pharmacies (stimulant) 0  0 0  0 0  0 0  0   Stimulant days 0  0 0  0 0  0 0  0   Stimulant overlap (1) 0  0 0  0 0  0 0  0      (1) Number of days for which a simliar type of medication was prescribed from different prescribers for use on the same day.         Summary   Total Prescriptions: 24   Total Prescribers: 6   Total Pharmacies: 1   Narcotics*    (excluding buprenorphine)  Current Qty: 0   Current MME/day: 0.00   30 Day Avg MME/day: 0.00   Buprenorphine*   Current Qty: 0   Current mg/day: 0.00   30 Day Avg mg/day: 0.00   Sedatives*   Current Qty: 0   Current LME/day: 0.00   30 Day Avg LME/day: 0.00   *Highlighted drugs could not be included in score calculations   PRESCRIPTIONS  Total Prescriptions: 24   Total Private Pay: 1   Fill Date ID Written Drug Qty Days Prescriber Rx # Pharmacy Refill Daily Dose * Pymt Type    07/26/2019  1    07/25/2019  Alprazolam 1 MG Tablet  2.00 1 Ca Gloria  [id number]   [facility]  [place]  4.[place]   07/23/2019  1   07/23/2019  Hydrocodone-Acetamin 5-325 MG  40.00 7 Ca Gloria  [id number]   [facility]  0  28.57 [place]   07/02/2019  1   07/02/2019  Hydrocodone-Acetamin 5-325 MG  28.00 7 Ja Carie  [id number]   [facility]  [place]  20.00 [place]   06/11/2019  1   12/13/2018  Zolpidem Tartrate 10 MG Tablet  30.00 30 Ja Carie  [id number]   [facility]  2  0.50 [place]   03/16/2019  1   12/13/2018  Zolpidem Tartrate 10 MG Tablet  30.00 30 Ja Carie  [id number]   [facility]  1  0.50 [place]   01/02/2019  1   01/02/2019  Hydrocodone-Acetamin 5-325 MG  45.00 90 Ja Carie  [id number]   [facility]  0  2.50 [place]   12/13/2018  1   12/13/2018  Zolpidem Tartrate 10 MG Tablet  30.00 30 Ja Carie  [id number]   [facility]  [place]  0.50 [place]   10/18/2018  1   10/18/2018  Hydrocodone-Acetamin 5-325 MG  45.00 10 Ja Carie  [id number]   [facility]  [place]  22.50 [place]   09/17/2018  1   09/17/2018  Hydrocodone-Acetamin 5-325 MG  45.00 10 Elpidio Carie  [id number]   [facility]  [place]  22.50 [place]   09/04/2018  1   09/04/2018  Hydrocodone-Acetamin 5-325 MG  60.00 10 Ja Carie  [id number]   [facility]  [place]  30.00 [place]   08/20/2018  1   08/20/2018  Hydrocodone-Acetamin 5-325 MG  60.00 10 Elpidio Bender  [id number]   [facility]  [place]  30.00 [place]   08/13/2018  1   08/13/2018  Hydrocodone-Acetamin 5-325 MG  40.00 5 Re Cov  [id number]   [facility]  [place]  40.00 [place]   08/08/2018  1   08/08/2018  Diazepam 5 MG Tablet  30.00 30 Re Cov  [id number]   [facility]  [place]  0.50 [place]   08/08/2018  1   08/08/2018  Oxycodone Hcl 5 MG Tablet  60.00 5 Re Cov  [id number]   [facility]  0  90.00 [place]   07/25/2018  1   07/24/2018  Hydrocodone-Acetamin 5-325 MG  42.00 14 Ja Carie  [id number]   [facility]  [place]  15.00 [place]   07/17/2018  1   07/17/2018  Hydrocodone-Acetamin 5-325 MG  42.00 7 Ke Jef  [id number]    [facility]  [place]  30.00 [place]   07/03/2018  1   07/03/2018  Hydrocodone-Acetamin 5-325 MG  42.00 7 Ja Carie  [id number]   [facility]  [place]  30.00 [place]   06/20/2018  1   02/26/2018  Zolpidem Tartrate 10 MG Tablet  30.00 30 Ja Carie  [id number]   [facility]  2  0.50 [place]   04/17/2018  1   02/26/2018  Zolpidem Tartrate 10 MG Tablet  30.00 30 Ja Carie  [id number]   [facility]  1  0.50 [place]   02/27/2018  1   02/26/2018  Zolpidem Tartrate 10 MG Tablet  30.00 30 Ja Carie  [id number]   [facility]  [place]  0.50 [place]   01/08/2018  1   08/25/2017  Zolpidem Tartrate 10 MG Tablet  30.00 30 Ja Carie  [id number]   [facility]  2  0.50 [place]   12/04/2017  1   12/04/2017  Virtussin Ac Liquid  90.00 3 Aa Nilton  [id number]   [facility]  0  9.00 [place]   11/27/2017  1   11/27/2017  Virtussin Ac Liquid  120.00 2 Ja Carie  [id number]   [facility]  0  18.00 [place]   11/07/2017  1   08/25/2017  Zolpidem Tartrate 10 MG Tablet  30.00 30 Ja Carie  [id number]   [facility]  1  0.50 [place]   *Per CDC guidance, the MME conversion factors prescribed or provided as part of the medication-assisted treatment for opioid use disorder should not be used to benchmark against dosage thresholds meant for opioids prescribed for pain. Buprenorphine products have no agreed upon morphine equivalency, and as partial opioid agonists, are not expected to be associated with overdose risk in the same dose-dependent manner as doses for full agonist opioids. MME = morphine milligram equivalents. LME = Lorazepam milligram equivalents. MG = dose in milligrams.   PROVIDERS  Total Providers: 6   Name Address City State Zipcode Phone   Ryan Lopes [address] [place] [phone]    Kobe Hawley [address] [facility] [address]    Carmen Mccloud [address] [place] [address]    Michelle Morgan [address] [place] [address]    Meliton De Paz [address] [place] [address]    Ernesto Dorado  [address] [facility] [address] [facility] [address]    PHARMACIES  Total Pharmacies: 1   Name Address City State Zipcode Phone   [facility]  [phone] [facility] [phone]

## 2019-09-17 NOTE — PROGRESS NOTES
"Vickey Turner is a 69 y.o. male here for a non-provider visit for:   FLU    Reason for immunization: Annual Flu Vaccine  Immunization records indicate need for vaccine: Yes, confirmed with Epic  Minimum interval has been met for this vaccine: Yes  ABN completed: Yes    Order and dose verified by: Heidi SAAVEDRA Dated  08/15/2019 was given to patient: Yes  All IAC Questionnaire questions were answered \"No.\"    Patient tolerated injection and no adverse effects were observed or reported: Yes    Pt scheduled for next dose in series: No    "

## 2019-09-18 RX ORDER — ZOLPIDEM TARTRATE 10 MG/1
TABLET ORAL
Qty: 30 TAB | Refills: 2 | Status: SHIPPED | OUTPATIENT
Start: 2019-09-18 | End: 2019-09-19 | Stop reason: SDUPTHER

## 2019-09-19 ENCOUNTER — TELEPHONE (OUTPATIENT)
Dept: MEDICAL GROUP | Facility: PHYSICIAN GROUP | Age: 69
End: 2019-09-19

## 2019-09-19 DIAGNOSIS — F51.01 PRIMARY INSOMNIA: ICD-10-CM

## 2019-09-19 RX ORDER — ZOLPIDEM TARTRATE 10 MG/1
TABLET ORAL
Qty: 30 TAB | Refills: 2 | Status: SHIPPED | OUTPATIENT
Start: 2019-09-19 | End: 2019-12-19

## 2019-09-19 NOTE — TELEPHONE ENCOUNTER
Patient stopped by the  today asking for his rx to be resent to pharmacy as it was sent incorrectly. Spoke with [facility] pharmacy and confirmed Ambien rx was received. Pharmacist states the refill was received and has been filled and ready for  today.

## 2019-09-19 NOTE — TELEPHONE ENCOUNTER
Was the patient seen in the last year in this department? Yes    Does patient have an active prescription for medications requested? No     Received Request Via: Patient          RASHEED BETANCUR     Age: 69  demographics  Data as of: 09/19/2019              NARCOTIC 280    Created with Raphaël [id number]   SEDATIVE 230       STIMULANT 000       NARxSCORES can range from 000 to 999. The first two digits represent the composite percentile risk based on an overall analysis of prescription drug use. The third digit represents the number of active prescriptions. The distribution of scores in the population is such that approximately 75% fall below 200, 95% fall below 500 and 99% fall below 650. The information on this report is not warranted as accurate or complete. This report is based on the search criteria supplied and the data entered by the dispensing pharmacy. For more information about any prescription, please contact the dispensing pharmacy or the prescriber. NARxSCORES and Reports are intended to aid, not replace medical decision making. None of the information presented should be used as sole justification for providing or refusing to provide medications.       RX GRAPH Grayed out drugs could not be included in score calculations.   [x] Narcotic [x] Sedative [x] Stimulant [x] Buprenorphine [x] Other    Created with Raphaël 2.2.0All Prescribers  Created with Raphaël 2.2.8Pbgplxeo49/[id number] - Ernesto Manleya2 - Ryan Cortés B3 - Meliton De Paz4 - Michelle Morgan5 - Carmen Mccloud6 - Kobe Hawley  Morphine MgEq (MME)  Created with Raphaël 2.2.3511796990  Created with Raphaël 2.2.6Uozbaozh10/253h1h7e6m  Lorazepam MgEq (LME)  Created with Raphaël 2.2.9094150  Created with Raphaël 2.2.7Vsmzylef21/784z1y7u4t  *Per CDC guidance, the MME conversion factors prescribed or provided as part of the medication-assisted treatment for opioid use disorder should not be used to  benchmark against dosage thresholds meant for opioids prescribed for pain. Buprenorphine products have no agreed upon morphine equivalency, and as partial opioid agonists, are not expected to be associated with overdose risk in the same dose-dependent manner as doses for full agonist opioids. MME = morphine milligram equivalents. LME = Lorazepam milligram equivalents. mg = dose in milligrams.     Data Analysis   Narcotics (280) 2 months 6 months 1 year 2 years   Prescribers (narcotic, sedative) 1  19 2  22 2  16 6  30   Pharmacies (narcotic, sedative) 1  25 1  16 1  13 1  10   Morphine mg 200  29 340  44 790  61 2958  79   Morphine overlap (1) 0  0 0  0 0  0 0  0           Sedatives (230) 2 months 6 months 1 year 2 years   Prescribers (narcotic, sedative) 1  19 2  22 2  16 6  30   Pharmacies (narcotic, sedative) 1  25 1  16 1  13 1  10   Sedative mg 4  4 19  20 49  38 139  57   Sedative overlap (1) 0  0 0  0 0  0 0  0           Stimulants (000)  2 months 6 months 1 year 2 years   Prescribers (stimulant) 0  0 0  0 0  0 0  0   Pharmacies (stimulant) 0  0 0  0 0  0 0  0   Stimulant days 0  0 0  0 0  0 0  0   Stimulant overlap (1) 0  0 0  0 0  0 0  0      (1) Number of days for which a simliar type of medication was prescribed from different prescribers for use on the same day.         Summary   Total Prescriptions: 24   Total Prescribers: 6   Total Pharmacies: 1   Narcotics*    (excluding buprenorphine)  Current Qty: 0   Current MME/day: 0.00   30 Day Avg MME/day: 0.00   Buprenorphine*   Current Qty: 0   Current mg/day: 0.00   30 Day Avg mg/day: 0.00   Sedatives*   Current Qty: 0   Current LME/day: 0.00   30 Day Avg LME/day: 0.00   *Highlighted drugs could not be included in score calculations   PRESCRIPTIONS  Total Prescriptions: 24   Total Private Pay: 1   Fill Date ID Written Drug Qty Days Prescriber Rx # Pharmacy Refill Daily Dose * Pymt Type    07/26/2019  1    07/25/2019  Alprazolam 1 MG Tablet  2.00 1 Ca Gloria  [id number]   [facility]  [place]  4.[place]   07/23/2019  1   07/23/2019  Hydrocodone-Acetamin 5-325 MG  40.00 7 Ca Gloria  [id number]   [facility] ([facility]  [place]  28.57 [place]   07/02/2019  1   07/02/2019  Hydrocodone-Acetamin 5-325 MG  28.00 7 Ja Carie  [id number]   [facility] ([facility]  [place]  20.00 [place]   06/11/2019  1   12/13/2018  Zolpidem Tartrate 10 MG Tablet  30.00 30 Ja Carie  [id number]   [facility] ([facility]  2  0.50 [place]   03/16/2019  1   12/13/2018  Zolpidem Tartrate 10 MG Tablet  30.00 30 Ja Carie  [id number]   [facility] ([facility]  1  0.50 [place]   01/02/2019  1   01/02/2019  Hydrocodone-Acetamin 5-325 MG  45.00 90 Ja Carie  [id number]   [facility] ([facility]  0  2.50 [place]   12/13/2018  1   12/13/2018  Zolpidem Tartrate 10 MG Tablet  30.00 30 Ja Carie  [id number]   [facility] ([facility]  [place]  0.50 [place]   10/18/2018  1   10/18/2018  Hydrocodone-Acetamin 5-325 MG  45.00 10 Ja Carie  [id number]   [facility] ([facility]  [place]  22.50 [place]   09/17/2018  1   09/17/2018  Hydrocodone-Acetamin 5-325 MG  45.00 10 Elpidio Carie  [id number]   [facility] ([facility])  [place]  22.50 [place]   09/04/2018  1   09/04/2018  Hydrocodone-Acetamin 5-325 MG  60.00 10 Ja Carie  [id number]   [facility] ([facility]  [place]  30.00 [place]   08/20/2018  1   08/20/2018  Hydrocodone-Acetamin 5-325 MG  60.00 10 Elpidio Bender  [id number]   [facility] ([facility]  [place]  30.00 [place]   08/13/2018  1   08/13/2018  Hydrocodone-Acetamin 5-325 MG  40.00 5 Re Cov  [id number]   [facility] ([facility]  [place]  40.00 [place]   08/08/2018  1   08/08/2018  Oxycodone Hcl 5 MG Tablet  60.00 5 Re Cov  [id number]   [facility] (6725[facility]  [place]  90.00 [place]   08/08/2018  1   08/08/2018  Diazepam 5 MG Tablet  30.00 30 Re Cov  [id number]   [facility] (9476)  [place]  0.50 [place]   07/25/2018  1   07/24/2018  Hydrocodone-Acetamin 5-325 MG  42.00 14 Ja Carie  [id number]   [facility] (2455)  [place]  15.00 [place]   07/17/2018  1   07/17/2018  Hydrocodone-Acetamin 5-325 MG  42.00 7 Ke Jef  [id number]    [facility] ([facility])  [place]  30.00 [place]   07/03/2018  1   07/03/2018  Hydrocodone-Acetamin 5-325 MG  42.00 7 Ja Carie  [id number]   [facility] ([facility]  [place]  30.00 [place]   06/20/2018  1   02/26/2018  Zolpidem Tartrate 10 MG Tablet  30.00 30 Ja Carie  [id number]   [facility] ([facility]  2  0.50 [place]   04/17/2018  1   02/26/2018  Zolpidem Tartrate 10 MG Tablet  30.00 30 Ja Carie  [id number]   [facility] ([facility]25)  1  0.50 [place]   02/27/2018  1   02/26/2018  Zolpidem Tartrate 10 MG Tablet  30.00 30 Ja Carie  [id number]   [facility] ([facility]  [place]  0.50 [place]   01/08/2018  1   08/25/2017  Zolpidem Tartrate 10 MG Tablet  30.00 30 Ja Carie  [id number]   [facility] ([facility])  2  0.50 [place]   12/04/2017  1   12/04/2017  Virtussin Ac Liquid  90.00 3 Aa Nilton  [id number]   [facility] ([facility]25)  0  9.00 [place]   11/27/2017  1   11/27/2017  Virtussin Ac Liquid  120.00 2 Ja Carie  [id number]   [facility] ([facility]25)  0  18.00 [place]   11/07/2017  1   08/25/2017  Zolpidem Tartrate 10 MG Tablet  30.00 30 Ja Carie  [id number]   [facility] (7525)  1  0.50 L[place]   *Per CDC guidance, the MME conversion factors prescribed or provided as part of the medication-assisted treatment for opioid use disorder should not be used to benchmark against dosage thresholds meant for opioids prescribed for pain. Buprenorphine products have no agreed upon morphine equivalency, and as partial opioid agonists, are not expected to be associated with overdose risk in the same dose-dependent manner as doses for full agonist opioids. MME = morphine milligram equivalents. LME = Lorazepam milligram equivalents. MG = dose in milligrams.   PROVIDERS  Total Providers: 6   Name Address City State Zipcode Phone   Ryan Lopes [address] [place] [address]    Kobe Hawley [address] [facility] [address]    Carmen Mccloud [address] [place] [address]    Michelle Morgan [address] [place] [address]    Meliton De Paz [address] [place] [address]    Ernesto Dorado  [address] [address]    PHARMACIES  Total Pharmacies: 1   Name Address [facility] State Zipcode Phone   [facility]  [phone] [place] [phone]   Kobe Hawley  [address] [place] [phone]

## 2019-10-09 ENCOUNTER — HOSPITAL ENCOUNTER (OUTPATIENT)
Dept: LAB | Facility: MEDICAL CENTER | Age: 69
End: 2019-10-09
Attending: PHYSICAL MEDICINE & REHABILITATION
Payer: MEDICARE

## 2019-10-09 ENCOUNTER — HOSPITAL ENCOUNTER (OUTPATIENT)
Dept: LAB | Facility: MEDICAL CENTER | Age: 69
End: 2019-10-09
Attending: INTERNAL MEDICINE
Payer: MEDICARE

## 2019-10-09 DIAGNOSIS — K21.9 GASTROESOPHAGEAL REFLUX DISEASE, ESOPHAGITIS PRESENCE NOT SPECIFIED: ICD-10-CM

## 2019-10-09 DIAGNOSIS — M62.838 MUSCLE SPASM: ICD-10-CM

## 2019-10-09 DIAGNOSIS — K70.9 ALCOHOLIC LIVER DISEASE, UNSPECIFIED (HCC): ICD-10-CM

## 2019-10-09 DIAGNOSIS — F10.21 ALCOHOL DEPENDENCE IN REMISSION (HCC): ICD-10-CM

## 2019-10-09 LAB
ALBUMIN SERPL BCP-MCNC: 4.5 G/DL (ref 3.2–4.9)
ALBUMIN SERPL BCP-MCNC: 4.5 G/DL (ref 3.2–4.9)
ALBUMIN/GLOB SERPL: 1.6 G/DL
ALBUMIN/GLOB SERPL: 1.6 G/DL
ALP SERPL-CCNC: 66 U/L (ref 30–99)
ALP SERPL-CCNC: 67 U/L (ref 30–99)
ALT SERPL-CCNC: 19 U/L (ref 2–50)
ALT SERPL-CCNC: 19 U/L (ref 2–50)
ANION GAP SERPL CALC-SCNC: 7 MMOL/L (ref 0–11.9)
ANION GAP SERPL CALC-SCNC: 9 MMOL/L (ref 0–11.9)
AST SERPL-CCNC: 22 U/L (ref 12–45)
AST SERPL-CCNC: 22 U/L (ref 12–45)
BASOPHILS # BLD AUTO: 0.5 % (ref 0–1.8)
BASOPHILS # BLD: 0.05 K/UL (ref 0–0.12)
BILIRUB SERPL-MCNC: 0.3 MG/DL (ref 0.1–1.5)
BILIRUB SERPL-MCNC: 0.4 MG/DL (ref 0.1–1.5)
BUN SERPL-MCNC: 12 MG/DL (ref 8–22)
BUN SERPL-MCNC: 12 MG/DL (ref 8–22)
CALCIUM SERPL-MCNC: 9.1 MG/DL (ref 8.5–10.5)
CALCIUM SERPL-MCNC: 9.2 MG/DL (ref 8.5–10.5)
CHLORIDE SERPL-SCNC: 106 MMOL/L (ref 96–112)
CHLORIDE SERPL-SCNC: 107 MMOL/L (ref 96–112)
CO2 SERPL-SCNC: 28 MMOL/L (ref 20–33)
CO2 SERPL-SCNC: 29 MMOL/L (ref 20–33)
CREAT SERPL-MCNC: 0.9 MG/DL (ref 0.5–1.4)
CREAT SERPL-MCNC: 1 MG/DL (ref 0.5–1.4)
EOSINOPHIL # BLD AUTO: 0.1 K/UL (ref 0–0.51)
EOSINOPHIL NFR BLD: 1.1 % (ref 0–6.9)
ERYTHROCYTE [DISTWIDTH] IN BLOOD BY AUTOMATED COUNT: 47.5 FL (ref 35.9–50)
FASTING STATUS PATIENT QL REPORTED: NORMAL
FASTING STATUS PATIENT QL REPORTED: NORMAL
GLOBULIN SER CALC-MCNC: 2.9 G/DL (ref 1.9–3.5)
GLOBULIN SER CALC-MCNC: 2.9 G/DL (ref 1.9–3.5)
GLUCOSE SERPL-MCNC: 81 MG/DL (ref 65–99)
GLUCOSE SERPL-MCNC: 81 MG/DL (ref 65–99)
HCT VFR BLD AUTO: 47.2 % (ref 42–52)
HGB BLD-MCNC: 15.5 G/DL (ref 14–18)
IMM GRANULOCYTES # BLD AUTO: 0.02 K/UL (ref 0–0.11)
IMM GRANULOCYTES NFR BLD AUTO: 0.2 % (ref 0–0.9)
INR PPP: 0.94 (ref 0.87–1.13)
LYMPHOCYTES # BLD AUTO: 0.88 K/UL (ref 1–4.8)
LYMPHOCYTES NFR BLD: 9.6 % (ref 22–41)
MCH RBC QN AUTO: 32.6 PG (ref 27–33)
MCHC RBC AUTO-ENTMCNC: 32.8 G/DL (ref 33.7–35.3)
MCV RBC AUTO: 99.4 FL (ref 81.4–97.8)
MONOCYTES # BLD AUTO: 0.48 K/UL (ref 0–0.85)
MONOCYTES NFR BLD AUTO: 5.3 % (ref 0–13.4)
NEUTROPHILS # BLD AUTO: 7.61 K/UL (ref 1.82–7.42)
NEUTROPHILS NFR BLD: 83.3 % (ref 44–72)
NRBC # BLD AUTO: 0 K/UL
NRBC BLD-RTO: 0 /100 WBC
PLATELET # BLD AUTO: 237 K/UL (ref 164–446)
PMV BLD AUTO: 10 FL (ref 9–12.9)
POTASSIUM SERPL-SCNC: 4.2 MMOL/L (ref 3.6–5.5)
POTASSIUM SERPL-SCNC: 4.2 MMOL/L (ref 3.6–5.5)
PROT SERPL-MCNC: 7.4 G/DL (ref 6–8.2)
PROT SERPL-MCNC: 7.4 G/DL (ref 6–8.2)
PROTHROMBIN TIME: 12.7 SEC (ref 12–14.6)
RBC # BLD AUTO: 4.75 M/UL (ref 4.7–6.1)
SODIUM SERPL-SCNC: 142 MMOL/L (ref 135–145)
SODIUM SERPL-SCNC: 144 MMOL/L (ref 135–145)
WBC # BLD AUTO: 9.1 K/UL (ref 4.8–10.8)

## 2019-10-09 PROCEDURE — 85610 PROTHROMBIN TIME: CPT

## 2019-10-09 PROCEDURE — 80053 COMPREHEN METABOLIC PANEL: CPT | Mod: 91

## 2019-10-09 PROCEDURE — 85025 COMPLETE CBC W/AUTO DIFF WBC: CPT

## 2019-10-09 PROCEDURE — 36415 COLL VENOUS BLD VENIPUNCTURE: CPT

## 2019-10-09 PROCEDURE — 80053 COMPREHEN METABOLIC PANEL: CPT

## 2019-10-09 PROCEDURE — 82105 ALPHA-FETOPROTEIN SERUM: CPT

## 2019-10-11 LAB — AFP-TM SERPL-MCNC: 2 NG/ML (ref 0–9)

## 2019-10-19 DIAGNOSIS — G43.009 MIGRAINE WITHOUT AURA AND WITHOUT STATUS MIGRAINOSUS, NOT INTRACTABLE: ICD-10-CM

## 2019-10-21 RX ORDER — BUTALBITAL, ACETAMINOPHEN AND CAFFEINE 50; 325; 40 MG/1; MG/1; MG/1
TABLET ORAL
Qty: 30 TAB | Refills: 0 | Status: SHIPPED
Start: 2019-10-21 | End: 2019-10-23 | Stop reason: SDUPTHER

## 2019-10-23 DIAGNOSIS — G43.009 MIGRAINE WITHOUT AURA AND WITHOUT STATUS MIGRAINOSUS, NOT INTRACTABLE: ICD-10-CM

## 2019-10-23 RX ORDER — BUTALBITAL, ACETAMINOPHEN AND CAFFEINE 50; 325; 40 MG/1; MG/1; MG/1
TABLET ORAL
Qty: 30 TAB | Refills: 0 | Status: SHIPPED | OUTPATIENT
Start: 2019-10-23 | End: 2019-11-23

## 2019-10-23 NOTE — TELEPHONE ENCOUNTER
Pt is here to ask for a refill of the medication Butal/acetamnicf. Please call pt with any questions.

## 2019-10-23 NOTE — TELEPHONE ENCOUNTER
Was the patient seen in the last year in this department? Yes    Does patient have an active prescription for medications requested? No     Received Request Via: Pharmacy       RASHEED BETANCUR     Age: 69  demographics  Data as of: 10/23/2019              NARCOTIC 260    Created with Raphaël [id number]   SEDATIVE 250       STIMULANT 000       NARxSCORES can range from 000 to 999. The first two digits represent the composite percentile risk based on an overall analysis of prescription drug use. The third digit represents the number of active prescriptions. The distribution of scores in the population is such that approximately 75% fall below 200, 95% fall below 500 and 99% fall below 650. The information on this report is not warranted as accurate or complete. This report is based on the search criteria supplied and the data entered by the dispensing pharmacy. For more information about any prescription, please contact the dispensing pharmacy or the prescriber. NARxSCORES and Reports are intended to aid, not replace medical decision making. None of the information presented should be used as sole justification for providing or refusing to provide medications.       RX GRAPH Grayed out drugs could not be included in score calculations.   [x] Narcotic [x] Sedative [x] Stimulant [x] Buprenorphine [x] Other    Created with Raphaël 2.2.0All Prescribers  Created with Raphaël [id number] - Ryan Cortés B2 - Ernesto Dorado3 - Meliton De Paz4 - Michelle Morgan5 - Carmen Mccloud6 - Kobe Hawley  Morphine MgEq (MME)  Created with Raphaël [id number]  Created with Raphaël [id number]  Lorazepam MgEq (LME)  Created with Raphaël 2.2.5286565  Created with Raphaël [id number]  *Per CDC guidance, the MME conversion factors prescribed or provided as part of the medication-assisted treatment for opioid use disorder should not be used to benchmark  against dosage thresholds meant for opioids prescribed for pain. Buprenorphine products have no agreed upon morphine equivalency, and as partial opioid agonists, are not expected to be associated with overdose risk in the same dose-dependent manner as doses for full agonist opioids. MME = morphine milligram equivalents. LME = Lorazepam milligram equivalents. mg = dose in milligrams.     Data Analysis   Narcotics (260) 2 months 6 months 1 year 2 years   Prescribers (narcotic, sedative) 1  19 2  22 2  16 6  30   Pharmacies (narcotic, sedative) 1  25 1  16 1  13 1  10   Morphine mg 0  0 340  44 565  55 2958  79   Morphine overlap (1) 0  0 0  0 0  0 0  0           Sedatives (250) 2 months 6 months 1 year 2 years   Prescribers (narcotic, sedative) 1  19 2  22 2  16 6  30   Pharmacies (narcotic, sedative) 1  25 1  16 1  13 1  10   Sedative mg 15  20 34  32 64  43 154  58   Sedative overlap (1) 0  0 0  0 0  0 0  0           Stimulants (000)  2 months 6 months 1 year 2 years   Prescribers (stimulant) 0  0 0  0 0  0 0  0   Pharmacies (stimulant) 0  0 0  0 0  0 0  0   Stimulant days 0  0 0  0 0  0 0  0   Stimulant overlap (1) 0  0 0  0 0  0 0  0      (1) Number of days for which a simliar type of medication was prescribed from different prescribers for use on the same day.         Summary   Total Prescriptions: 25   Total Prescribers: 6   Total Pharmacies: 1   Narcotics*    (excluding buprenorphine)  Current Qty: 0   Current MME/day: 0.00   30 Day Avg MME/day: 0.00   Buprenorphine*   Current Qty: 0   Current mg/day: 0.00   30 Day Avg mg/day: 0.00   Sedatives*   Current Qty: 0   Current LME/day: 0.00   30 Day Avg LME/day: 0.42   *Highlighted drugs could not be included in score calculations   PRESCRIPTIONS  Total Prescriptions: 25   Total Private Pay: 1   Fill Date ID Written Drug Qty Days Prescriber Rx # Pharmacy Refill Daily Dose * Pymt Type    09/18/2019  1   09/18/2019   Zolpidem Tartrate 10 MG Tablet  30.00 30 Ja Carie  [id number]   [facility]  [place]  0.50 [place]   07/26/2019  1   07/25/2019  Alprazolam 1 MG Tablet  2.00 1 Ca Gloria  [id number]   [facility]  [place]  4.00 [place]   07/23/2019  1   07/23/2019  Hydrocodone-Acetamin 5-325 MG  40.00 7 Ca Gloria  [id number]   [facility] ([facility]  [place]  28.57 [place]   07/02/2019  1   07/02/2019  Hydrocodone-Acetamin 5-325 MG  28.00 7 Ja Acrie  [id number]   [facility] ([facility]  [place]  20.00 [place]   06/11/2019  1   12/13/2018  Zolpidem Tartrate 10 MG Tablet  30.00 30 Elpidio Carie  [id number]   [facility]  [place]  0.50 [place]   03/16/2019  1   12/13/2018  Zolpidem Tartrate 10 MG Tablet  30.00 30  Carie  [id number]   [facility] ([facility]  [place]  0.50 [place]   01/02/2019  1   01/02/2019  Hydrocodone-Acetamin 5-325 MG  45.00 90 Ja Carie  [id number]   [facility] ([facility]  [place]  2.50 [place]   12/13/2018  1   12/13/2018  Zolpidem Tartrate 10 MG Tablet  30.00 30  Carie  [id number]   [facility] ([facility]25[facility]  [place]  0.50 [place]   10/18/2018  1   10/18/2018  Hydrocodone-Acetamin 5-325 MG  45.00 10 Elpidio Carie  [id number]   [facility] (7525[facility]  [place]  22.50 [place]   09/17/2018  1   09/17/2018  Hydrocodone-Acetamin 5-325 MG  45.00 10 lEpidio Carie  [id number]   [facility] ([facility]25[facility]  [place]  22.50 [place]   09/04/2018  1   09/04/2018  Hydrocodone-Acetamin 5-325 MG  60.00 10 Elpidio Carie  [id number]   [facility] ([facility]25[facility]  [place]  30.00 [place]   08/20/2018  1   08/20/2018  Hydrocodone-Acetamin 5-325 MG  60.00 10 Elpidio Bender  [id number]   [facility] ([facility]25[facility]  [place]  30.00 [place]   08/13/2018  1   08/13/2018  Hydrocodone-Acetamin 5-325 MG  40.00 5 Re Cov  [id number]   [facility] (8714[facility]  [place]  40.00 [place]   08/08/2018  1   08/08/2018  Oxycodone Hcl 5 MG Tablet  60.00 5 Re Cov  [id number]   [facility] (3670[facility]  [place]  90.00 [place]   08/08/2018  1   08/08/2018  Diazepam 5 MG Tablet  30.00 30 Re Cov  [id number]   [facility] (0241)  [place]  0.50 [place]   07/25/2018  1   07/24/2018  Hydrocodone-Acetamin 5-325 MG  42.00 14 Ja Carie  [id number]   [facility] (1718)   [place]  15.00 [place]   07/17/2018  1   07/17/2018  Hydrocodone-Acetamin 5-325 MG  42.00 7 Ke Jef  [id number]   [facility] (7525[facility]  [place]  30.00 [place]   07/03/2018  1   07/03/2018  Hydrocodone-Acetamin 5-325 MG  42.00 7 Ja Carie  [id number]   [facility] (7525[facility]  [place]  30.00 [place]   06/20/2018  1   02/26/2018  Zolpidem Tartrate 10 MG Tablet  30.00 30 Ja Carie  [id number]   [facility] (7525[facility]  [place]  0.50 [place]   04/17/2018  1   02/26/2018  Zolpidem Tartrate 10 MG Tablet  30.00 30 Ja Carie  [id number]   [facility] (7525[facility]  [place]  0.50 [place]   02/27/2018  1   02/26/2018  Zolpidem Tartrate 10 MG Tablet  30.00 30 Ja Carie  [id number]   [facility] (7525[facility]  [place]  0.50 [place]   01/08/2018  1   08/25/2017  Zolpidem Tartrate 10 MG Tablet  30.00 30 Ja Carie  [id number]   [facility] (7525[facility]  [place]  0.50 [place]   12/04/2017  1   12/04/2017  Virtussin Ac Liquid  90.00 3 Aa Nilton  [id number]   [facility] (7525[facility]  [place]  9.00 [place]   11/27/2017  1   11/27/2017  Virtussin Ac Liquid  120.00 2 Ja Carie  [id number]   [facility] ([facility]25[facility]  [place]  18.00 [place]   11/07/2017  1   08/25/2017  Zolpidem Tartrate 10 MG Tablet  30.00 30 Ja Carie  [id number]   [facility] (7525)  1  0.50 [place]   *Per CDC guidance, the MME conversion factors prescribed or provided as part of the medication-assisted treatment for opioid use disorder should not be used to benchmark against dosage thresholds meant for opioids prescribed for pain. Buprenorphine products have no agreed upon morphine equivalency, and as partial opioid agonists, are not expected to be associated with overdose risk in the same dose-dependent manner as doses for full agonist opioids. MME = morphine milligram equivalents. LME = Lorazepam milligram equivalents. MG = dose in milligrams.   PROVIDERS  Total Providers: 6   Name Address City State Zipcode Phone   Kobe Hawley [address] [address] [place] [address]    Carmen Mccloud [address] [place] [address]    Meliton De Paz [address] [place] [address]    Michelle Morgan [address]  [facility] [address]    Ryan Cortés [facility] [address] [facility] [address]    Ernesto Dorado [address] [address] [facility] [address]    PHARMACIES  Total Pharmacies: 1   Name Address City State Zipcode Phone   [facility]  [phone] [place] [phone]

## 2019-10-29 ENCOUNTER — HOSPITAL ENCOUNTER (OUTPATIENT)
Dept: RADIOLOGY | Facility: MEDICAL CENTER | Age: 69
End: 2019-10-29
Attending: INTERNAL MEDICINE
Payer: MEDICARE

## 2019-10-29 ENCOUNTER — TELEPHONE (OUTPATIENT)
Dept: PHYSICAL MEDICINE AND REHAB | Facility: MEDICAL CENTER | Age: 69
End: 2019-10-29

## 2019-10-29 DIAGNOSIS — K74.5 BILIARY CIRRHOSIS (HCC): ICD-10-CM

## 2019-10-29 PROCEDURE — 76700 US EXAM ABDOM COMPLETE: CPT

## 2019-10-29 NOTE — TELEPHONE ENCOUNTER
Pt came in today to have his records sent over to Doctor Bat[facility], he had requested they be sent in September. The phone number for their office is [phone]. The address is [address] [place] [address].

## 2019-10-29 NOTE — TELEPHONE ENCOUNTER
Faxed KARLOS to [facility] records so that they can fax his medical records to [facility].    Thank you  [place]

## 2019-11-11 ENCOUNTER — APPOINTMENT (RX ONLY)
Dept: URBAN - METROPOLITAN AREA CLINIC 20 | Facility: CLINIC | Age: 69
Setting detail: DERMATOLOGY
End: 2019-11-11

## 2019-11-11 DIAGNOSIS — L57.0 ACTINIC KERATOSIS: ICD-10-CM

## 2019-11-11 DIAGNOSIS — L82.1 OTHER SEBORRHEIC KERATOSIS: ICD-10-CM

## 2019-11-11 PROCEDURE — ? COUNSELING

## 2019-11-11 PROCEDURE — 17004 DESTROY PREMAL LESIONS 15/>: CPT

## 2019-11-11 PROCEDURE — ? ADDITIONAL NOTES

## 2019-11-11 PROCEDURE — ? LIQUID NITROGEN

## 2019-11-11 PROCEDURE — 99213 OFFICE O/P EST LOW 20 MIN: CPT | Mod: 25

## 2019-11-11 ASSESSMENT — LOCATION SIMPLE DESCRIPTION DERM
LOCATION SIMPLE: RIGHT FOREARM
LOCATION SIMPLE: LEFT HAND
LOCATION SIMPLE: LEFT ELBOW
LOCATION SIMPLE: LEFT LOWER BACK
LOCATION SIMPLE: RIGHT HAND
LOCATION SIMPLE: LEFT FOREARM

## 2019-11-11 ASSESSMENT — LOCATION ZONE DERM
LOCATION ZONE: ARM
LOCATION ZONE: TRUNK
LOCATION ZONE: HAND

## 2019-11-11 ASSESSMENT — LOCATION DETAILED DESCRIPTION DERM
LOCATION DETAILED: LEFT PROXIMAL RADIAL DORSAL FOREARM
LOCATION DETAILED: LEFT LATERAL ELBOW
LOCATION DETAILED: RIGHT DORSAL INDEX METACARPOPHALANGEAL JOINT
LOCATION DETAILED: LEFT INFERIOR MEDIAL MIDBACK
LOCATION DETAILED: 1ST WEB SPACE LEFT HAND
LOCATION DETAILED: LEFT RADIAL DORSAL HAND
LOCATION DETAILED: RIGHT RADIAL DORSAL HAND
LOCATION DETAILED: 2ND WEB SPACE RIGHT HAND
LOCATION DETAILED: RIGHT PROXIMAL RADIAL DORSAL FOREARM
LOCATION DETAILED: LEFT ULNAR DORSAL HAND
LOCATION DETAILED: RIGHT ULNAR DORSAL HAND

## 2019-11-27 ENCOUNTER — HOSPITAL ENCOUNTER (OUTPATIENT)
Dept: LAB | Facility: MEDICAL CENTER | Age: 69
End: 2019-11-27
Attending: PHYSICIAN ASSISTANT
Payer: MEDICARE

## 2019-11-27 LAB — PSA SERPL-MCNC: 1.13 NG/ML (ref 0–4)

## 2019-11-27 PROCEDURE — 36415 COLL VENOUS BLD VENIPUNCTURE: CPT | Mod: GA

## 2019-11-27 PROCEDURE — 84153 ASSAY OF PSA TOTAL: CPT | Mod: GA

## 2019-12-27 ENCOUNTER — APPOINTMENT (RX ONLY)
Dept: URBAN - METROPOLITAN AREA CLINIC 36 | Facility: CLINIC | Age: 69
Setting detail: DERMATOLOGY
End: 2019-12-27

## 2019-12-27 ENCOUNTER — RX ONLY (OUTPATIENT)
Age: 69
Setting detail: RX ONLY
End: 2019-12-27

## 2019-12-27 DIAGNOSIS — L57.0 ACTINIC KERATOSIS: ICD-10-CM

## 2019-12-27 PROCEDURE — ? PHOTODYNAMIC THERAPY COUNSELING

## 2019-12-27 PROCEDURE — ? PDT: RED

## 2019-12-27 PROCEDURE — 96567 PDT DSTR PRMLG LES SKN: CPT

## 2019-12-27 ASSESSMENT — LOCATION DETAILED DESCRIPTION DERM: LOCATION DETAILED: SUPERIOR MID FOREHEAD

## 2019-12-27 ASSESSMENT — LOCATION SIMPLE DESCRIPTION DERM: LOCATION SIMPLE: SUPERIOR FOREHEAD

## 2019-12-27 ASSESSMENT — LOCATION ZONE DERM: LOCATION ZONE: FACE

## 2019-12-27 NOTE — PROCEDURE: PDT: RED
Ndc# (Optional): [id number]
Total Number Of Aks Treated (Optional To Report): 0
Expiration Date (Optional): 01.2021
Consent: Written consent obtained.  The risks were reviewed with the patient including but not limited to: pigmentary changes, pain, blistering, scabbing, redness, and the remote possibility of scarring.
Application Method: without occlusion
Who Performed The Pdt?: Performed by Nurse, MA or Aesthetician ([id number])
Post-Care Instructions: I reviewed with the patient in detail post-care instructions. Patient is to avoid sunlight for the next 2 days, and wear sun protection. Patients may expect sunburn like redness, discomfort and scabbing.
Photosensitizer: Ameluz
Debridement Text (Will Only Render In Visit Note If You Select Debridement Option Under Who Performed The Pdt Field): Prior to application of the photodynamic medication the hyperkeratotic lesions were curetted to make them more amenable to therapy.
Number Of Kerasticks/Tubes Of Metvixia/Tubes Of Ameluz Used: 1
Lot # (Optional): [id number]
Illumination Time: 7 min 07 sec
Detail Level: Zone
Anesthesia Type: 1% lidocaine with epinephrine
Incubation Time (Set To 00:00:00 If Not Wanted): 01:30:00

## 2019-12-27 NOTE — HPI: PHOTODYNAMIC THERAPY (PDT)
Have You Had Previous Treatments With Pdt Before?: has not had previous treatments
When Outside In The Sun, Do You...: always burns, never tans
Additional History: Patient was given one tablet of Acyclovir prior to treatment due history of cold sores.

## 2020-01-07 ENCOUNTER — OFFICE VISIT (OUTPATIENT)
Dept: URGENT CARE | Facility: CLINIC | Age: 70
End: 2020-01-07
Payer: MEDICARE

## 2020-01-07 VITALS
SYSTOLIC BLOOD PRESSURE: 144 MMHG | HEIGHT: 69 IN | BODY MASS INDEX: 22.51 KG/M2 | TEMPERATURE: 98.2 F | WEIGHT: 152 LBS | DIASTOLIC BLOOD PRESSURE: 80 MMHG | OXYGEN SATURATION: 98 % | HEART RATE: 80 BPM | RESPIRATION RATE: 14 BRPM

## 2020-01-07 DIAGNOSIS — J06.9 VIRAL URI WITH COUGH: ICD-10-CM

## 2020-01-07 PROCEDURE — 99214 OFFICE O/P EST MOD 30 MIN: CPT | Performed by: PHYSICIAN ASSISTANT

## 2020-01-07 RX ORDER — ALPRAZOLAM 1 MG/1
TABLET ORAL
COMMUNITY
End: 2020-09-08

## 2020-01-07 RX ORDER — AZITHROMYCIN 250 MG/1
TABLET, FILM COATED ORAL
Qty: 6 TAB | Refills: 0 | Status: SHIPPED | OUTPATIENT
Start: 2020-01-07 | End: 2020-09-08

## 2020-01-07 RX ORDER — BENZONATATE 100 MG/1
100-200 CAPSULE ORAL 3 TIMES DAILY PRN
Qty: 60 CAP | Refills: 0 | Status: SHIPPED | OUTPATIENT
Start: 2020-01-07 | End: 2020-09-08

## 2020-01-07 RX ORDER — HYDROCODONE BITARTRATE AND ACETAMINOPHEN 5; 325 MG/1; MG/1
TABLET ORAL
COMMUNITY
End: 2020-12-17

## 2020-01-07 RX ORDER — CODEINE PHOSPHATE/GUAIFENESIN 10-100MG/5
5 LIQUID (ML) ORAL 3 TIMES DAILY PRN
Qty: 100 ML | Refills: 0 | Status: SHIPPED | OUTPATIENT
Start: 2020-01-07 | End: 2020-01-14

## 2020-01-07 RX ORDER — ZOLPIDEM TARTRATE 10 MG/1
TABLET ORAL
COMMUNITY
End: 2020-05-07 | Stop reason: SDUPTHER

## 2020-01-07 RX ORDER — GABAPENTIN 300 MG/1
CAPSULE ORAL
COMMUNITY
End: 2020-09-08

## 2020-01-07 ASSESSMENT — ENCOUNTER SYMPTOMS
SORE THROAT: 0
HEADACHES: 0
COUGH: 1
SHORTNESS OF BREATH: 0
RHINORRHEA: 1
CHILLS: 1
SPUTUM PRODUCTION: 1
WHEEZING: 0
DIZZINESS: 0
FEVER: 1
VOMITING: 0
NAUSEA: 0
MUSCULOSKELETAL NEGATIVE: 1
DIARRHEA: 0
MYALGIAS: 0
ABDOMINAL PAIN: 0
HEMOPTYSIS: 0

## 2020-01-07 ASSESSMENT — COPD QUESTIONNAIRES: COPD: 0

## 2020-01-07 NOTE — PROGRESS NOTES
"Subjective:      Vickey Turner is a 69 y.o. male who presents with Cough (cough, chest congestion, phlegm, difficulty sleeping x 5 days)            Cough   This is a new problem. The current episode started in the past 7 days (5 days). The problem has been unchanged. The problem occurs every few minutes. The cough is productive of sputum. Associated symptoms include chills, a fever, nasal congestion, postnasal drip and rhinorrhea. Pertinent negatives include no chest pain, ear pain, headaches, hemoptysis, myalgias, rash, sore throat, shortness of breath or wheezing. The symptoms are aggravated by lying down. He has tried OTC cough suppressant for the symptoms. The treatment provided mild relief. There is no history of asthma, COPD or pneumonia.     Patient presents to urgent care reporting a 5 day history of productive cough, runny nose, congestion, and subjective fevers at night. No body aches, wheezing, SOB, chest pain, or hemoptysis. No history of chronic lung disease or pneumonia. No history of smoking. He has received all pneumococcal vaccinations. No recent use of antibiotics.     Review of Systems   Constitutional: Positive for chills and fever.   HENT: Positive for congestion, postnasal drip and rhinorrhea. Negative for ear pain and sore throat.    Respiratory: Positive for cough and sputum production. Negative for hemoptysis, shortness of breath and wheezing.    Cardiovascular: Negative for chest pain.   Gastrointestinal: Negative for abdominal pain, diarrhea, nausea and vomiting.   Genitourinary: Negative.    Musculoskeletal: Negative.  Negative for myalgias.   Skin: Negative for rash.   Neurological: Negative for dizziness and headaches.        Objective:     /80 (BP Location: Left arm, Patient Position: Sitting, BP Cuff Size: Adult)   Pulse 80   Temp 36.8 °C (98.2 °F) (Temporal)   Resp 14   Ht 1.753 m (5' 9\")   Wt 68.9 kg (152 lb)   SpO2 98%   BMI 22.45 kg/m²      Physical " Exam  Vitals signs and nursing note reviewed.   Constitutional:       Appearance: Normal appearance. He is well-developed. He is not ill-appearing.   HENT:      Head: Normocephalic and atraumatic.      Right Ear: Hearing, tympanic membrane, ear canal and external ear normal. There is no impacted cerumen.      Left Ear: Hearing, tympanic membrane, ear canal and external ear normal. There is no impacted cerumen.      Nose: Congestion and rhinorrhea present.      Mouth/Throat:      Mouth: Mucous membranes are moist.      Pharynx: No oropharyngeal exudate or posterior oropharyngeal erythema.   Eyes:      Conjunctiva/sclera: Conjunctivae normal.      Pupils: Pupils are equal, round, and reactive to light.   Neck:      Musculoskeletal: Normal range of motion.   Cardiovascular:      Rate and Rhythm: Normal rate and regular rhythm.      Heart sounds: Normal heart sounds. No murmur.   Pulmonary:      Effort: Pulmonary effort is normal.      Breath sounds: Normal breath sounds. No wheezing or rales.      Comments: Dry cough present throughout exam.   Musculoskeletal: Normal range of motion.   Skin:     General: Skin is warm and dry.   Neurological:      Mental Status: He is alert and oriented to person, place, and time.   Psychiatric:         Behavior: Behavior normal.            PMH:  has a past medical history of Chronic back pain, Diverticulitis, and GERD (gastroesophageal reflux disease).  MEDS:   Current Outpatient Medications:   •  guaifenesin-codeine (TUSSI-ORGANIDIN NR) 100-10 MG/5ML syrup, Take 5 mL by mouth 3 times a day as needed for up to 7 days., Disp: 100 mL, Rfl: 0  •  benzonatate (TESSALON) 100 MG Cap, Take 1-2 Caps by mouth 3 times a day as needed., Disp: 60 Cap, Rfl: 0  •  azithromycin (ZITHROMAX) 250 MG Tab, Take 2 tablets on day one, then 1 tablet on days two through five, Disp: 6 Tab, Rfl: 0  •  ALPRAZolam (XANAX) 1 MG Tab, alprazolam 1 mg tablet, Disp: , Rfl:   •  gabapentin (NEURONTIN) 300 MG Cap,  gabapentin 300 mg capsule, Disp: , Rfl:   •  HYDROcodone-acetaminophen (NORCO) 5-325 MG Tab per tablet, hydrocodone 5 mg-acetaminophen 325 mg tablet, Disp: , Rfl:   •  zolpidem (AMBIEN) 10 MG Tab, zolpidem 10 mg tablet, Disp: , Rfl:   •  tizanidine (ZANAFLEX) 4 MG Tab, Take 1 Tab by mouth at bedtime as needed (spasms)., Disp: 30 Tab, Rfl: 3  •  fluticasone (FLONASE) 50 MCG/ACT nasal spray, Spray 1 Spray in nose every day., Disp: 1 Bottle, Rfl: 6  •  raNITidine (ZANTAC) 150 MG Tab, TAKE ONE TABLET BY MOUTH TWICE DAILY FOR REFLUX DISEASE, Disp: 180 Tab, Rfl: 3  •  vitamin D (CHOLECALCIFEROL) 1000 UNIT Tab, Take 1,000 Units by mouth every day., Disp: , Rfl:   ALLERGIES:   Allergies   Allergen Reactions   • Pantoprazole    • Pantoprazole Rash     itching     SURGHX:   Past Surgical History:   Procedure Laterality Date   • COLONOSCOPY - ENDO  11/17/2014    Performed by Moris Stanton D.O. at ENDOSCOPY [facility]     SOCHX:  reports that he has never smoked. He has never used smokeless tobacco. He reports that he does not drink alcohol or use drugs.  FH: family history includes Dementia in his mother; Heart Disease in his father.     Assessment/Plan:       1. Viral URI with cough  - guaifenesin-codeine (TUSSI-ORGANIDIN NR) 100-10 MG/5ML syrup; Take 5 mL by mouth 3 times a day as needed for up to 7 days.  Dispense: 100 mL; Refill: 0   - Will cause sedation, avoid driving, operating heavy machinery, and drinking alcohol  - benzonatate (TESSALON) 100 MG Cap; Take 1-2 Caps by mouth 3 times a day as needed.  Dispense: 60 Cap; Refill: 0  - azithromycin (ZITHROMAX) 250 MG Tab; Take 2 tablets on day one, then 1 tablet on days two through five  Dispense: 6 Tab; Refill: 0    Advised patient symptoms are most likely viral in etiology, recommend supportive care. Increased fluids and rest. Tessalon perles and codeine cough syrup as needed for symptomatic relief. Contingent course of antibiotics provided to start taking if  symptoms persist/worsen. The patient demonstrated a good understanding and agreed with the treatment plan.

## 2020-02-18 NOTE — TELEPHONE ENCOUNTER
[employer]   RASHEED BETANCUR      Age: 69   demographics  Data as of: 02/18/2020                       NARCOTIC  230      SEDATIVE  230      STIMULANT  000      NARxSCORES can range from 000 to 999. The first two digits represent the composite percentile risk based on an overall analysis of prescription drug use. The third digit represents the number of active prescriptions. The distribution of scores in the population is such that approximately 75% fall below 200, 95% fall below 500 and 99% fall below 650. The information on this report is not warranted as accurate or complete. This report is based on the search criteria supplied and the data entered by the dispensing pharmacy. For more information about any prescription, please contact the dispensing pharmacy or the prescriber. NARxSCORES and Reports are intended to aid, not replace medical decision making. None of the information presented should be used as sole justification for providing or refusing to provide medications.       [id number]%[id number]        Rx Graph    Grayed out drugs could not be included in score calculations.      Narcotic    Buprenorphine    Sedative    Stimulant    Other        All PrescribersPrescribers6 - Carmen Mccloud5 - Michelle Morgan4 - Meliton De Paz3 - Ernesto Dorado2 - Ryan Cortés B1 - Jennifer LEDEZMA [id number]      Morphine MgEq (MME)    [id number]/[id number]      Buprenorphine mg    [id number]/543t2r4z0u      Lorazepam MgEq (LME)    [id number]/[id number]      *Per CDC guidance, the MME conversion factors prescribed or provided as part of the medication-assisted treatment for opioid use disorder should not be used to benchmark against dosage thresholds meant for opioids prescribed for pain. Buprenorphine products have no agreed upon morphine equivalency, and as partial opioid agonists, are not expected to be associated with overdose risk in the same dose-dependent manner as doses  for full agonist opioids. MME = morphine milligram equivalents. LME = Lorazepam milligram equivalents. MG = dose in milligrams.         Data Analysis                                                                                                                                                                                   Summary      Total Prescriptions:   24      Total Prescribers:   6      Total Pharmacies:   1        Narcotics* (excluding Buprenorphine)      Current Qty:   0     Current MME/day:   0.00     30 Day Avg MME/day:   0.00       Buprenorphine*      Current Qty:   0     Current mg/day:   0.00     30 Day Avg mg/day:   0.00       Sedatives*      Current Qty:   0     Current LME/day:   0.00     30 Day Avg LME/day:   0.00         *Highlighted drugs could not be included in score calculations     Prescriptions       Total Prescriptions: 24       Total Private Pay: 1        Fill Date     ID      Written    Drug    Qty    Days    Prescriber    Rx #    Pharmacy     Refill      Daily Dose*    Pymt Type             01/07/2020  1   01/07/2020      Virtussin Ac  Mg/5 Ml Lq        100.00 7  [facility]   [id number]    [facility]   [place]  4.29 [place]   12/02/2019  1   09/18/2019      Zolpidem Tartrate 10 MG Tablet        30.00 30  [facility]   [id number]    [facility] (0642[facility]   2  0.50 [place]   10/28/2019  1   09/18/2019      Zolpidem Tartrate 10 MG Tablet        30.00 30  [facility]   [id number]    [facility] (0941[facility]   1  0.50 [place]   09/18/2019  1   09/18/2019      Zolpidem Tartrate 10 MG Tablet        30.00 30  [facility]   [id number]    [facility] (7778[facility]   [place]  0.50 [place]   07/26/2019  1   07/25/2019      Alprazolam 1 MG Tablet        2.00 1  [facility]   [id number]    [facility] (9416[facility]   [place]  4.00 [place]   07/23/2019  1   07/23/2019      Hydrocodone-Acetamin 5-325 MG        40.00 7  [facility]   [id number]    [facility] (0425[facility]   [place]  28.57 [place]   07/02/2019  1   07/02/2019      Hydrocodone-Acetamin 5-325 MG         28.00 7  [facility]   [id number]    [facility] (7525[place]   [place]  20.00 [place]   06/11/2019  1   12/13/2018      Zolpidem Tartrate 10 MG Tablet        30.00 30  Ja [facility]   [id number]    [facility] ([facility]25[facility]   [place]  0.50 [place]   03/16/2019  1   12/13/2018      Zolpidem Tartrate 10 MG Tablet        30.00 30   [facility]   [id number]    [facility] ([facility]25[facility]   [place]  0.50 [place]   01/02/2019  1   01/02/2019      Hydrocodone-Acetamin 5-325 MG        45.00 90   [facility]   [id number]    [facility] (7525[facility]   [place]  2.50 [place]   12/13/2018  1   12/13/2018      Zolpidem Tartrate 10 MG Tablet        30.00 30   [facility]   [id number]    [facility] (7525[facility]   [place]  0.50 [place]   10/18/2018  1   10/18/2018      Hydrocodone-Acetamin 5-325 MG        45.00 10  [facility]   [id number]    [facility] (7525)   [place]  22.50 [place]   09/17/2018  1   09/17/2018      Hydrocodone-Acetamin 5-325 MG        45.00 10   [facility]   [id number]    [facility] ([facility]25[facility]   [place]  22.50 [place]   09/04/2018  1   09/04/2018      Hydrocodone-Acetamin 5-325 MG        60.00 10  [facility]   [id number]    [facility] (7525)   [place]  30.00 [place]   08/20/2018  1   08/20/2018      Hydrocodone-Acetamin 5-325 MG        60.00 10  [facility]   [id number]    [facility] ([facility]25[facility]   [place]  30.00 [place]   08/13/2018  1   08/13/2018      Hydrocodone-Acetamin 5-325 MG        40.00 5  [facility]   [id number]    [facility] ([facility]25[facility]   [place]  40.00 [place]   08/08/2018  1   08/08/2018      Oxycodone Hcl 5 MG Tablet        60.00 5  [facility]   [id number]    [facility] ([facility]25[facility]   [place]  90.00 [place]   08/08/2018  1   08/08/2018      Diazepam 5 MG Tablet        30.00 30  [facility]   [id number]    [facility] ([facility]25[facility]   [place]  0.50 [place]   07/25/2018  1   07/24/2018      Hydrocodone-Acetamin 5-325 MG        42.00 14   [facility]   [id number]    [facility] (4031[facility]   [place]  15.00 [place]   07/17/2018  1   07/17/2018      Hydrocodone-Acetamin 5-325 MG        42.00 7  [facility]   [id number]    [facility] (1293)   0  30.00 [place]   07/03/2018  1   07/03/2018       Hydrocodone-Acetamin 5-325 MG        42.00 7  [facility]   [id number]    [facility] (7525[place]   [place]  30.00 [place]   06/20/2018  1   02/26/2018      Zolpidem Tartrate 10 MG Tablet        30.00 30  [facility]   [id number]    [facility] (7525[facility]   2  0.50 [place]   04/17/2018  1   02/26/2018      Zolpidem Tartrate 10 MG Tablet        30.00 30  [facility]   [id number]    [facility] (7525[facility]   1  0.50 [place]   02/27/2018  1   02/26/2018      Zolpidem Tartrate 10 MG Tablet        30.00 30  [facility]   [id number]    [facility] (7525[facility]   [place]  0.50 [place]     *Per CDC guidance, the MME conversion factors prescribed or provided as part of the medication-assisted treatment for opioid use disorder should not be used to benchmark against dosage thresholds meant for opioids prescribed for pain. Buprenorphine products have no agreed upon morphine equivalency, and as partial opioid agonists, are not expected to be associated with overdose risk in the same dose-dependent manner as doses for full agonist opioids. MME = morphine milligram equivalents. LME = Lorazepam milligram equivalents. MG = dose in milligrams.         Providers    Total Providers: 6       Name    Address    City    State    Zipcode    Phone      Carmen Mccloud    [address]      [place] [address]    Meliton De Paz    [address]      [place] [address]    Ernesto Dorado    [address]      [place] [address]    Jennifer Fraser    [address]      [place] [address]    Ryan Lopes    [address]      [place] [address]    [facility]    [address]      [place] [address]          Pharmacies    Total Pharmacies: 1       Name    Address    City    State    Zipcode    Phone      [facility]  [phone]   [place] [address] [phone]                                                                                                                                                                             [employer]   RASHEED BETANCUR       Age: 69   demographics  Data as of: 02/18/2020                       NARCOTIC  230      SEDATIVE  230      STIMULANT  000      NARxSCORES can range from 000 to 999. The first two digits represent the composite percentile risk based on an overall analysis of prescription drug use. The third digit represents the number of active prescriptions. The distribution of scores in the population is such that approximately 75% fall below 200, 95% fall below 500 and 99% fall below 650. The information on this report is not warranted as accurate or complete. This report is based on the search criteria supplied and the data entered by the dispensing pharmacy. For more information about any prescription, please contact the dispensing pharmacy or the prescriber. NARxSCORES and Reports are intended to aid, not replace medical decision making. None of the information presented should be used as sole justification for providing or refusing to provide medications.       [id number]%[id number]        Rx Graph    Grayed out drugs could not be included in score calculations.      Narcotic    Buprenorphine    Sedative    Stimulant    Other        All PrescribersPrescribers6 - Carmen Mccloud5 - Michelle Morgan4 - Meliton De Paz3 - Ernesto Dorado2 - Ryan Cortés B1 - Jennifer E [id number]      Morphine MgEq (MME)    [id number]/[id number]      Buprenorphine mg    502289Orxizaio40/823f2l9o0k      Lorazepam MgEq (LME)    492672Whqzigjx29/777l2g0b3z      *Per CDC guidance, the MME conversion factors prescribed or provided as part of the medication-assisted treatment for opioid use disorder should not be used to benchmark against dosage thresholds meant for opioids prescribed for pain. Buprenorphine products have no agreed upon morphine equivalency, and as partial opioid agonists, are not expected to be associated with overdose risk in the same dose-dependent manner as doses for full agonist opioids. MME =  morphine milligram equivalents. LME = Lorazepam milligram equivalents. MG = dose in milligrams.         Data Analysis                                                                                                                                                                                   Summary      Total Prescriptions:   24      Total Prescribers:   6      Total Pharmacies:   1        Narcotics* (excluding Buprenorphine)      Current Qty:   0     Current MME/day:   0.00     30 Day Avg MME/day:   0.00       Buprenorphine*      Current Qty:   0     Current mg/day:   0.00     30 Day Avg mg/day:   0.00       Sedatives*      Current Qty:   0     Current LME/day:   0.00     30 Day Avg LME/day:   0.00         *Highlighted drugs could not be included in score calculations     Prescriptions       Total Prescriptions: 24       Total Private Pay: 1        Fill Date     ID      Written    Drug    Qty    Days    Prescriber    Rx #    Pharmacy     Refill      Daily Dose*    Pymt Type             01/07/2020  1   01/07/2020      Virtussin Ac  Mg/5 Ml Lq        100.00 7  [facility]   [id number]    [facility] ([facility]   [place]  4.29 [place]   12/02/2019  1   09/18/2019      Zolpidem Tartrate 10 MG Tablet        30.00 30  [facility]   [id number]    [facility] (7525[facility]   2  0.50 [place]   10/28/2019  1   09/18/2019      Zolpidem Tartrate 10 MG Tablet        30.00 30  [facility]   [id number]    [facility] (7525[facility]   1  0.50 [place]   09/18/2019  1   09/18/2019      Zolpidem Tartrate 10 MG Tablet        30.00 30  [facility]   [id number]    [facility] (7525[facility]   [place]  0.50 [place]   07/26/2019  1   07/25/2019      Alprazolam 1 MG Tablet        2.00 1  [facility]   [id number]    [facility] (7525[facility]   [place]  4.00 [place]   07/23/2019  1   07/23/2019      Hydrocodone-Acetamin 5-325 MG        40.00 7  [facility]   [id number]    [facility] (7525)   0  28.57 [place]   07/02/2019  1   07/02/2019      Hydrocodone-Acetamin 5-325 MG        28.00 7  [facility]   [id number]     [facility] (7525[facility]   [place]  20.00 [place]   06/11/2019  1   12/13/2018      Zolpidem Tartrate 10 MG Tablet        30.00 30  [facility]   [id number]    [facility] (7525[facility]   2  0.50 [place]   03/16/2019  1   12/13/2018      Zolpidem Tartrate 10 MG Tablet        30.00 30  [facility]   [id number]    [facility] (7525)   1  0.50 [place]   01/02/2019  1   01/02/2019      Hydrocodone-Acetamin 5-325 MG        45.00 90  [facility]   [id number]    [facility] (7525[facility]   0  2.50 [place]   12/13/2018  1   12/13/2018      Zolpidem Tartrate 10 MG Tablet        30.00 30  [facility]   [id number]    [facility] (7525)   [place]  0.50 [place]   10/18/2018  1   10/18/2018      Hydrocodone-Acetamin 5-325 MG        45.00 10  Elpidio Carie   [id number]    [facility] (7525[facility]   [place]  22.50 [place]   09/17/2018  1   09/17/2018      Hydrocodone-Acetamin 5-325 MG        45.00 10   [facility]   [id number]    [facility] (7525[facility]   [place]  22.50 [place]   09/04/2018  1   09/04/2018      Hydrocodone-Acetamin 5-325 MG        60.00 10   [facility]   [id number]    [facility] (7525[facility]   [place]  30.00 [place]   08/20/2018  1   08/20/2018      Hydrocodone-Acetamin 5-325 MG        60.00 10  Elpidio Bender   [id number]    [facility] (7525)   [place]  30.00 [place]   08/13/2018  1   08/13/2018      Hydrocodone-Acetamin 5-325 MG        40.00 5  [facility]   [id number]    [facility] (7525[facility]   [place]  40.00 [place]   08/08/2018  1   08/08/2018      Oxycodone Hcl 5 MG Tablet        60.00 5  [facility]   [id number]    [facility] (7525[facility]   [place]  90.00 [place]   08/08/2018  1   08/08/2018      Diazepam 5 MG Tablet        30.00 30  [facility]   [id number]    [facility] (7525[facility]   [place]  0.50 [place]   07/25/2018  1   07/24/2018      Hydrocodone-Acetamin 5-325 MG        42.00 14   [facility]   [id number]    [facility] (7525)   [place]  15.00 [place]   07/17/2018  1   07/17/2018      Hydrocodone-Acetamin 5-325 MG        42.00 7  [facility]   [id number]    [facility] (7525)   0  30.00 [place]   07/03/2018  1   07/03/2018      Hydrocodone-Acetamin 5-325 MG        42.00  7  [facility]   [id number]    [facility] (2796)   0  30.00 [place]   06/20/2018  1   02/26/2018      Zolpidem Tartrate 10 MG Tablet        30.00 30  [facility]   [id number]    [facility] (7525)   2  0.50 [place]   04/17/2018  1   02/26/2018      Zolpidem Tartrate 10 MG Tablet        30.00 30  [facility]   [id number]    [facility] (3825)   1  0.50 [place]   02/27/2018  1   02/26/2018      Zolpidem Tartrate 10 MG Tablet        30.00 30  [facility]   [id number]    [facility] (25)   [place]  0.50 [place]     *Per CDC guidance, the MME conversion factors prescribed or provided as part of the medication-assisted treatment for opioid use disorder should not be used to benchmark against dosage thresholds meant for opioids prescribed for pain. Buprenorphine products have no agreed upon morphine equivalency, and as partial opioid agonists, are not expected to be associated with overdose risk in the same dose-dependent manner as doses for full agonist opioids. MME = morphine milligram equivalents. LME = Lorazepam milligram equivalents. MG = dose in milligrams.         Providers    Total Providers: 6       Name    Address    City    State    Zipcode    Phone      Carmen Mccloud    [address]      [place] [address]    Meliton De Paz    [address]      [place] [address]    Ernesto Dorado    [address]      [place] [address]    Jennifer Fraser    [address]      [place] [address]    Ryan Cortés [facility]    [address]      [place] [address]    Michelle Morgan    [address]      [place] [address]          Pharmacies    Total Pharmacies: 1       Name    Address    City    State    Zipcode    Phone      [facility]  [phone] [facility]   [place] [address] [phone]

## 2020-02-18 NOTE — TELEPHONE ENCOUNTER
Received request via: Pharmacy    Was the patient seen in the last year in this department? Yes    Does the patient have an active prescription (recently filled or refills available) for medication(s) requested? No     RASHEED BETANCUR     Age: 69   demographics   Data as of: 02/18/2020         NARCOTIC 230    SEDATIVE 230    STIMULANT 000    NARxSCORES can range from 000 to 999. The first two digits represent the composite percentile risk based on an overall analysis of prescription drug use. The third digit represents the number of active prescriptions. The distribution of scores in the population is such that approximately 75% fall below 200, 95% fall below 500 and 99% fall below 650. The information on this report is not warranted as accurate or complete. This report is based on the search criteria supplied and the data entered by the dispensing pharmacy. For more information about any prescription, please contact the dispensing pharmacy or the prescriber. NARxSCORES and Reports are intended to aid, not replace medical decision making. None of the information presented should be used as sole justification for providing or refusing to provide medications.   [id number]%99%[id number]  Rx Graph    Grayed out drugs could not be included in score calculations.  [x]  Narcotic  [x]  Buprenorphine  [x]  Sedative  [x]  Stimulant  [x]  Other    All PrescribersPrescribers6 - Carmen Mccloud5 - Michelle Morgan4 - Mleiton De Paz3 - Ernesto Dorado2 - Ryan Cortés B1 - Jennifer LEDEZMA [id number]  Morphine MgEq (MME)  [id number]/187y6r3c1s  Buprenorphine mg  202195Dtqrkauk24/535b3b2f8p  Lorazepam MgEq (LME)  888319Ckrmjrrq55/694o8a5m2t  *Per CDC guidance, the MME conversion factors prescribed or provided as part of the medication-assisted treatment for opioid use disorder should not be used to benchmark against dosage thresholds meant for opioids prescribed for pain. Buprenorphine products have no agreed  upon morphine equivalency, and as partial opioid agonists, are not expected to be associated with overdose risk in the same dose-dependent manner as doses for full agonist opioids. MME = morphine milligram equivalents. LME = Lorazepam milligram equivalents. MG = dose in milligrams.   Data Analysis                                                                                                             Summary  Total Prescriptions:    24    Total Prescribers:    6    Total Pharmacies:    1    Narcotics* (excluding Buprenorphine)  Current Qty:   0   Current MME/day:   0.00   30 Day Avg MME/day:   0.00   Buprenorphine*  Current Qty:   0   Current mg/day:   0.00   30 Day Avg mg/day:   0.00   Sedatives*  Current Qty:   0   Current LME/day:   0.00   30 Day Avg LME/day:   0.00   *Highlighted drugs could not be included in score calculations   Prescriptions  Total Prescriptions: 24    Total Private Pay: 1    Fill Date ID   Written Drug Qty Days Prescriber Rx # Pharmacy Refill   Daily Dose* Pymt Type      01/07/2020  1   01/07/2020  Virtussin Ac  Mg/5 Ml Lq  100.00 7 [facility]   [id number]   [facility] ([facility]25[facility]   [place]  4.29 [place]   12/02/2019  1   09/18/2019  Zolpidem Tartrate 10 MG Tablet  30.00 30 Ja Carie   [id number]   [facility] (7525)   2  0.50 [place]   10/28/2019  1   09/18/2019  Zolpidem Tartrate 10 MG Tablet  30.00 30 Ja Carie   [id number]   [facility] (7525)   1  0.50 [place]   09/18/2019  1   09/18/2019  Zolpidem Tartrate 10 MG Tablet  30.00 30 Ja Carie   [id number]   [facility] (7525)   0  0.50 [place]   07/26/2019  1   07/25/2019  Alprazolam 1 MG Tablet  2.00 1 [facility]   [id number]   [facility] (7525)   0  4.00 [place]   07/23/2019  1   07/23/2019  Hydrocodone-Acetamin 5-325 MG  40.00 7 [facility]   [id number]   [facility] (7525)   0  28.57 [place]   07/02/2019  1   07/02/2019  Hydrocodone-Acetamin 5-325 MG  28.00 7 Ja Carie   [id number]   [facility] (7525)   0  20.00 [place]   06/11/2019  1   12/13/2018   Zolpidem Tartrate 10 MG Tablet  30.00 30  [facility]   [id number]   [facility] ([facility]25[facility]   2  0.50 [place]   03/16/2019  1   12/13/2018  Zolpidem Tartrate 10 MG Tablet  30.00 30 Ja Carie   [id number]   [facility] (7525)   1  0.50 [place]   01/02/2019  1   01/02/2019  Hydrocodone-Acetamin 5-325 MG  45.00 90 Ja Carie   [id number]   [facility] (7525)   0  2.50 [place]   12/13/2018  1   12/13/2018  Zolpidem Tartrate 10 MG Tablet  30.00 30 Ja Carie   [id number]   [facility] (7525)   [place]  0.50 [place]   10/18/2018  1   10/18/2018  Hydrocodone-Acetamin 5-325 MG  45.00 10  [facility]   [id number]   [facility] (7525)   0  22.50 [place]   09/17/2018  1   09/17/2018  Hydrocodone-Acetamin 5-325 MG  45.00 10  [facility]   [id number]   [facility] (7525)   [place]  22.50 [place]   09/04/2018  1   09/04/2018  Hydrocodone-Acetamin 5-325 MG  60.00 10  [facility]   [id number]   [facility] ([facility]25)   [place]  30.00 [place]   08/20/2018  1   08/20/2018  Hydrocodone-Acetamin 5-325 MG  60.00 10 Elpidio Bender   [id number]   [facility] (7525)   [place]  30.00 [place]   08/13/2018  1   08/13/2018  Hydrocodone-Acetamin 5-325 MG  40.00 5 Re [facility]   [id number]   [facility] (7525)   [place]  40.00 [place]   08/08/2018  1   08/08/2018  Diazepam 5 MG Tablet  30.00 30 [facility]   [id number]   [facility] (7525)   [place]  0.50 [place]   08/08/2018  1   08/08/2018  Oxycodone Hcl 5 MG Tablet  60.00 5 Re [facility]   [id number]   [facility] ([facility]25)   0  90.00 [place]   07/25/2018  1   07/24/2018  Hydrocodone-Acetamin 5-325 MG  42.00 14 Elpidio Carie   [id number]   [facility] (7525)   [place]  15.00 [place]   07/17/2018  1   07/17/2018  Hydrocodone-Acetamin 5-325 MG  42.00 7 Ke Jef   [id number]   [facility] (1014)   0  30.00 [place]   07/03/2018  1   07/03/2018  Hydrocodone-Acetamin 5-325 MG  42.00 7 Ja Carie   [id number]   [facility] (4565)   [place]  30.00 [place]   06/20/2018  1   02/26/2018  Zolpidem Tartrate 10 MG Tablet  30.00 30 Ja Carie   [id number]   [facility] (7446)   2  0.50 L[place]   04/17/2018  1   02/26/2018  Zolpidem  Tartrate 10 MG Tablet  30.00 30 [facility]   [id number]   [facility] (9886)   1  0.50 L[place]   02/27/2018  1   02/26/2018  Zolpidem Tartrate 10 MG Tablet  30.00 30 [facility]   [id number]   [facility] (7839)   0  0.50 L[place]   *Per CDC guidance, the MME conversion factors prescribed or provided as part of the medication-assisted treatment for opioid use disorder should not be used to benchmark against dosage thresholds meant for opioids prescribed for pain. Buprenorphine products have no agreed upon morphine equivalency, and as partial opioid agonists, are not expected to be associated with overdose risk in the same dose-dependent manner as doses for full agonist opioids. MME = morphine milligram equivalents. LME = Lorazepam milligram equivalents. MG = dose in milligrams.   Providers  Total Providers: 6   Name Address City State Zipcode Phone   Carmen Mccloud [address]   [place] [address]    Michelle Morgan [address]   [place] [address]    Meliton De Paz [address]   [place] [address]    Ernesto Dorado [address]   [place] [address]    Jennifer Fraser [address] [facility] [address] [facility]   [place] [address]    [facility] [address]   [place] [address]    Pharmacies  Total Pharmacies: 1   Name Address City State Zipcode Phone   [facility]  [phone]) [address]   [place] [address] [phone]

## 2020-02-19 RX ORDER — ZOLPIDEM TARTRATE 10 MG/1
TABLET ORAL
Qty: 30 EACH | Refills: 0
Start: 2020-02-19 | End: 2020-03-20

## 2020-02-19 RX ORDER — ZOLPIDEM TARTRATE 10 MG/1
TABLET ORAL
Qty: 30 TAB | Refills: 0 | OUTPATIENT
Start: 2020-02-19

## 2020-02-27 ENCOUNTER — APPOINTMENT (RX ONLY)
Dept: URBAN - METROPOLITAN AREA CLINIC 20 | Facility: CLINIC | Age: 70
Setting detail: DERMATOLOGY
End: 2020-02-27

## 2020-02-27 DIAGNOSIS — L57.0 ACTINIC KERATOSIS: ICD-10-CM | Status: IMPROVED

## 2020-02-27 DIAGNOSIS — L82.1 OTHER SEBORRHEIC KERATOSIS: ICD-10-CM

## 2020-02-27 DIAGNOSIS — D17 BENIGN LIPOMATOUS NEOPLASM: ICD-10-CM

## 2020-02-27 DIAGNOSIS — L72.8 OTHER FOLLICULAR CYSTS OF THE SKIN AND SUBCUTANEOUS TISSUE: ICD-10-CM

## 2020-02-27 PROBLEM — D48.5 NEOPLASM OF UNCERTAIN BEHAVIOR OF SKIN: Status: ACTIVE | Noted: 2020-02-27

## 2020-02-27 PROCEDURE — ? COUNSELING

## 2020-02-27 PROCEDURE — 17000 DESTRUCT PREMALG LESION: CPT

## 2020-02-27 PROCEDURE — ? OBSERVATION AND MEASURE

## 2020-02-27 PROCEDURE — ? LIQUID NITROGEN

## 2020-02-27 PROCEDURE — 99214 OFFICE O/P EST MOD 30 MIN: CPT | Mod: 25

## 2020-02-27 PROCEDURE — ? ADDITIONAL NOTES

## 2020-02-27 ASSESSMENT — LOCATION SIMPLE DESCRIPTION DERM
LOCATION SIMPLE: RIGHT CHEEK
LOCATION SIMPLE: RIGHT HAND
LOCATION SIMPLE: LEFT ANTERIOR NECK
LOCATION SIMPLE: RIGHT FOREARM
LOCATION SIMPLE: LEFT LOWER BACK
LOCATION SIMPLE: LEFT CHEEK
LOCATION SIMPLE: SUPERIOR FOREHEAD
LOCATION SIMPLE: LEFT FOREARM
LOCATION SIMPLE: RIGHT LOWER BACK
LOCATION SIMPLE: SCALP
LOCATION SIMPLE: LEFT HAND
LOCATION SIMPLE: RIGHT NOSE

## 2020-02-27 ASSESSMENT — LOCATION DETAILED DESCRIPTION DERM
LOCATION DETAILED: RIGHT CENTRAL MALAR CHEEK
LOCATION DETAILED: RIGHT RADIAL DORSAL HAND
LOCATION DETAILED: SUPERIOR MID FOREHEAD
LOCATION DETAILED: LEFT ULNAR DORSAL HAND
LOCATION DETAILED: LEFT CLAVICULAR NECK
LOCATION DETAILED: LEFT SUPERIOR MEDIAL LOWER BACK
LOCATION DETAILED: RIGHT DISTAL DORSAL FOREARM
LOCATION DETAILED: LEFT DISTAL DORSAL FOREARM
LOCATION DETAILED: RIGHT NASAL ALA
LOCATION DETAILED: RIGHT SUPERIOR POSTAURICULAR SKIN
LOCATION DETAILED: LEFT CENTRAL MALAR CHEEK
LOCATION DETAILED: RIGHT SUPERIOR MEDIAL LOWER BACK

## 2020-02-27 ASSESSMENT — LOCATION ZONE DERM
LOCATION ZONE: FACE
LOCATION ZONE: TRUNK
LOCATION ZONE: NOSE
LOCATION ZONE: ARM
LOCATION ZONE: HAND
LOCATION ZONE: SCALP
LOCATION ZONE: NECK

## 2020-02-27 NOTE — HPI: SKIN LESION
Is This A New Presentation, Or A Follow-Up?: Skin Lesion
Has Your Skin Lesion Been Treated?: not been treated
Is This A New Presentation, Or A Follow-Up?: Growth
What Type Of Note Output Would You Prefer (Optional)?: Bullet Format
How Severe Is Your Skin Lesion?: mild

## 2020-02-27 NOTE — PROCEDURE: LIQUID NITROGEN
Render Note In Bullet Format When Appropriate: No
Duration Of Freeze Thaw-Cycle (Seconds): 0
Post-Care Instructions: I reviewed with the patient in detail post-care instructions. Patient is to wear sunprotection, and avoid picking at any of the treated lesions. Pt may apply Vaseline to crusted or scabbing areas.
Consent: The patient's consent was obtained including but not limited to risks of crusting, scabbing, blistering, scarring, darker or lighter pigmentary change, recurrence, incomplete removal and infection.
Detail Level: Detailed
no dysuria, no frequency, and no hematuria. no urinary incontinence.

## 2020-02-27 NOTE — PROCEDURE: ADDITIONAL NOTES
Additional Notes: Authorization for [facility] initiated today
Detail Level: Simple
Additional Notes: Schedule for excision with dr. Burroughs.

## 2020-03-23 RX ORDER — BUTALBITAL, ACETAMINOPHEN AND CAFFEINE 50; 325; 40 MG/1; MG/1; MG/1
TABLET ORAL
Qty: 30 TAB | Refills: 0 | Status: SHIPPED
Start: 2020-03-23 | End: 2020-04-22

## 2020-04-30 DIAGNOSIS — J06.9 ACUTE URI: ICD-10-CM

## 2020-04-30 RX ORDER — FLUTICASONE PROPIONATE 50 MCG
SPRAY, SUSPENSION (ML) NASAL
Qty: 16 G | Refills: 0 | OUTPATIENT
Start: 2020-04-30

## 2020-04-30 RX ORDER — FLUTICASONE PROPIONATE 50 MCG
1 SPRAY, SUSPENSION (ML) NASAL DAILY
Qty: 1 BOTTLE | Refills: 0 | OUTPATIENT
Start: 2020-04-30

## 2020-04-30 RX ORDER — ZOLPIDEM TARTRATE 10 MG/1
TABLET ORAL
Qty: 30 TAB | Refills: 0 | OUTPATIENT
Start: 2020-04-30 | End: 2020-05-30

## 2020-04-30 NOTE — TELEPHONE ENCOUNTER
Received request via: Patient    Was the patient seen in the last year in this department? Yes    Does the patient have an active prescription (recently filled or refills available) for medication(s) requested? No       NARCOTIC 210    SEDATIVE 210    STIMULANT 000    NARxSCORES can range from 000 to 999. The first two digits represent the composite percentile risk based on an overall analysis of prescription drug use. The third digit represents the number of active prescriptions. The distribution of scores in the population is such that approximately 75% fall below 200, 95% fall below 500 and 99% fall below 650. The information on this report is not warranted as accurate or complete. This report is based on the search criteria supplied and the data entered by the dispensing pharmacy. For more information about any prescription, please contact the dispensing pharmacy or the prescriber. NARxSCORES and Reports are intended to aid, not replace medical decision making. None of the information presented should be used as sole justification for providing or refusing to provide medications.   [id number]  Rx Graph    Grayed out drugs could not be included in score calculations.  [x]  Narcotic  [x]  Buprenorphine  [x]  Sedative  [x]  Stimulant  [x]  Other    All PrescribersPrescribers6 - Ryan Cortés B5 - Jennifer Fraser4 - Ernesto Dorado3 - Meliton De Paz2 - Michelle Morgan1 - Carmen RENAE [id number]  Morphine MgEq (MME)  [id number]  Buprenorphine mg  [id number]  Lorazepam MgEq (LME)  [id number]  *Per CDC guidance, the MME conversion factors prescribed or provided as part of the medication-assisted treatment for opioid use disorder should not be used to benchmark against dosage thresholds meant for opioids prescribed for pain. Buprenorphine products have no agreed upon morphine equivalency, and as partial opioid agonists, are not  expected to be associated with overdose risk in the same dose-dependent manner as doses for full agonist opioids. MME = morphine milligram equivalents. LME = Lorazepam milligram equivalents. MG = dose in milligrams.   Data Analysis                                                                                                      Summary  Total Prescriptions:    23    Total Prescribers:    6    Total Pharmacies:    1    Narcotics* (excluding Buprenorphine)  Current Qty:   0   Current MME/day:   0.00   30 Day Avg MME/day:   0.00   Sedatives*  Current Qty:   0   Current LME/day:   0.00   30 Day Avg LME/day:   0.00   Buprenorphine*  Current Qty:   0   Current mg/day:   0.00   30 Day Avg mg/day:   0.00   *Highlighted drugs could not be included in score calculations   Prescriptions  Total Prescriptions: 23    Total Private Pay: 1    Fill Date ID   Written Drug Qty Days Prescriber Rx # Pharmacy Refill   Daily Dose* Pymt Type      02/18/2020  1   09/19/2019  Zolpidem Tartrate 10 MG Tablet  30.00 30 [facility]   [id number]   [facility] ([facility]30[place]   0  0.50 [place]   01/07/2020  1   01/07/2020  Virtussin Ac  Mg/5 Ml Lq  100.00 7 [facility]   [id number]   [facility] (7525)   [place]  4.29 [place]   12/02/2019  1   09/18/2019  Zolpidem Tartrate 10 MG Tablet  30.00 30 [facility]   [id number]   [facility] (7525)   2  0.50 [place]   10/28/2019  1   09/18/2019  Zolpidem Tartrate 10 MG Tablet  30.00 30 [facility]   [id number]   [facility] (7525)   1  0.50 [place]   09/18/2019  1   09/18/2019  Zolpidem Tartrate 10 MG Tablet  30.00 30 [facility]   [id number]   [facility] (7525)   [place]  0.50 [place]   07/26/2019  1   07/25/2019  Alprazolam 1 MG Tablet  2.00 1 [facility]   [id number]   [facility] (8025[place]   [place]  4.00 [place]   07/23/2019  1   07/23/2019  Hydrocodone-Acetamin 5-325 MG  40.00 7 [facility]   [id number]   [facility] (1825[place]   [place]  28.57 [place]   07/02/2019  1   07/02/2019  Hydrocodone-Acetamin 5-325 MG  28.00 7 [facility]   [id number]   Merged with [facility] (4960)   0  20.00  [place]   06/11/2019  1   12/13/2018  Zolpidem Tartrate 10 MG Tablet  30.00 30 Ja Carie   [id number]   [facility] (7525[facility]   2  0.50 [place]   03/16/2019  1   12/13/2018  Zolpidem Tartrate 10 MG Tablet  30.00 30 Ja Carie   [id number]   [facility] (7525[place]   1  0.50 [place]   01/02/2019  1   01/02/2019  Hydrocodone-Acetamin 5-325 MG  45.00 90 Ja Carie   [id number]   [facility] (7525[facility]   0  2.50 [place]   12/13/2018  1   12/13/2018  Zolpidem Tartrate 10 MG Tablet  30.00 30 Ja Carie   [id number]   [facility] (7525[facility]   [place]  0.50 [place]   10/18/2018  1   10/18/2018  Hydrocodone-Acetamin 5-325 MG  45.00 10 Elpidio Carie   [id number]   [facility] (7525[facility]   [place]  22.50 [place]   09/17/2018  1   09/17/2018  Hydrocodone-Acetamin 5-325 MG  45.00 10 Ja Carie   [id number]   [facility] (7525[place]   [place]  22.50 [place]   09/04/2018  1   09/04/2018  Hydrocodone-Acetamin 5-325 MG  60.00 10 Ja Carie   [id number]   [facility] (7525[place]   [place]  30.00 [place]   08/20/2018  1   08/20/2018  Hydrocodone-Acetamin 5-325 MG  60.00 10 Elpidio Bender   [id number]   [facility] (7525[place]   [place]  30.00 [place]   08/13/2018  1   08/13/2018  Hydrocodone-Acetamin 5-325 MG  40.00 5 Re Cov   [id number]   [facility] (7525[place]   [place]  40.00 [place]   08/08/2018  1   08/08/2018  Diazepam 5 MG Tablet  30.00 30 Re Cov   [id number]   [facility] ([facility]25[facility]   [place]  0.50 [place]   08/08/2018  1   08/08/2018  Oxycodone Hcl 5 MG Tablet  60.00 5 Re Cov   [id number]   [facility] (7525[facility]   0  90.00 [place]   07/25/2018  1   07/24/2018  Hydrocodone-Acetamin 5-325 MG  42.00 14 Ja Carie   [id number]   [facility] (7525)   0  15.00 [place]   07/17/2018  1   07/17/2018  Hydrocodone-Acetamin 5-325 MG  42.00 7 Ke Jef   [id number]   [facility] (8875)   0  30.00 [place]   07/03/2018  1   07/03/2018  Hydrocodone-Acetamin 5-325 MG  42.00 7 Ja Carie   [id number]   [facility] (0852)   0  30.00 [place]   06/20/2018  1   02/26/2018  Zolpidem Tartrate 10 MG Tablet  30.00 30 Elpidio Carie   [id number]   [facility] (2416)   2  0.50 [place]  [place]   *Per CDC guidance, the MME conversion factors prescribed or provided as part of the medication-assisted treatment for opioid use disorder should not be used to benchmark against dosage thresholds meant for opioids prescribed for pain. Buprenorphine products have no agreed upon morphine equivalency, and as partial opioid agonists, are not expected to be associated with overdose risk in the same dose-dependent manner as doses for full agonist opioids. MME = morphine milligram equivalents. LME = Lorazepam milligram equivalents. MG = dose in milligrams.   Providers  Total Providers: 6   Name Address City State Zipcode Phone   Carmen Mccloud [address]   [place] [address]    Michelle Morgan [address]   [place] [address]    Meliton De Paz [address]   [place] [address]    Ernesto Dorado [address]   [place] [address]    Jennifer Fraser [address] [facility] [place] [address]    Ryan Lopes [address]   [place] [address]    Pharmacies  Total Pharmacies: 1   Name Address City State Zipcode Phone   [facility]  [phone] [address] [place] [address] [place] [address]1693 [phone]

## 2020-05-07 ENCOUNTER — OFFICE VISIT (OUTPATIENT)
Dept: MEDICAL GROUP | Facility: PHYSICIAN GROUP | Age: 70
End: 2020-05-07
Payer: MEDICARE

## 2020-05-07 DIAGNOSIS — J06.9 ACUTE URI: ICD-10-CM

## 2020-05-07 DIAGNOSIS — J30.2 SEASONAL ALLERGIES: ICD-10-CM

## 2020-05-07 DIAGNOSIS — F10.21 ALCOHOL DEPENDENCE IN REMISSION (HCC): ICD-10-CM

## 2020-05-07 DIAGNOSIS — G43.009 MIGRAINE WITHOUT AURA AND WITHOUT STATUS MIGRAINOSUS, NOT INTRACTABLE: ICD-10-CM

## 2020-05-07 DIAGNOSIS — E78.5 HYPERLIPIDEMIA, UNSPECIFIED HYPERLIPIDEMIA TYPE: ICD-10-CM

## 2020-05-07 DIAGNOSIS — Z12.5 SCREENING FOR PROSTATE CANCER: ICD-10-CM

## 2020-05-07 DIAGNOSIS — F51.01 PRIMARY INSOMNIA: ICD-10-CM

## 2020-05-07 DIAGNOSIS — K70.9 ALCOHOLIC LIVER DISEASE, UNSPECIFIED (HCC): ICD-10-CM

## 2020-05-07 PROBLEM — N40.0 BENIGN PROSTATIC HYPERPLASIA: Status: ACTIVE | Noted: 2019-12-05

## 2020-05-07 PROCEDURE — 99442 PR PHYSICIAN TELEPHONE EVALUATION 11-20 MIN: CPT | Performed by: NURSE PRACTITIONER

## 2020-05-07 RX ORDER — ZOLPIDEM TARTRATE 10 MG/1
10 TABLET ORAL NIGHTLY PRN
Qty: 30 TAB | Refills: 2 | Status: SHIPPED | OUTPATIENT
Start: 2020-05-07 | End: 2020-08-05

## 2020-05-07 ASSESSMENT — PATIENT HEALTH QUESTIONNAIRE - PHQ9: CLINICAL INTERPRETATION OF PHQ2 SCORE: 0

## 2020-05-07 NOTE — ASSESSMENT & PLAN NOTE
Chronic in nature.  Stable.  Patient states that he is using Ambien as needed states that it works well and helps him fall asleep and stay asleep.  Patient does report excellent sleep hygiene.  States that he goes to bed around the same time every evening.  Denies side effects from Ambien and is aware of the risks of the medication.

## 2020-05-07 NOTE — PROGRESS NOTES
Telephone Appointment Visit   As a means of avoiding spread of COVID-19, this visit is being conducted by telephone. This telephone visit was initiated by the patient and they verbally consented.    Time at start of call: 0733    Reason for Call:  Medication Follow-up    Patient Comments / History:   Seasonal allergies  Chronic in nature.  Stable.  Patient states he is doing well and continuing Flonase.    Insomnia  Chronic in nature.  Stable.  Patient states that he is using Ambien as needed states that it works well and helps him fall asleep and stay asleep.  Patient does report excellent sleep hygiene.  States that he goes to bed around the same time every evening.  Denies side effects from Ambien and is aware of the risks of the medication.    Alcohol dependence in remission (HCC)  Chronic in nature.  Stable.  Patient has had no desire to drink.  Patient has been alcohol free for 7 years.        Labs / Images Reviewed   Labs ordered for update.    Assessment and Plan:     1. Primary insomnia  Continue medication.    3. Screening for prostate cancer  Lab ordered for update    4. Alcoholic liver disease, unspecified (HCC)  Patient plans to continue sobriety.  Labs ordered to update.    5. Seasonal allergies  Continue Flonase as needed.    6. Migraine without aura and without status migrainosus, not intractable  Continue current medication.  Patient states migraines are stable.    8. Alcohol dependence in remission (HCC)  Labs ordered for update patient has been sober 7 years.    Follow-up: Return in about 5 months (around 10/7/2020), or if symptoms worsen or fail to improve.    Time at end of call: 5661  Total Time Spent: 11-20 minutes    MATT [email or web address].

## 2020-05-07 NOTE — ASSESSMENT & PLAN NOTE
Chronic in nature.  Stable.  Patient has had no desire to drink.  Patient has been alcohol free for 7 years.

## 2020-05-08 ENCOUNTER — TELEPHONE (OUTPATIENT)
Dept: MEDICAL GROUP | Facility: PHYSICIAN GROUP | Age: 70
End: 2020-05-08

## 2020-05-08 DIAGNOSIS — E78.5 HYPERLIPIDEMIA, UNSPECIFIED HYPERLIPIDEMIA TYPE: ICD-10-CM

## 2020-05-08 DIAGNOSIS — Z12.5 SCREENING FOR PROSTATE CANCER: ICD-10-CM

## 2020-05-08 DIAGNOSIS — K70.9 ALCOHOLIC LIVER DISEASE, UNSPECIFIED (HCC): ICD-10-CM

## 2020-05-08 NOTE — TELEPHONE ENCOUNTER
VOICEMAIL  1. Caller Name: Vickey Turner                      Call Back Number: [phone] (home) [phone] (work)    2. Message: Patient LVM, he went to the lab this morning to get his blood work done that was discussed at his appt but there were no orders in Epic. He asked to please have them ordered.     3. Patient approves office to leave a detailed voicemail/MyChart message: N\A

## 2020-05-08 NOTE — TELEPHONE ENCOUNTER
Received request via: Pharmacy    Was the patient seen in the last year in this department? Yes    Does the patient have an active prescription (recently filled or refills available) for medication(s) requested? No     Future Appointments       Provider Department [place]    5/19/2020 6:15 AM [facility] [place]

## 2020-05-09 ENCOUNTER — HOSPITAL ENCOUNTER (OUTPATIENT)
Dept: LAB | Facility: MEDICAL CENTER | Age: 70
End: 2020-05-09
Attending: NURSE PRACTITIONER
Payer: MEDICARE

## 2020-05-09 DIAGNOSIS — Z12.5 SCREENING FOR PROSTATE CANCER: ICD-10-CM

## 2020-05-09 DIAGNOSIS — E78.5 HYPERLIPIDEMIA, UNSPECIFIED HYPERLIPIDEMIA TYPE: ICD-10-CM

## 2020-05-09 DIAGNOSIS — K70.9 ALCOHOLIC LIVER DISEASE, UNSPECIFIED (HCC): ICD-10-CM

## 2020-05-09 LAB
ALBUMIN SERPL BCP-MCNC: 4.7 G/DL (ref 3.2–4.9)
ALBUMIN/GLOB SERPL: 1.7 G/DL
ALP SERPL-CCNC: 80 U/L (ref 30–99)
ALT SERPL-CCNC: 16 U/L (ref 2–50)
ANION GAP SERPL CALC-SCNC: 15 MMOL/L (ref 7–16)
AST SERPL-CCNC: 26 U/L (ref 12–45)
BASOPHILS # BLD AUTO: 0.7 % (ref 0–1.8)
BASOPHILS # BLD: 0.04 K/UL (ref 0–0.12)
BILIRUB SERPL-MCNC: 0.5 MG/DL (ref 0.1–1.5)
BUN SERPL-MCNC: 10 MG/DL (ref 8–22)
CALCIUM SERPL-MCNC: 9.5 MG/DL (ref 8.5–10.5)
CHLORIDE SERPL-SCNC: 100 MMOL/L (ref 96–112)
CHOLEST SERPL-MCNC: 224 MG/DL (ref 100–199)
CO2 SERPL-SCNC: 25 MMOL/L (ref 20–33)
CREAT SERPL-MCNC: 0.9 MG/DL (ref 0.5–1.4)
EOSINOPHIL # BLD AUTO: 0.16 K/UL (ref 0–0.51)
EOSINOPHIL NFR BLD: 2.9 % (ref 0–6.9)
ERYTHROCYTE [DISTWIDTH] IN BLOOD BY AUTOMATED COUNT: 49.1 FL (ref 35.9–50)
FASTING STATUS PATIENT QL REPORTED: NORMAL
GLOBULIN SER CALC-MCNC: 2.7 G/DL (ref 1.9–3.5)
GLUCOSE SERPL-MCNC: 70 MG/DL (ref 65–99)
HCT VFR BLD AUTO: 46.3 % (ref 42–52)
HDLC SERPL-MCNC: 79 MG/DL
HGB BLD-MCNC: 15.2 G/DL (ref 14–18)
IMM GRANULOCYTES # BLD AUTO: 0.01 K/UL (ref 0–0.11)
IMM GRANULOCYTES NFR BLD AUTO: 0.2 % (ref 0–0.9)
LDLC SERPL CALC-MCNC: 127 MG/DL
LYMPHOCYTES # BLD AUTO: 1.65 K/UL (ref 1–4.8)
LYMPHOCYTES NFR BLD: 29.7 % (ref 22–41)
MCH RBC QN AUTO: 33.7 PG (ref 27–33)
MCHC RBC AUTO-ENTMCNC: 32.8 G/DL (ref 33.7–35.3)
MCV RBC AUTO: 102.7 FL (ref 81.4–97.8)
MONOCYTES # BLD AUTO: 0.54 K/UL (ref 0–0.85)
MONOCYTES NFR BLD AUTO: 9.7 % (ref 0–13.4)
NEUTROPHILS # BLD AUTO: 3.15 K/UL (ref 1.82–7.42)
NEUTROPHILS NFR BLD: 56.8 % (ref 44–72)
NRBC # BLD AUTO: 0 K/UL
NRBC BLD-RTO: 0 /100 WBC
PLATELET # BLD AUTO: 200 K/UL (ref 164–446)
PMV BLD AUTO: 10.4 FL (ref 9–12.9)
POTASSIUM SERPL-SCNC: 4.6 MMOL/L (ref 3.6–5.5)
PROT SERPL-MCNC: 7.4 G/DL (ref 6–8.2)
PSA SERPL-MCNC: 1.43 NG/ML (ref 0–4)
RBC # BLD AUTO: 4.51 M/UL (ref 4.7–6.1)
SODIUM SERPL-SCNC: 140 MMOL/L (ref 135–145)
TRIGL SERPL-MCNC: 88 MG/DL (ref 0–149)
WBC # BLD AUTO: 5.6 K/UL (ref 4.8–10.8)

## 2020-05-09 PROCEDURE — 84153 ASSAY OF PSA TOTAL: CPT | Mod: GA

## 2020-05-09 PROCEDURE — 80053 COMPREHEN METABOLIC PANEL: CPT

## 2020-05-09 PROCEDURE — 85025 COMPLETE CBC W/AUTO DIFF WBC: CPT

## 2020-05-09 PROCEDURE — 36415 COLL VENOUS BLD VENIPUNCTURE: CPT | Mod: GA

## 2020-05-09 PROCEDURE — 80061 LIPID PANEL: CPT

## 2020-05-11 RX ORDER — FLUTICASONE PROPIONATE 50 MCG
SPRAY, SUSPENSION (ML) NASAL
Qty: 16 G | Refills: 0 | Status: SHIPPED | OUTPATIENT
Start: 2020-05-11 | End: 2020-10-22

## 2020-06-24 ENCOUNTER — APPOINTMENT (RX ONLY)
Dept: URBAN - METROPOLITAN AREA CLINIC 4 | Facility: CLINIC | Age: 70
Setting detail: DERMATOLOGY
End: 2020-06-24

## 2020-06-24 DIAGNOSIS — L72.8 OTHER FOLLICULAR CYSTS OF THE SKIN AND SUBCUTANEOUS TISSUE: ICD-10-CM

## 2020-06-24 PROCEDURE — ? EXCISION

## 2020-06-24 PROCEDURE — 11422 EXC H-F-NK-SP B9+MARG 1.1-2: CPT

## 2020-06-24 PROCEDURE — 13121 CMPLX RPR S/A/L 2.6-7.5 CM: CPT

## 2020-06-24 ASSESSMENT — LOCATION DETAILED DESCRIPTION DERM: LOCATION DETAILED: RIGHT SUPERIOR POSTAURICULAR SKIN

## 2020-06-24 ASSESSMENT — LOCATION ZONE DERM: LOCATION ZONE: SCALP

## 2020-06-24 ASSESSMENT — LOCATION SIMPLE DESCRIPTION DERM: LOCATION SIMPLE: SCALP

## 2020-06-24 NOTE — PROCEDURE: EXCISION
Medical Necessity Information: It is in your best interest to select a reason for this procedure from the list below. All of these items fulfill various CMS LCD requirements except lesion extends to a margin.
Include Z78.9 (Other Specified Conditions Influencing Health Status) As An Associated Diagnosis?: No
Medical Necessity Clause: This procedure was medically necessary because the lesion that was treated was:
Lab: [id number]
Lab Facility: 
Surgeon (Optional): Heike
Biopsy Photograph Reviewed: Yes
Size Of Lesion In Cm: 1.2
X Size Of Lesion In Cm (Optional): 0
Size Of Margin In Cm: 0.2
Excision Method: Elliptical
Repair Type: Complex
Intermediate / Complex Repair - Final Wound Length In Cm: 3.5
Width Of Defect Perpendicular To Closure In Cm (Required): 1.4
Distance Of Undermining In Cm (Required): 1.5
Undermining Type: Entire Wound
Debridement Text: The wound edges were debrided prior to proceeding with the closure to facilitate wound healing.
Helical Rim Text: The closure involved the helical rim.
Vermilion Border Text: The closure involved the vermilion border.
Nostril Rim Text: The closure involved the nostril rim.
Retention Suture Text: Retention sutures were placed to support the closure and prevent dehiscence.
Epidermal Closure Graft Donor Site (Optional): simple interrupted
Graft Donor Site Bandage (Optional-Leave Blank If You Don't Want In Note): Steri-strips and a pressure bandage were applied to the donor site.
Detail Level: Detailed
Excision Depth: adipose tissue
Scalpel Size: 15 blade
Anesthesia Type: 1% lidocaine with 1:100,000 epinephrine and 408mcg clindamycin/ml and a 1:10 solution of 8.4% sodium bicarbonate
Additional Anesthesia Volume In Cc: 6
Hemostasis: Electrocautery
Estimated Blood Loss (Cc): minimal
Repair Anesthesia Method: local infiltration
Anesthesia Volume In Cc: 5.7
Deep Sutures: 4-0 Vicryl
Dermal Closure: buried vertical mattress
Epidermal Sutures: 5-0 Caprosyn
Epidermal Closure: running locked
Wound Care: Bacitracin
Dressing: steri-strips
Complex Repair Preamble Text (Leave Blank If You Do Not Want): Extensive wide undermining was performed.
Intermediate Repair Preamble Text (Leave Blank If You Do Not Want): Undermining was performed with blunt dissection.
Fusiform Excision Additional Text (Leave Blank If You Do Not Want): The margin was drawn around the clinically apparent lesion.  A fusiform shape was then drawn on the skin incorporating the lesion and margins.  Incisions were then made along these lines to the appropriate tissue plane and the lesion was extirpated.
Eliptical Excision Additional Text (Leave Blank If You Do Not Want): The margin was drawn around the clinically apparent lesion.  An elliptical shape was then drawn on the skin incorporating the lesion and margins.  Incisions were then made along these lines to the appropriate tissue plane and the lesion was extirpated.
Saucerization Excision Additional Text (Leave Blank If You Do Not Want): The margin was drawn around the clinically apparent lesion.  Incisions were then made along these lines, in a tangential fashion, to the appropriate tissue plane and the lesion was extirpated.
Slit Excision Additional Text (Leave Blank If You Do Not Want): A linear line was drawn on the skin overlying the lesion. An incision was made slowly until the lesion was visualized.  Once visualized, the lesion was removed with blunt dissection.
Excisional Biopsy Additional Text (Leave Blank If You Do Not Want): The margin was drawn around the clinically apparent lesion. An elliptical shape was then drawn on the skin incorporating the lesion and margins.  Incisions were then made along these lines to the appropriate tissue plane and the lesion was extirpated.
Perilesional Excision Additional Text (Leave Blank If You Do Not Want): The margin was drawn around the clinically apparent lesion. Incisions were then made along these lines to the appropriate tissue plane and the lesion was extirpated.
Repair Performed By Another Provider Text (Leave Blank If You Do Not Want): After the tissue was excised the defect was repaired by another provider.
No Repair - Repaired With Adjacent Surgical Defect Text (Leave Blank If You Do Not Want): After the excision the defect was repaired concurrently with another surgical defect which was in close approximation.
Advancement Flap (Single) Text: The defect edges were debeveled with a #15 scalpel blade.  Given the location of the defect and the proximity to free margins a single advancement flap was deemed most appropriate.  Using a sterile surgical marker, an appropriate advancement flap was drawn incorporating the defect and placing the expected incisions within the relaxed skin tension lines where possible.    The area thus outlined was incised deep to adipose tissue with a #15 scalpel blade.  The skin margins were undermined to an appropriate distance in all directions utilizing iris scissors.
Advancement Flap (Double) Text: The defect edges were debeveled with a #15 scalpel blade.  Given the location of the defect and the proximity to free margins a double advancement flap was deemed most appropriate.  Using a sterile surgical marker, the appropriate advancement flaps were drawn incorporating the defect and placing the expected incisions within the relaxed skin tension lines where possible.    The area thus outlined was incised deep to adipose tissue with a #15 scalpel blade.  The skin margins were undermined to an appropriate distance in all directions utilizing iris scissors.
Burow's Advancement Flap Text: The defect edges were debeveled with a #15 scalpel blade.  Given the location of the defect and the proximity to free margins a Burow's advancement flap was deemed most appropriate.  Using a sterile surgical marker, the appropriate advancement flap was drawn incorporating the defect and placing the expected incisions within the relaxed skin tension lines where possible.    The area thus outlined was incised deep to adipose tissue with a #15 scalpel blade.  The skin margins were undermined to an appropriate distance in all directions utilizing iris scissors.
Chonodrocutaneous Helical Advancement Flap Text: The defect edges were debeveled with a #15 scalpel blade.  Given the location of the defect and the proximity to free margins a chondrocutaneous helical advancement flap was deemed most appropriate.  Using a sterile surgical marker, the appropriate advancement flap was drawn incorporating the defect and placing the expected incisions within the relaxed skin tension lines where possible.    The area thus outlined was incised deep to adipose tissue with a #15 scalpel blade.  The skin margins were undermined to an appropriate distance in all directions utilizing iris scissors.
Crescentic Advancement Flap Text: The defect edges were debeveled with a #15 scalpel blade.  Given the location of the defect and the proximity to free margins a crescentic advancement flap was deemed most appropriate.  Using a sterile surgical marker, the appropriate advancement flap was drawn incorporating the defect and placing the expected incisions within the relaxed skin tension lines where possible.    The area thus outlined was incised deep to adipose tissue with a #15 scalpel blade.  The skin margins were undermined to an appropriate distance in all directions utilizing iris scissors.
A-T Advancement Flap Text: The defect edges were debeveled with a #15 scalpel blade.  Given the location of the defect, shape of the defect and the proximity to free margins an A-T advancement flap was deemed most appropriate.  Using a sterile surgical marker, an appropriate advancement flap was drawn incorporating the defect and placing the expected incisions within the relaxed skin tension lines where possible.    The area thus outlined was incised deep to adipose tissue with a #15 scalpel blade.  The skin margins were undermined to an appropriate distance in all directions utilizing iris scissors.
O-T Advancement Flap Text: The defect edges were debeveled with a #15 scalpel blade.  Given the location of the defect, shape of the defect and the proximity to free margins an O-T advancement flap was deemed most appropriate.  Using a sterile surgical marker, an appropriate advancement flap was drawn incorporating the defect and placing the expected incisions within the relaxed skin tension lines where possible.    The area thus outlined was incised deep to adipose tissue with a #15 scalpel blade.  The skin margins were undermined to an appropriate distance in all directions utilizing iris scissors.
O-L Flap Text: The defect edges were debeveled with a #15 scalpel blade.  Given the location of the defect, shape of the defect and the proximity to free margins an O-L flap was deemed most appropriate.  Using a sterile surgical marker, an appropriate advancement flap was drawn incorporating the defect and placing the expected incisions within the relaxed skin tension lines where possible.    The area thus outlined was incised deep to adipose tissue with a #15 scalpel blade.  The skin margins were undermined to an appropriate distance in all directions utilizing iris scissors.
O-Z Flap Text: The defect edges were debeveled with a #15 scalpel blade.  Given the location of the defect, shape of the defect and the proximity to free margins an O-Z flap was deemed most appropriate.  Using a sterile surgical marker, an appropriate transposition flap was drawn incorporating the defect and placing the expected incisions within the relaxed skin tension lines where possible. The area thus outlined was incised deep to adipose tissue with a #15 scalpel blade.  The skin margins were undermined to an appropriate distance in all directions utilizing iris scissors.
Double O-Z Flap Text: The defect edges were debeveled with a #15 scalpel blade.  Given the location of the defect, shape of the defect and the proximity to free margins a Double O-Z flap was deemed most appropriate.  Using a sterile surgical marker, an appropriate transposition flap was drawn incorporating the defect and placing the expected incisions within the relaxed skin tension lines where possible. The area thus outlined was incised deep to adipose tissue with a #15 scalpel blade.  The skin margins were undermined to an appropriate distance in all directions utilizing iris scissors.
V-Y Flap Text: The defect edges were debeveled with a #15 scalpel blade.  Given the location of the defect, shape of the defect and the proximity to free margins a V-Y flap was deemed most appropriate.  Using a sterile surgical marker, an appropriate advancement flap was drawn incorporating the defect and placing the expected incisions within the relaxed skin tension lines where possible.    The area thus outlined was incised deep to adipose tissue with a #15 scalpel blade.  The skin margins were undermined to an appropriate distance in all directions utilizing iris scissors.
Mercedes Flap Text: The defect edges were debeveled with a #15 scalpel blade.  Given the location of the defect, shape of the defect and the proximity to free margins a Mercedes flap was deemed most appropriate.  Using a sterile surgical marker, an appropriate advancement flap was drawn incorporating the defect and placing the expected incisions within the relaxed skin tension lines where possible. The area thus outlined was incised deep to adipose tissue with a #15 scalpel blade.  The skin margins were undermined to an appropriate distance in all directions utilizing iris scissors.
Modified Advancement Flap Text: The defect edges were debeveled with a #15 scalpel blade.  Given the location of the defect, shape of the defect and the proximity to free margins a modified advancement flap was deemed most appropriate.  Using a sterile surgical marker, an appropriate advancement flap was drawn incorporating the defect and placing the expected incisions within the relaxed skin tension lines where possible.    The area thus outlined was incised deep to adipose tissue with a #15 scalpel blade.  The skin margins were undermined to an appropriate distance in all directions utilizing iris scissors.
Mucosal Advancement Flap Text: Given the location of the defect, shape of the defect and the proximity to free margins a mucosal advancement flap was deemed most appropriate. Incisions were made with a 15 blade scalpel in the appropriate fashion along the cutaneous vermillion border and the mucosal lip. The remaining actinically damaged mucosal tissue was excised.  The mucosal advancement flap was then elevated to the gingival sulcus with care taken to preserve the neurovascular structures and advanced into the primary defect. Care was taken to ensure that precise realignment of the vermilion border was achieved.
Hatchet Flap Text: The defect edges were debeveled with a #15 scalpel blade.  Given the location of the defect, shape of the defect and the proximity to free margins a hatchet flap was deemed most appropriate.  Using a sterile surgical marker, an appropriate hatchet flap was drawn incorporating the defect and placing the expected incisions within the relaxed skin tension lines where possible.    The area thus outlined was incised deep to adipose tissue with a #15 scalpel blade.  The skin margins were undermined to an appropriate distance in all directions utilizing iris scissors.
Rotation Flap Text: The defect edges were debeveled with a #15 scalpel blade.  Given the location of the defect, shape of the defect and the proximity to free margins a rotation flap was deemed most appropriate.  Using a sterile surgical marker, an appropriate rotation flap was drawn incorporating the defect and placing the expected incisions within the relaxed skin tension lines where possible.    The area thus outlined was incised deep to adipose tissue with a #15 scalpel blade.  The skin margins were undermined to an appropriate distance in all directions utilizing iris scissors.
Spiral Flap Text: The defect edges were debeveled with a #15 scalpel blade.  Given the location of the defect, shape of the defect and the proximity to free margins a spiral flap was deemed most appropriate.  Using a sterile surgical marker, an appropriate rotation flap was drawn incorporating the defect and placing the expected incisions within the relaxed skin tension lines where possible. The area thus outlined was incised deep to adipose tissue with a #15 scalpel blade.  The skin margins were undermined to an appropriate distance in all directions utilizing iris scissors.
Star Wedge Flap Text: The defect edges were debeveled with a #15 scalpel blade.  Given the location of the defect, shape of the defect and the proximity to free margins a star wedge flap was deemed most appropriate.  Using a sterile surgical marker, an appropriate rotation flap was drawn incorporating the defect and placing the expected incisions within the relaxed skin tension lines where possible. The area thus outlined was incised deep to adipose tissue with a #15 scalpel blade.  The skin margins were undermined to an appropriate distance in all directions utilizing iris scissors.
Transposition Flap Text: The defect edges were debeveled with a #15 scalpel blade.  Given the location of the defect and the proximity to free margins a transposition flap was deemed most appropriate.  Using a sterile surgical marker, an appropriate transposition flap was drawn incorporating the defect.    The area thus outlined was incised deep to adipose tissue with a #15 scalpel blade.  The skin margins were undermined to an appropriate distance in all directions utilizing iris scissors.
Muscle Hinge Flap Text: The defect edges were debeveled with a #15 scalpel blade.  Given the size, depth and location of the defect and the proximity to free margins a muscle hinge flap was deemed most appropriate.  Using a sterile surgical marker, an appropriate hinge flap was drawn incorporating the defect. The area thus outlined was incised with a #15 scalpel blade.  The skin margins were undermined to an appropriate distance in all directions utilizing iris scissors.
Melolabial Transposition Flap Text: The defect edges were debeveled with a #15 scalpel blade.  Given the location of the defect and the proximity to free margins a melolabial flap was deemed most appropriate.  Using a sterile surgical marker, an appropriate melolabial transposition flap was drawn incorporating the defect.    The area thus outlined was incised deep to adipose tissue with a #15 scalpel blade.  The skin margins were undermined to an appropriate distance in all directions utilizing iris scissors.
Rhombic Flap Text: The defect edges were debeveled with a #15 scalpel blade.  Given the location of the defect and the proximity to free margins a rhombic flap was deemed most appropriate.  Using a sterile surgical marker, an appropriate rhombic flap was drawn incorporating the defect.    The area thus outlined was incised deep to adipose tissue with a #15 scalpel blade.  The skin margins were undermined to an appropriate distance in all directions utilizing iris scissors.
Rhomboid Transposition Flap Text: The defect edges were debeveled with a #15 scalpel blade.  Given the location of the defect and the proximity to free margins a rhomboid transposition flap was deemed most appropriate.  Using a sterile surgical marker, an appropriate rhomboid flap was drawn incorporating the defect.    The area thus outlined was incised deep to adipose tissue with a #15 scalpel blade.  The skin margins were undermined to an appropriate distance in all directions utilizing iris scissors.
Bi-Rhombic Flap Text: The defect edges were debeveled with a #15 scalpel blade.  Given the location of the defect and the proximity to free margins a bi-rhombic flap was deemed most appropriate.  Using a sterile surgical marker, an appropriate rhombic flap was drawn incorporating the defect. The area thus outlined was incised deep to adipose tissue with a #15 scalpel blade.  The skin margins were undermined to an appropriate distance in all directions utilizing iris scissors.
Helical Rim Advancement Flap Text: The defect edges were debeveled with a #15 blade scalpel.  Given the location of the defect and the proximity to free margins (helical rim) a double helical rim advancement flap was deemed most appropriate.  Using a sterile surgical marker, the appropriate advancement flaps were drawn incorporating the defect and placing the expected incisions between the helical rim and antihelix where possible.  The area thus outlined was incised through and through with a #15 scalpel blade.  With a skin hook and iris scissors, the flaps were gently and sharply undermined and freed up.
Bilateral Helical Rim Advancement Flap Text: The defect edges were debeveled with a #15 blade scalpel.  Given the location of the defect and the proximity to free margins (helical rim) a bilateral helical rim advancement flap was deemed most appropriate.  Using a sterile surgical marker, the appropriate advancement flaps were drawn incorporating the defect and placing the expected incisions between the helical rim and antihelix where possible.  The area thus outlined was incised through and through with a #15 scalpel blade.  With a skin hook and iris scissors, the flaps were gently and sharply undermined and freed up.
Ear Star Wedge Flap Text: The defect edges were debeveled with a #15 blade scalpel.  Given the location of the defect and the proximity to free margins (helical rim) an ear star wedge flap was deemed most appropriate.  Using a sterile surgical marker, the appropriate flap was drawn incorporating the defect and placing the expected incisions between the helical rim and antihelix where possible.  The area thus outlined was incised through and through with a #15 scalpel blade.
Banner Transposition Flap Text: The defect edges were debeveled with a #15 scalpel blade.  Given the location of the defect and the proximity to free margins a Banner transposition flap was deemed most appropriate.  Using a sterile surgical marker, an appropriate flap drawn around the defect. The area thus outlined was incised deep to adipose tissue with a #15 scalpel blade.  The skin margins were undermined to an appropriate distance in all directions utilizing iris scissors.
Bilobed Flap Text: The defect edges were debeveled with a #15 scalpel blade.  Given the location of the defect and the proximity to free margins a bilobe flap was deemed most appropriate.  Using a sterile surgical marker, an appropriate bilobe flap drawn around the defect.    The area thus outlined was incised deep to adipose tissue with a #15 scalpel blade.  The skin margins were undermined to an appropriate distance in all directions utilizing iris scissors.
Bilobed Transposition Flap Text: The defect edges were debeveled with a #15 scalpel blade.  Given the location of the defect and the proximity to free margins a bilobed transposition flap was deemed most appropriate.  Using a sterile surgical marker, an appropriate bilobe flap drawn around the defect.    The area thus outlined was incised deep to adipose tissue with a #15 scalpel blade.  The skin margins were undermined to an appropriate distance in all directions utilizing iris scissors.
Trilobed Flap Text: The defect edges were debeveled with a #15 scalpel blade.  Given the location of the defect and the proximity to free margins a trilobed flap was deemed most appropriate.  Using a sterile surgical marker, an appropriate trilobed flap drawn around the defect.    The area thus outlined was incised deep to adipose tissue with a #15 scalpel blade.  The skin margins were undermined to an appropriate distance in all directions utilizing iris scissors.
Dorsal Nasal Flap Text: The defect edges were debeveled with a #15 scalpel blade.  Given the location of the defect and the proximity to free margins a dorsal nasal flap was deemed most appropriate.  Using a sterile surgical marker, an appropriate dorsal nasal flap was drawn around the defect.    The area thus outlined was incised deep to adipose tissue with a #15 scalpel blade.  The skin margins were undermined to an appropriate distance in all directions utilizing iris scissors.
Island Pedicle Flap Text: The defect edges were debeveled with a #15 scalpel blade.  Given the location of the defect, shape of the defect and the proximity to free margins an island pedicle advancement flap was deemed most appropriate.  Using a sterile surgical marker, an appropriate advancement flap was drawn incorporating the defect, outlining the appropriate donor tissue and placing the expected incisions within the relaxed skin tension lines where possible.    The area thus outlined was incised deep to adipose tissue with a #15 scalpel blade.  The skin margins were undermined to an appropriate distance in all directions around the primary defect and laterally outward around the island pedicle utilizing iris scissors.  There was minimal undermining beneath the pedicle flap.
Island Pedicle Flap With Canthal Suspension Text: The defect edges were debeveled with a #15 scalpel blade.  Given the location of the defect, shape of the defect and the proximity to free margins an island pedicle advancement flap was deemed most appropriate.  Using a sterile surgical marker, an appropriate advancement flap was drawn incorporating the defect, outlining the appropriate donor tissue and placing the expected incisions within the relaxed skin tension lines where possible. The area thus outlined was incised deep to adipose tissue with a #15 scalpel blade.  The skin margins were undermined to an appropriate distance in all directions around the primary defect and laterally outward around the island pedicle utilizing iris scissors.  There was minimal undermining beneath the pedicle flap. A suspension suture was placed in the canthal tendon to prevent tension and prevent ectropion.
Alar Island Pedicle Flap Text: The defect edges were debeveled with a #15 scalpel blade.  Given the location of the defect, shape of the defect and the proximity to the alar rim an island pedicle advancement flap was deemed most appropriate.  Using a sterile surgical marker, an appropriate advancement flap was drawn incorporating the defect, outlining the appropriate donor tissue and placing the expected incisions within the nasal ala running parallel to the alar rim. The area thus outlined was incised with a #15 scalpel blade.  The skin margins were undermined minimally to an appropriate distance in all directions around the primary defect and laterally outward around the island pedicle utilizing iris scissors.  There was minimal undermining beneath the pedicle flap.
Double Island Pedicle Flap Text: The defect edges were debeveled with a #15 scalpel blade.  Given the location of the defect, shape of the defect and the proximity to free margins a double island pedicle advancement flap was deemed most appropriate.  Using a sterile surgical marker, an appropriate advancement flap was drawn incorporating the defect, outlining the appropriate donor tissue and placing the expected incisions within the relaxed skin tension lines where possible.    The area thus outlined was incised deep to adipose tissue with a #15 scalpel blade.  The skin margins were undermined to an appropriate distance in all directions around the primary defect and laterally outward around the island pedicle utilizing iris scissors.  There was minimal undermining beneath the pedicle flap.
Island Pedicle Flap-Requiring Vessel Identification Text: The defect edges were debeveled with a #15 scalpel blade.  Given the location of the defect, shape of the defect and the proximity to free margins an island pedicle advancement flap was deemed most appropriate.  Using a sterile surgical marker, an appropriate advancement flap was drawn, based on the axial vessel mentioned above, incorporating the defect, outlining the appropriate donor tissue and placing the expected incisions within the relaxed skin tension lines where possible.    The area thus outlined was incised deep to adipose tissue with a #15 scalpel blade.  The skin margins were undermined to an appropriate distance in all directions around the primary defect and laterally outward around the island pedicle utilizing iris scissors.  There was minimal undermining beneath the pedicle flap.
Keystone Flap Text: The defect edges were debeveled with a #15 scalpel blade.  Given the location of the defect, shape of the defect a keystone flap was deemed most appropriate.  Using a sterile surgical marker, an appropriate keystone flap was drawn incorporating the defect, outlining the appropriate donor tissue and placing the expected incisions within the relaxed skin tension lines where possible. The area thus outlined was incised deep to adipose tissue with a #15 scalpel blade.  The skin margins were undermined to an appropriate distance in all directions around the primary defect and laterally outward around the flap utilizing iris scissors.
O-T Plasty Text: The defect edges were debeveled with a #15 scalpel blade.  Given the location of the defect, shape of the defect and the proximity to free margins an O-T plasty was deemed most appropriate.  Using a sterile surgical marker, an appropriate O-T plasty was drawn incorporating the defect and placing the expected incisions within the relaxed skin tension lines where possible.    The area thus outlined was incised deep to adipose tissue with a #15 scalpel blade.  The skin margins were undermined to an appropriate distance in all directions utilizing iris scissors.
O-Z Plasty Text: The defect edges were debeveled with a #15 scalpel blade.  Given the location of the defect, shape of the defect and the proximity to free margins an O-Z plasty (double transposition flap) was deemed most appropriate.  Using a sterile surgical marker, the appropriate transposition flaps were drawn incorporating the defect and placing the expected incisions within the relaxed skin tension lines where possible.    The area thus outlined was incised deep to adipose tissue with a #15 scalpel blade.  The skin margins were undermined to an appropriate distance in all directions utilizing iris scissors.  Hemostasis was achieved with electrocautery.  The flaps were then transposed into place, one clockwise and the other counterclockwise, and anchored with interrupted buried subcutaneous sutures.
Double O-Z Plasty Text: The defect edges were debeveled with a #15 scalpel blade.  Given the location of the defect, shape of the defect and the proximity to free margins a Double O-Z plasty (double transposition flap) was deemed most appropriate.  Using a sterile surgical marker, the appropriate transposition flaps were drawn incorporating the defect and placing the expected incisions within the relaxed skin tension lines where possible. The area thus outlined was incised deep to adipose tissue with a #15 scalpel blade.  The skin margins were undermined to an appropriate distance in all directions utilizing iris scissors.  Hemostasis was achieved with electrocautery.  The flaps were then transposed into place, one clockwise and the other counterclockwise, and anchored with interrupted buried subcutaneous sutures.
S Plasty Text: Given the location and shape of the defect, and the orientation of relaxed skin tension lines, an S-plasty was deemed most appropriate for repair.  Using a sterile surgical marker, the appropriate outline of the S-plasty was drawn, incorporating the defect and placing the expected incisions within the relaxed skin tension lines where possible.  The area thus outlined was incised deep to adipose tissue with a #15 scalpel blade.  The skin margins were undermined to an appropriate distance in all directions utilizing iris scissors. The skin flaps were advanced over the defect.  The opposing margins were then approximated with interrupted buried subcutaneous sutures.
V-Y Plasty Text: The defect edges were debeveled with a #15 scalpel blade.  Given the location of the defect, shape of the defect and the proximity to free margins an V-Y advancement flap was deemed most appropriate.  Using a sterile surgical marker, an appropriate advancement flap was drawn incorporating the defect and placing the expected incisions within the relaxed skin tension lines where possible.    The area thus outlined was incised deep to adipose tissue with a #15 scalpel blade.  The skin margins were undermined to an appropriate distance in all directions utilizing iris scissors.
H Plasty Text: Given the location of the defect, shape of the defect and the proximity to free margins a H-plasty was deemed most appropriate for repair.  Using a sterile surgical marker, the appropriate advancement arms of the H-plasty were drawn incorporating the defect and placing the expected incisions within the relaxed skin tension lines where possible. The area thus outlined was incised deep to adipose tissue with a #15 scalpel blade. The skin margins were undermined to an appropriate distance in all directions utilizing iris scissors.  The opposing advancement arms were then advanced into place in opposite direction and anchored with interrupted buried subcutaneous sutures.
W Plasty Text: The lesion was extirpated to the level of the fat with a #15 scalpel blade.  Given the location of the defect, shape of the defect and the proximity to free margins a W-plasty was deemed most appropriate for repair.  Using a sterile surgical marker, the appropriate transposition arms of the W-plasty were drawn incorporating the defect and placing the expected incisions within the relaxed skin tension lines where possible.    The area thus outlined was incised deep to adipose tissue with a #15 scalpel blade.  The skin margins were undermined to an appropriate distance in all directions utilizing iris scissors.  The opposing transposition arms were then transposed into place in opposite direction and anchored with interrupted buried subcutaneous sutures.
Z Plasty Text: The lesion was extirpated to the level of the fat with a #15 scalpel blade.  Given the location of the defect, shape of the defect and the proximity to free margins a Z-plasty was deemed most appropriate for repair.  Using a sterile surgical marker, the appropriate transposition arms of the Z-plasty were drawn incorporating the defect and placing the expected incisions within the relaxed skin tension lines where possible.    The area thus outlined was incised deep to adipose tissue with a #15 scalpel blade.  The skin margins were undermined to an appropriate distance in all directions utilizing iris scissors.  The opposing transposition arms were then transposed into place in opposite direction and anchored with interrupted buried subcutaneous sutures.
Cheek Interpolation Flap Text: A decision was made to reconstruct the defect utilizing an interpolation axial flap and a staged reconstruction.  A telfa template was made of the defect.  This telfa template was then used to outline the Cheek Interpolation flap.  The donor area for the pedicle flap was then injected with anesthesia.  The flap was excised through the skin and subcutaneous tissue down to the layer of the underlying musculature.  The interpolation flap was carefully excised within this deep plane to maintain its blood supply.  The edges of the donor site were undermined.   The donor site was closed in a primary fashion.  The pedicle was then rotated into position and sutured.  Once the tube was sutured into place, adequate blood supply was confirmed with blanching and refill.  The pedicle was then wrapped with xeroform gauze and dressed appropriately with a telfa and gauze bandage to ensure continued blood supply and protect the attached pedicle.
Cheek-To-Nose Interpolation Flap Text: A decision was made to reconstruct the defect utilizing an interpolation axial flap and a staged reconstruction.  A telfa template was made of the defect.  This telfa template was then used to outline the Cheek-To-Nose Interpolation flap.  The donor area for the pedicle flap was then injected with anesthesia.  The flap was excised through the skin and subcutaneous tissue down to the layer of the underlying musculature.  The interpolation flap was carefully excised within this deep plane to maintain its blood supply.  The edges of the donor site were undermined.   The donor site was closed in a primary fashion.  The pedicle was then rotated into position and sutured.  Once the tube was sutured into place, adequate blood supply was confirmed with blanching and refill.  The pedicle was then wrapped with xeroform gauze and dressed appropriately with a telfa and gauze bandage to ensure continued blood supply and protect the attached pedicle.
Interpolation Flap Text: A decision was made to reconstruct the defect utilizing an interpolation axial flap and a staged reconstruction.  A telfa template was made of the defect.  This telfa template was then used to outline the interpolation flap.  The donor area for the pedicle flap was then injected with anesthesia.  The flap was excised through the skin and subcutaneous tissue down to the layer of the underlying musculature.  The interpolation flap was carefully excised within this deep plane to maintain its blood supply.  The edges of the donor site were undermined.   The donor site was closed in a primary fashion.  The pedicle was then rotated into position and sutured.  Once the tube was sutured into place, adequate blood supply was confirmed with blanching and refill.  The pedicle was then wrapped with xeroform gauze and dressed appropriately with a telfa and gauze bandage to ensure continued blood supply and protect the attached pedicle.
Melolabial Interpolation Flap Text: A decision was made to reconstruct the defect utilizing an interpolation axial flap and a staged reconstruction.  A telfa template was made of the defect.  This telfa template was then used to outline the melolabial interpolation flap.  The donor area for the pedicle flap was then injected with anesthesia.  The flap was excised through the skin and subcutaneous tissue down to the layer of the underlying musculature.  The pedicle flap was carefully excised within this deep plane to maintain its blood supply.  The edges of the donor site were undermined.   The donor site was closed in a primary fashion.  The pedicle was then rotated into position and sutured.  Once the tube was sutured into place, adequate blood supply was confirmed with blanching and refill.  The pedicle was then wrapped with xeroform gauze and dressed appropriately with a telfa and gauze bandage to ensure continued blood supply and protect the attached pedicle.
Mastoid Interpolation Flap Text: A decision was made to reconstruct the defect utilizing an interpolation axial flap and a staged reconstruction.  A telfa template was made of the defect.  This telfa template was then used to outline the mastoid interpolation flap.  The donor area for the pedicle flap was then injected with anesthesia.  The flap was excised through the skin and subcutaneous tissue down to the layer of the underlying musculature.  The pedicle flap was carefully excised within this deep plane to maintain its blood supply.  The edges of the donor site were undermined.   The donor site was closed in a primary fashion.  The pedicle was then rotated into position and sutured.  Once the tube was sutured into place, adequate blood supply was confirmed with blanching and refill.  The pedicle was then wrapped with xeroform gauze and dressed appropriately with a telfa and gauze bandage to ensure continued blood supply and protect the attached pedicle.
Posterior Auricular Interpolation Flap Text: A decision was made to reconstruct the defect utilizing an interpolation axial flap and a staged reconstruction.  A telfa template was made of the defect.  This telfa template was then used to outline the posterior auricular interpolation flap.  The donor area for the pedicle flap was then injected with anesthesia.  The flap was excised through the skin and subcutaneous tissue down to the layer of the underlying musculature.  The pedicle flap was carefully excised within this deep plane to maintain its blood supply.  The edges of the donor site were undermined.   The donor site was closed in a primary fashion.  The pedicle was then rotated into position and sutured.  Once the tube was sutured into place, adequate blood supply was confirmed with blanching and refill.  The pedicle was then wrapped with xeroform gauze and dressed appropriately with a telfa and gauze bandage to ensure continued blood supply and protect the attached pedicle.
Paramedian Forehead Flap Text: A decision was made to reconstruct the defect utilizing an interpolation axial flap and a staged reconstruction.  A telfa template was made of the defect.  This telfa template was then used to outline the paramedian forehead pedicle flap.  The donor area for the pedicle flap was then injected with anesthesia.  The flap was excised through the skin and subcutaneous tissue down to the layer of the underlying musculature.  The pedicle flap was carefully excised within this deep plane to maintain its blood supply.  The edges of the donor site were undermined.   The donor site was closed in a primary fashion.  The pedicle was then rotated into position and sutured.  Once the tube was sutured into place, adequate blood supply was confirmed with blanching and refill.  The pedicle was then wrapped with xeroform gauze and dressed appropriately with a telfa and gauze bandage to ensure continued blood supply and protect the attached pedicle.
Lip Wedge Excision Repair Text: Given the location of the defect and the proximity to free margins a full thickness wedge repair was deemed most appropriate.  Using a sterile surgical marker, the appropriate repair was drawn incorporating the defect and placing the expected incisions perpendicular to the vermilion border.  The vermilion border was also meticulously outlined to ensure appropriate reapproximation during the repair.  The area thus outlined was incised through and through with a #15 scalpel blade.  The muscularis and dermis were reaproximated with deep sutures following hemostasis. Care was taken to realign the vermilion border before proceeding with the superficial closure.  Once the vermilion was realigned the superfical and mucosal closure was finished.
Ftsg Text: The defect edges were debeveled with a #15 scalpel blade.  Given the location of the defect, shape of the defect and the proximity to free margins a full thickness skin graft was deemed most appropriate.  Using a sterile surgical marker, the primary defect shape was transferred to the donor site. The area thus outlined was incised deep to adipose tissue with a #15 scalpel blade.  The harvested graft was then trimmed of adipose tissue until only dermis and epidermis was left.  The skin margins of the secondary defect were undermined to an appropriate distance in all directions utilizing iris scissors.  The secondary defect was closed with interrupted buried subcutaneous sutures.  The skin edges were then re-apposed with running  sutures.  The skin graft was then placed in the primary defect and oriented appropriately.
Split-Thickness Skin Graft Text: The defect edges were debeveled with a #15 scalpel blade.  Given the location of the defect, shape of the defect and the proximity to free margins a split thickness skin graft was deemed most appropriate.  Using a sterile surgical marker, the primary defect shape was transferred to the donor site. The split thickness graft was then harvested.  The skin graft was then placed in the primary defect and oriented appropriately.
Burow's Graft Text: The defect edges were debeveled with a #15 scalpel blade.  Given the location of the defect, shape of the defect, the proximity to free margins and the presence of a standing cone deformity a Burow's skin graft was deemed most appropriate. The standing cone was removed and this tissue was then trimmed to the shape of the primary defect. The adipose tissue was also removed until only dermis and epidermis were left.  The skin margins of the secondary defect were undermined to an appropriate distance in all directions utilizing iris scissors.  The secondary defect was closed with interrupted buried subcutaneous sutures.  The skin edges were then re-apposed with running  sutures.  The skin graft was then placed in the primary defect and oriented appropriately.
Cartilage Graft Text: The defect edges were debeveled with a #15 scalpel blade.  Given the location of the defect, shape of the defect, the fact the defect involved a full thickness cartilage defect a cartilage graft was deemed most appropriate.  An appropriate donor site was identified, cleansed, and anesthetized. The cartilage graft was then harvested and transferred to the recipient site, oriented appropriately and then sutured into place.  The secondary defect was then repaired using a primary closure.
Composite Graft Text: The defect edges were debeveled with a #15 scalpel blade.  Given the location of the defect, shape of the defect, the proximity to free margins and the fact the defect was full thickness a composite graft was deemed most appropriate.  The defect was outline and then transferred to the donor site.  A full thickness graft was then excised from the donor site. The graft was then placed in the primary defect, oriented appropriately and then sutured into place.  The secondary defect was then repaired using a primary closure.
Epidermal Autograft Text: The defect edges were debeveled with a #15 scalpel blade.  Given the location of the defect, shape of the defect and the proximity to free margins an epidermal autograft was deemed most appropriate.  Using a sterile surgical marker, the primary defect shape was transferred to the donor site. The epidermal graft was then harvested.  The skin graft was then placed in the primary defect and oriented appropriately.
Dermal Autograft Text: The defect edges were debeveled with a #15 scalpel blade.  Given the location of the defect, shape of the defect and the proximity to free margins a dermal autograft was deemed most appropriate.  Using a sterile surgical marker, the primary defect shape was transferred to the donor site. The area thus outlined was incised deep to adipose tissue with a #15 scalpel blade.  The harvested graft was then trimmed of adipose and epidermal tissue until only dermis was left.  The skin graft was then placed in the primary defect and oriented appropriately.
Skin Substitute Text: The defect edges were debeveled with a #15 scalpel blade.  Given the location of the defect, shape of the defect and the proximity to free margins a skin substitute graft was deemed most appropriate.  The graft material was trimmed to fit the size of the defect. The graft was then placed in the primary defect and oriented appropriately.
Tissue Cultured Epidermal Autograft Text: The defect edges were debeveled with a #15 scalpel blade.  Given the location of the defect, shape of the defect and the proximity to free margins a tissue cultured epidermal autograft was deemed most appropriate.  The graft was then trimmed to fit the size of the defect.  The graft was then placed in the primary defect and oriented appropriately.
Xenograft Text: The defect edges were debeveled with a #15 scalpel blade.  Given the location of the defect, shape of the defect and the proximity to free margins a xenograft was deemed most appropriate.  The graft was then trimmed to fit the size of the defect.  The graft was then placed in the primary defect and oriented appropriately.
Purse String (Intermediate) Text: Given the location of the defect and the characteristics of the surrounding skin a purse string intermediate closure was deemed most appropriate.  Undermining was performed circumferentially around the surgical defect.  A purse string suture was then placed and tightened.
Purse String (Simple) Text: Given the location of the defect and the characteristics of the surrounding skin a purse string simple closure was deemed most appropriate.  Undermining was performed circumferentially around the surgical defect.  A purse string suture was then placed and tightened.
Complex Repair And Single Advancement Flap Text: The defect edges were debeveled with a #15 scalpel blade.  The primary defect was closed partially with a complex linear closure.  Given the location of the remaining defect, shape of the defect and the proximity to free margins a single advancement flap was deemed most appropriate for complete closure of the defect.  Using a sterile surgical marker, an appropriate advancement flap was drawn incorporating the defect and placing the expected incisions within the relaxed skin tension lines where possible.    The area thus outlined was incised deep to adipose tissue with a #15 scalpel blade.  The skin margins were undermined to an appropriate distance in all directions utilizing iris scissors.
Complex Repair And Double Advancement Flap Text: The defect edges were debeveled with a #15 scalpel blade.  The primary defect was closed partially with a complex linear closure.  Given the location of the remaining defect, shape of the defect and the proximity to free margins a double advancement flap was deemed most appropriate for complete closure of the defect.  Using a sterile surgical marker, an appropriate advancement flap was drawn incorporating the defect and placing the expected incisions within the relaxed skin tension lines where possible.    The area thus outlined was incised deep to adipose tissue with a #15 scalpel blade.  The skin margins were undermined to an appropriate distance in all directions utilizing iris scissors.
Complex Repair And Modified Advancement Flap Text: The defect edges were debeveled with a #15 scalpel blade.  The primary defect was closed partially with a complex linear closure.  Given the location of the remaining defect, shape of the defect and the proximity to free margins a modified advancement flap was deemed most appropriate for complete closure of the defect.  Using a sterile surgical marker, an appropriate advancement flap was drawn incorporating the defect and placing the expected incisions within the relaxed skin tension lines where possible.    The area thus outlined was incised deep to adipose tissue with a #15 scalpel blade.  The skin margins were undermined to an appropriate distance in all directions utilizing iris scissors.
Complex Repair And A-T Advancement Flap Text: The defect edges were debeveled with a #15 scalpel blade.  The primary defect was closed partially with a complex linear closure.  Given the location of the remaining defect, shape of the defect and the proximity to free margins an A-T advancement flap was deemed most appropriate for complete closure of the defect.  Using a sterile surgical marker, an appropriate advancement flap was drawn incorporating the defect and placing the expected incisions within the relaxed skin tension lines where possible.    The area thus outlined was incised deep to adipose tissue with a #15 scalpel blade.  The skin margins were undermined to an appropriate distance in all directions utilizing iris scissors.
Complex Repair And O-T Advancement Flap Text: The defect edges were debeveled with a #15 scalpel blade.  The primary defect was closed partially with a complex linear closure.  Given the location of the remaining defect, shape of the defect and the proximity to free margins an O-T advancement flap was deemed most appropriate for complete closure of the defect.  Using a sterile surgical marker, an appropriate advancement flap was drawn incorporating the defect and placing the expected incisions within the relaxed skin tension lines where possible.    The area thus outlined was incised deep to adipose tissue with a #15 scalpel blade.  The skin margins were undermined to an appropriate distance in all directions utilizing iris scissors.
Complex Repair And O-L Flap Text: The defect edges were debeveled with a #15 scalpel blade.  The primary defect was closed partially with a complex linear closure.  Given the location of the remaining defect, shape of the defect and the proximity to free margins an O-L flap was deemed most appropriate for complete closure of the defect.  Using a sterile surgical marker, an appropriate flap was drawn incorporating the defect and placing the expected incisions within the relaxed skin tension lines where possible.    The area thus outlined was incised deep to adipose tissue with a #15 scalpel blade.  The skin margins were undermined to an appropriate distance in all directions utilizing iris scissors.
Complex Repair And Bilobe Flap Text: The defect edges were debeveled with a #15 scalpel blade.  The primary defect was closed partially with a complex linear closure.  Given the location of the remaining defect, shape of the defect and the proximity to free margins a bilobe flap was deemed most appropriate for complete closure of the defect.  Using a sterile surgical marker, an appropriate advancement flap was drawn incorporating the defect and placing the expected incisions within the relaxed skin tension lines where possible.    The area thus outlined was incised deep to adipose tissue with a #15 scalpel blade.  The skin margins were undermined to an appropriate distance in all directions utilizing iris scissors.
Complex Repair And Melolabial Flap Text: The defect edges were debeveled with a #15 scalpel blade.  The primary defect was closed partially with a complex linear closure.  Given the location of the remaining defect, shape of the defect and the proximity to free margins a melolabial flap was deemed most appropriate for complete closure of the defect.  Using a sterile surgical marker, an appropriate advancement flap was drawn incorporating the defect and placing the expected incisions within the relaxed skin tension lines where possible.    The area thus outlined was incised deep to adipose tissue with a #15 scalpel blade.  The skin margins were undermined to an appropriate distance in all directions utilizing iris scissors.
Complex Repair And Rotation Flap Text: The defect edges were debeveled with a #15 scalpel blade.  The primary defect was closed partially with a complex linear closure.  Given the location of the remaining defect, shape of the defect and the proximity to free margins a rotation flap was deemed most appropriate for complete closure of the defect.  Using a sterile surgical marker, an appropriate advancement flap was drawn incorporating the defect and placing the expected incisions within the relaxed skin tension lines where possible.    The area thus outlined was incised deep to adipose tissue with a #15 scalpel blade.  The skin margins were undermined to an appropriate distance in all directions utilizing iris scissors.
Complex Repair And Rhombic Flap Text: The defect edges were debeveled with a #15 scalpel blade.  The primary defect was closed partially with a complex linear closure.  Given the location of the remaining defect, shape of the defect and the proximity to free margins a rhombic flap was deemed most appropriate for complete closure of the defect.  Using a sterile surgical marker, an appropriate advancement flap was drawn incorporating the defect and placing the expected incisions within the relaxed skin tension lines where possible.    The area thus outlined was incised deep to adipose tissue with a #15 scalpel blade.  The skin margins were undermined to an appropriate distance in all directions utilizing iris scissors.
Complex Repair And Transposition Flap Text: The defect edges were debeveled with a #15 scalpel blade.  The primary defect was closed partially with a complex linear closure.  Given the location of the remaining defect, shape of the defect and the proximity to free margins a transposition flap was deemed most appropriate for complete closure of the defect.  Using a sterile surgical marker, an appropriate advancement flap was drawn incorporating the defect and placing the expected incisions within the relaxed skin tension lines where possible.    The area thus outlined was incised deep to adipose tissue with a #15 scalpel blade.  The skin margins were undermined to an appropriate distance in all directions utilizing iris scissors.
Complex Repair And V-Y Plasty Text: The defect edges were debeveled with a #15 scalpel blade.  The primary defect was closed partially with a complex linear closure.  Given the location of the remaining defect, shape of the defect and the proximity to free margins a V-Y plasty was deemed most appropriate for complete closure of the defect.  Using a sterile surgical marker, an appropriate advancement flap was drawn incorporating the defect and placing the expected incisions within the relaxed skin tension lines where possible.    The area thus outlined was incised deep to adipose tissue with a #15 scalpel blade.  The skin margins were undermined to an appropriate distance in all directions utilizing iris scissors.
Complex Repair And M Plasty Text: The defect edges were debeveled with a #15 scalpel blade.  The primary defect was closed partially with a complex linear closure.  Given the location of the remaining defect, shape of the defect and the proximity to free margins an M plasty was deemed most appropriate for complete closure of the defect.  Using a sterile surgical marker, an appropriate advancement flap was drawn incorporating the defect and placing the expected incisions within the relaxed skin tension lines where possible.    The area thus outlined was incised deep to adipose tissue with a #15 scalpel blade.  The skin margins were undermined to an appropriate distance in all directions utilizing iris scissors.
Complex Repair And Double M Plasty Text: The defect edges were debeveled with a #15 scalpel blade.  The primary defect was closed partially with a complex linear closure.  Given the location of the remaining defect, shape of the defect and the proximity to free margins a double M plasty was deemed most appropriate for complete closure of the defect.  Using a sterile surgical marker, an appropriate advancement flap was drawn incorporating the defect and placing the expected incisions within the relaxed skin tension lines where possible.    The area thus outlined was incised deep to adipose tissue with a #15 scalpel blade.  The skin margins were undermined to an appropriate distance in all directions utilizing iris scissors.
Complex Repair And W Plasty Text: The defect edges were debeveled with a #15 scalpel blade.  The primary defect was closed partially with a complex linear closure.  Given the location of the remaining defect, shape of the defect and the proximity to free margins a W plasty was deemed most appropriate for complete closure of the defect.  Using a sterile surgical marker, an appropriate advancement flap was drawn incorporating the defect and placing the expected incisions within the relaxed skin tension lines where possible.    The area thus outlined was incised deep to adipose tissue with a #15 scalpel blade.  The skin margins were undermined to an appropriate distance in all directions utilizing iris scissors.
Complex Repair And Z Plasty Text: The defect edges were debeveled with a #15 scalpel blade.  The primary defect was closed partially with a complex linear closure.  Given the location of the remaining defect, shape of the defect and the proximity to free margins a Z plasty was deemed most appropriate for complete closure of the defect.  Using a sterile surgical marker, an appropriate advancement flap was drawn incorporating the defect and placing the expected incisions within the relaxed skin tension lines where possible.    The area thus outlined was incised deep to adipose tissue with a #15 scalpel blade.  The skin margins were undermined to an appropriate distance in all directions utilizing iris scissors.
Complex Repair And Dorsal Nasal Flap Text: The defect edges were debeveled with a #15 scalpel blade.  The primary defect was closed partially with a complex linear closure.  Given the location of the remaining defect, shape of the defect and the proximity to free margins a dorsal nasal flap was deemed most appropriate for complete closure of the defect.  Using a sterile surgical marker, an appropriate flap was drawn incorporating the defect and placing the expected incisions within the relaxed skin tension lines where possible.    The area thus outlined was incised deep to adipose tissue with a #15 scalpel blade.  The skin margins were undermined to an appropriate distance in all directions utilizing iris scissors.
Complex Repair And Ftsg Text: The defect edges were debeveled with a #15 scalpel blade.  The primary defect was closed partially with a complex linear closure.  Given the location of the defect, shape of the defect and the proximity to free margins a full thickness skin graft was deemed most appropriate to repair the remaining defect.  The graft was trimmed to fit the size of the remaining defect.  The graft was then placed in the primary defect, oriented appropriately, and sutured into place.
Complex Repair And Burow's Graft Text: The defect edges were debeveled with a #15 scalpel blade.  The primary defect was closed partially with a complex linear closure.  Given the location of the defect, shape of the defect, the proximity to free margins and the presence of a standing cone deformity a Burow's graft was deemed most appropriate to repair the remaining defect.  The graft was trimmed to fit the size of the remaining defect.  The graft was then placed in the primary defect, oriented appropriately, and sutured into place.
Complex Repair And Split-Thickness Skin Graft Text: The defect edges were debeveled with a #15 scalpel blade.  The primary defect was closed partially with a complex linear closure.  Given the location of the defect, shape of the defect and the proximity to free margins a split thickness skin graft was deemed most appropriate to repair the remaining defect.  The graft was trimmed to fit the size of the remaining defect.  The graft was then placed in the primary defect, oriented appropriately, and sutured into place.
Complex Repair And Epidermal Autograft Text: The defect edges were debeveled with a #15 scalpel blade.  The primary defect was closed partially with a complex linear closure.  Given the location of the defect, shape of the defect and the proximity to free margins an epidermal autograft was deemed most appropriate to repair the remaining defect.  The graft was trimmed to fit the size of the remaining defect.  The graft was then placed in the primary defect, oriented appropriately, and sutured into place.
Complex Repair And Dermal Autograft Text: The defect edges were debeveled with a #15 scalpel blade.  The primary defect was closed partially with a complex linear closure.  Given the location of the defect, shape of the defect and the proximity to free margins an dermal autograft was deemed most appropriate to repair the remaining defect.  The graft was trimmed to fit the size of the remaining defect.  The graft was then placed in the primary defect, oriented appropriately, and sutured into place.
Complex Repair And Tissue Cultured Epidermal Autograft Text: The defect edges were debeveled with a #15 scalpel blade.  The primary defect was closed partially with a complex linear closure.  Given the location of the defect, shape of the defect and the proximity to free margins an tissue cultured epidermal autograft was deemed most appropriate to repair the remaining defect.  The graft was trimmed to fit the size of the remaining defect.  The graft was then placed in the primary defect, oriented appropriately, and sutured into place.
Complex Repair And Xenograft Text: The defect edges were debeveled with a #15 scalpel blade.  The primary defect was closed partially with a complex linear closure.  Given the location of the defect, shape of the defect and the proximity to free margins a xenograft was deemed most appropriate to repair the remaining defect.  The graft was trimmed to fit the size of the remaining defect.  The graft was then placed in the primary defect, oriented appropriately, and sutured into place.
Complex Repair And Skin Substitute Graft Text: The defect edges were debeveled with a #15 scalpel blade.  The primary defect was closed partially with a complex linear closure.  Given the location of the remaining defect, shape of the defect and the proximity to free margins a skin substitute graft was deemed most appropriate to repair the remaining defect.  The graft was trimmed to fit the size of the remaining defect.  The graft was then placed in the primary defect, oriented appropriately, and sutured into place.
Path Notes (To The Dermatopathologist): Please check margins.
Consent was obtained from the patient. The risks and benefits to therapy were discussed in detail. Specifically, the risks of infection, scarring, bleeding, prolonged wound healing, incomplete removal, allergy to anesthesia, nerve injury and recurrence were addressed. Prior to the procedure, the treatment site was clearly identified and confirmed by the patient. All components of Universal Protocol/PAUSE Rule completed.
Post-Care Instructions: I reviewed with the patient in detail post-care instructions:\\n1. Apply bacitracin over the steri-strips.  \\n2. Cut non-stick pad (Telfa) to cover the steri-strips\\n3. Apply tape (hypafix) over the non-stick pad\\n4. Change once per day for 5 days\\n5. Shower with bandage on, change bandage after shower\\n\\nPatient is not to engage in any heavy lifting, exercise, hot tub, or swimming for the next 14 days. Should the patient develop any fevers, chills, bleeding, severe pain patient will contact the office immediately.
Home Suture Removal Text: Patient was provided a home suture removal kit and will remove their sutures at home.  If they have any questions or difficulties they will call the office.
Where Do You Want The Question To Include Opioid Counseling Located?: Case Summary Tab
Billing Type: Third-Party Bill
Information: Selecting Yes will display possible errors in your note based on the variables you have selected. This validation is only offered as a suggestion for you. PLEASE NOTE THAT THE VALIDATION TEXT WILL BE REMOVED WHEN YOU FINALIZE YOUR NOTE. IF YOU WANT TO FAX A PRELIMINARY NOTE YOU WILL NEED TO TOGGLE THIS TO 'NO' IF YOU DO NOT WANT IT IN YOUR FAXED NOTE.

## 2020-07-17 ENCOUNTER — APPOINTMENT (RX ONLY)
Dept: URBAN - METROPOLITAN AREA CLINIC 20 | Facility: CLINIC | Age: 70
Setting detail: DERMATOLOGY
End: 2020-07-17

## 2020-07-17 DIAGNOSIS — L57.0 ACTINIC KERATOSIS: ICD-10-CM

## 2020-07-17 DIAGNOSIS — L57.8 OTHER SKIN CHANGES DUE TO CHRONIC EXPOSURE TO NONIONIZING RADIATION: ICD-10-CM

## 2020-07-17 DIAGNOSIS — D22 MELANOCYTIC NEVI: ICD-10-CM

## 2020-07-17 DIAGNOSIS — L82.1 OTHER SEBORRHEIC KERATOSIS: ICD-10-CM

## 2020-07-17 DIAGNOSIS — Z85.828 PERSONAL HISTORY OF OTHER MALIGNANT NEOPLASM OF SKIN: ICD-10-CM

## 2020-07-17 DIAGNOSIS — D18.0 HEMANGIOMA: ICD-10-CM

## 2020-07-17 DIAGNOSIS — L81.4 OTHER MELANIN HYPERPIGMENTATION: ICD-10-CM

## 2020-07-17 PROBLEM — D48.5 NEOPLASM OF UNCERTAIN BEHAVIOR OF SKIN: Status: ACTIVE | Noted: 2020-07-17

## 2020-07-17 PROBLEM — D22.5 MELANOCYTIC NEVI OF TRUNK: Status: ACTIVE | Noted: 2020-07-17

## 2020-07-17 PROBLEM — D18.01 HEMANGIOMA OF SKIN AND SUBCUTANEOUS TISSUE: Status: ACTIVE | Noted: 2020-07-17

## 2020-07-17 PROCEDURE — ? BIOPSY BY SHAVE METHOD

## 2020-07-17 PROCEDURE — 69100 BIOPSY OF EXTERNAL EAR: CPT | Mod: 59

## 2020-07-17 PROCEDURE — 99214 OFFICE O/P EST MOD 30 MIN: CPT | Mod: 25

## 2020-07-17 PROCEDURE — 17000 DESTRUCT PREMALG LESION: CPT

## 2020-07-17 PROCEDURE — ? COUNSELING

## 2020-07-17 PROCEDURE — ? LIQUID NITROGEN

## 2020-07-17 ASSESSMENT — LOCATION SIMPLE DESCRIPTION DERM
LOCATION SIMPLE: LEFT POSTERIOR THIGH
LOCATION SIMPLE: RIGHT UPPER BACK
LOCATION SIMPLE: LEFT EAR
LOCATION SIMPLE: RIGHT POSTERIOR THIGH
LOCATION SIMPLE: LEFT UPPER ARM
LOCATION SIMPLE: RIGHT ANTERIOR NECK
LOCATION SIMPLE: LEFT HAND
LOCATION SIMPLE: RIGHT UPPER ARM
LOCATION SIMPLE: RIGHT CHEEK
LOCATION SIMPLE: LEFT FOREARM
LOCATION SIMPLE: LEFT UPPER BACK
LOCATION SIMPLE: RIGHT HAND

## 2020-07-17 ASSESSMENT — LOCATION DETAILED DESCRIPTION DERM
LOCATION DETAILED: LEFT DISTAL DORSAL FOREARM
LOCATION DETAILED: LEFT DISTAL POSTERIOR THIGH
LOCATION DETAILED: RIGHT INFERIOR MEDIAL UPPER BACK
LOCATION DETAILED: RIGHT INFERIOR ANTERIOR NECK
LOCATION DETAILED: LEFT SUPERIOR UPPER BACK
LOCATION DETAILED: RIGHT DISTAL LATERAL POSTERIOR THIGH
LOCATION DETAILED: RIGHT DISTAL POSTERIOR UPPER ARM
LOCATION DETAILED: RIGHT INFERIOR CENTRAL MALAR CHEEK
LOCATION DETAILED: LEFT DISTAL POSTERIOR UPPER ARM
LOCATION DETAILED: RIGHT SUPERIOR UPPER BACK
LOCATION DETAILED: LEFT RADIAL DORSAL HAND
LOCATION DETAILED: RIGHT RADIAL DORSAL HAND
LOCATION DETAILED: LEFT ANTIHELIX

## 2020-07-17 ASSESSMENT — LOCATION ZONE DERM
LOCATION ZONE: NECK
LOCATION ZONE: HAND
LOCATION ZONE: LEG
LOCATION ZONE: EAR
LOCATION ZONE: FACE
LOCATION ZONE: ARM
LOCATION ZONE: TRUNK

## 2020-08-01 DIAGNOSIS — G43.009 MIGRAINE WITHOUT AURA AND WITHOUT STATUS MIGRAINOSUS, NOT INTRACTABLE: ICD-10-CM

## 2020-08-03 RX ORDER — BUTALBITAL, ACETAMINOPHEN AND CAFFEINE 50; 325; 40 MG/1; MG/1; MG/1
TABLET ORAL
Qty: 30 TAB | Refills: 0 | Status: SHIPPED | OUTPATIENT
Start: 2020-08-03 | End: 2020-09-02

## 2020-09-08 ENCOUNTER — OFFICE VISIT (OUTPATIENT)
Dept: MEDICAL GROUP | Facility: PHYSICIAN GROUP | Age: 70
End: 2020-09-08
Payer: MEDICARE

## 2020-09-08 VITALS
TEMPERATURE: 97.6 F | SYSTOLIC BLOOD PRESSURE: 110 MMHG | HEART RATE: 78 BPM | DIASTOLIC BLOOD PRESSURE: 60 MMHG | HEIGHT: 69 IN | BODY MASS INDEX: 21.88 KG/M2 | WEIGHT: 147.71 LBS | OXYGEN SATURATION: 94 %

## 2020-09-08 DIAGNOSIS — M54.50 ACUTE LEFT-SIDED LOW BACK PAIN WITHOUT SCIATICA: ICD-10-CM

## 2020-09-08 LAB
APPEARANCE UR: NORMAL
BILIRUB UR STRIP-MCNC: NORMAL MG/DL
COLOR UR AUTO: NORMAL
GLUCOSE UR STRIP.AUTO-MCNC: NORMAL MG/DL
KETONES UR STRIP.AUTO-MCNC: NORMAL MG/DL
LEUKOCYTE ESTERASE UR QL STRIP.AUTO: NORMAL
NITRITE UR QL STRIP.AUTO: NORMAL
PH UR STRIP.AUTO: 7 [PH] (ref 5–8)
PROT UR QL STRIP: NORMAL MG/DL
RBC UR QL AUTO: NORMAL
SP GR UR STRIP.AUTO: 1.01
UROBILINOGEN UR STRIP-MCNC: 0.2 MG/DL

## 2020-09-08 PROCEDURE — 99214 OFFICE O/P EST MOD 30 MIN: CPT | Performed by: FAMILY MEDICINE

## 2020-09-08 PROCEDURE — 81002 URINALYSIS NONAUTO W/O SCOPE: CPT | Performed by: FAMILY MEDICINE

## 2020-09-08 RX ORDER — METHOCARBAMOL 750 MG/1
750 TABLET, FILM COATED ORAL 4 TIMES DAILY
Qty: 120 TAB | Refills: 0 | Status: SHIPPED | OUTPATIENT
Start: 2020-09-08

## 2020-09-08 RX ORDER — ZOLPIDEM TARTRATE 10 MG/1
10 TABLET ORAL NIGHTLY PRN
COMMUNITY
End: 2020-10-22

## 2020-09-08 RX ORDER — BUTALBITAL, ACETAMINOPHEN AND CAFFEINE 50; 325; 40 MG/1; MG/1; MG/1
1 TABLET ORAL EVERY 4 HOURS PRN
COMMUNITY
End: 2020-10-22

## 2020-09-08 ASSESSMENT — FIBROSIS 4 INDEX: FIB4 SCORE: 2.21

## 2020-09-09 NOTE — PROGRESS NOTES
Subjective:      Vickey Turner is a 70 y.o. male who presents with Pain (lower back)            HPI     This is a 70-year-old white male who is a patient of KAILA Gonsalez.  PCP is not currently available.    He is here because of left-sided lumbar pain which started 3 weeks ago after he was lifting a table saw which is about 150 pounds.  This is not a work-related injury.  He said he did not lift the table properly and he did not bend his knees.  Pain is sharp when it is severe and becomes a dull ache when it is mild.  It is 8 out of 10 when it is at its worst.  Denies any radiation down the leg.  Denies any numbness or tingling down the leg.  Denies any disturbance in urination or bowel movement.  Denies any fever or chills.  He however is still concerned that this could be from his kidneys.  He has not seen any blood in the urine.  He has been to his chiropractor Dr. Merino and saw him 2 weeks ago and had manipulation done with some help.  He said he takes Ambien for sleep and he has been taking it more frequently to help with the pain so he can sleep.    He has a history of right-sided low back pain with sciatica.  MRI in August 2019 showed multilevel DDD, facet arthropathy.  He was referred to PMR by his PCP at that time and was seen and evaluated by Dr. Dorado.  He had a trial of injection which was medial branch blocks and he said he did not have any relief.  He was subsequently referred to physical therapy but he said he did not go and was doing only home exercises at home and his back pain resolved.  He said he was given tizanidine as a muscle relaxant by Dr. Dorado but it did not help.    He has GERD and so he said he cannot take NSAIDs for the pain.    Past medical history, past surgical history, family history reviewed-no changes    Social history reviewed-no changes    Allergies reviewed-no changes    Medications reviewed-no changes    ROS     As per HPI, the rest are  "negative.       Objective:     /60 (BP Location: Left arm, Patient Position: Sitting, BP Cuff Size: Adult)   Pulse 78   Temp 36.4 °C (97.6 °F) (Temporal)   Ht 1.753 m (5' 9\")   Wt 67 kg (147 lb 11.3 oz)   SpO2 94%   BMI 21.81 kg/m²      Physical Exam     Examined alert, awake, oriented, not in distress    Neck-supple, no lymphadenopathy, no thyromegaly  Lungs-clear to auscultation, no rales, no wheezes  Heart-regular rate and rhythm, no murmur  Extremities-no edema, clubbing, cyanosis  Back exam-he has lumbar scoliosis, he has more skinfold on the left side at the area of the spine probably from the scoliosis, he has tenderness in the left lumbar paraspinal muscles, no tenderness on palpation of the spinous processes of the lumbosacral spine       Urine dipstick -clear/within normal limits     Assessment/Plan:        1. Acute left-sided low back pain without sciatica  Urine dipstick without any abnormal findings.  This is most likely muscle strain from lifting the heavy table saw.  Advised conservative measures with warm compresses 15 minutes 3 times a day.  He said he cannot tolerate NSAIDs because of GERD so I instructed him to take Tylenol as needed.  Tizanidine did not work for him in the past so I prescribed methocarbamol 750 mg 1 tablet 4 times a day as needed.  Advised to be careful when taking the muscle relaxant with his Ambien and Fioricet.  Advised to rest the back and not to do any exercises at this time until he is recovered.  Return if there is no improvement or if there is worsening of the problem.  - POCT Urinalysis  - methocarbamol (ROBAXIN) 750 MG Tab; Take 1 Tab by mouth 4 times a day.  Dispense: 120 Tab; Refill: 0      Please note that this dictation was created using voice recognition software. I have worked with consultants from the vendor as well as technical experts from [employer] to optimize the interface. I have made every reasonable attempt to correct obvious errors, but I " expect that there are errors of grammar and possibly content I did not discover before finalizing the note.

## 2020-09-11 ENCOUNTER — HOSPITAL ENCOUNTER (OUTPATIENT)
Dept: LAB | Facility: MEDICAL CENTER | Age: 70
End: 2020-09-11
Attending: PHYSICIAN ASSISTANT
Payer: MEDICARE

## 2020-09-11 LAB
25(OH)D3 SERPL-MCNC: 93 NG/ML (ref 30–100)
ALBUMIN SERPL BCP-MCNC: 4.2 G/DL (ref 3.2–4.9)
ALBUMIN/GLOB SERPL: 1.5 G/DL
ALP SERPL-CCNC: 78 U/L (ref 30–99)
ALT SERPL-CCNC: 23 U/L (ref 2–50)
ANION GAP SERPL CALC-SCNC: 14 MMOL/L (ref 7–16)
AST SERPL-CCNC: 27 U/L (ref 12–45)
BASOPHILS # BLD AUTO: 0.9 % (ref 0–1.8)
BASOPHILS # BLD: 0.05 K/UL (ref 0–0.12)
BILIRUB SERPL-MCNC: 0.9 MG/DL (ref 0.1–1.5)
BUN SERPL-MCNC: 17 MG/DL (ref 8–22)
CALCIUM SERPL-MCNC: 9.4 MG/DL (ref 8.5–10.5)
CHLORIDE SERPL-SCNC: 101 MMOL/L (ref 96–112)
CHOLEST SERPL-MCNC: 215 MG/DL (ref 100–199)
CO2 SERPL-SCNC: 26 MMOL/L (ref 20–33)
CREAT SERPL-MCNC: 1.01 MG/DL (ref 0.5–1.4)
EOSINOPHIL # BLD AUTO: 0.27 K/UL (ref 0–0.51)
EOSINOPHIL NFR BLD: 4.8 % (ref 0–6.9)
ERYTHROCYTE [DISTWIDTH] IN BLOOD BY AUTOMATED COUNT: 47.2 FL (ref 35.9–50)
ESTRADIOL SERPL-MCNC: 28.2 PG/ML
GLOBULIN SER CALC-MCNC: 2.8 G/DL (ref 1.9–3.5)
GLUCOSE SERPL-MCNC: 91 MG/DL (ref 65–99)
HCT VFR BLD AUTO: 45.1 % (ref 42–52)
HDLC SERPL-MCNC: 83 MG/DL
HGB BLD-MCNC: 14.9 G/DL (ref 14–18)
IMM GRANULOCYTES # BLD AUTO: 0.02 K/UL (ref 0–0.11)
IMM GRANULOCYTES NFR BLD AUTO: 0.4 % (ref 0–0.9)
LDLC SERPL CALC-MCNC: 120 MG/DL
LYMPHOCYTES # BLD AUTO: 1.48 K/UL (ref 1–4.8)
LYMPHOCYTES NFR BLD: 26.2 % (ref 22–41)
MCH RBC QN AUTO: 33.4 PG (ref 27–33)
MCHC RBC AUTO-ENTMCNC: 33 G/DL (ref 33.7–35.3)
MCV RBC AUTO: 101.1 FL (ref 81.4–97.8)
MONOCYTES # BLD AUTO: 0.73 K/UL (ref 0–0.85)
MONOCYTES NFR BLD AUTO: 12.9 % (ref 0–13.4)
NEUTROPHILS # BLD AUTO: 3.1 K/UL (ref 1.82–7.42)
NEUTROPHILS NFR BLD: 54.8 % (ref 44–72)
NRBC # BLD AUTO: 0 K/UL
NRBC BLD-RTO: 0 /100 WBC
PLATELET # BLD AUTO: 222 K/UL (ref 164–446)
PMV BLD AUTO: 9.9 FL (ref 9–12.9)
POTASSIUM SERPL-SCNC: 4.6 MMOL/L (ref 3.6–5.5)
PROT SERPL-MCNC: 7 G/DL (ref 6–8.2)
RBC # BLD AUTO: 4.46 M/UL (ref 4.7–6.1)
SODIUM SERPL-SCNC: 141 MMOL/L (ref 135–145)
T3FREE SERPL-MCNC: 2.51 PG/ML (ref 2–4.4)
T4 SERPL-MCNC: 5.5 UG/DL (ref 4–12)
THYROPEROXIDASE AB SERPL-ACNC: <9 IU/ML (ref 0–9)
TRIGL SERPL-MCNC: 62 MG/DL (ref 0–149)
TSH SERPL DL<=0.005 MIU/L-ACNC: 2.39 UIU/ML (ref 0.38–5.33)
WBC # BLD AUTO: 5.7 K/UL (ref 4.8–10.8)

## 2020-09-11 PROCEDURE — 80061 LIPID PANEL: CPT

## 2020-09-11 PROCEDURE — 84403 ASSAY OF TOTAL TESTOSTERONE: CPT

## 2020-09-11 PROCEDURE — 36415 COLL VENOUS BLD VENIPUNCTURE: CPT

## 2020-09-11 PROCEDURE — 82306 VITAMIN D 25 HYDROXY: CPT

## 2020-09-11 PROCEDURE — 84436 ASSAY OF TOTAL THYROXINE: CPT

## 2020-09-11 PROCEDURE — 84481 FREE ASSAY (FT-3): CPT

## 2020-09-11 PROCEDURE — 84443 ASSAY THYROID STIM HORMONE: CPT

## 2020-09-11 PROCEDURE — 80053 COMPREHEN METABOLIC PANEL: CPT

## 2020-09-11 PROCEDURE — 84270 ASSAY OF SEX HORMONE GLOBUL: CPT

## 2020-09-11 PROCEDURE — 84402 ASSAY OF FREE TESTOSTERONE: CPT

## 2020-09-11 PROCEDURE — 82670 ASSAY OF TOTAL ESTRADIOL: CPT

## 2020-09-11 PROCEDURE — 85025 COMPLETE CBC W/AUTO DIFF WBC: CPT

## 2020-09-11 PROCEDURE — 86376 MICROSOMAL ANTIBODY EACH: CPT

## 2020-09-15 LAB
SHBG SERPL-SCNC: 120 NMOL/L (ref 11–80)
TESTOST FREE MFR SERPL: 0.9 % (ref 1.6–2.9)
TESTOST FREE SERPL-MCNC: 115 PG/ML (ref 47–244)
TESTOST SERPL-MCNC: 1306 NG/DL (ref 300–720)

## 2020-09-23 ENCOUNTER — APPOINTMENT (RX ONLY)
Dept: URBAN - METROPOLITAN AREA CLINIC 20 | Facility: CLINIC | Age: 70
Setting detail: DERMATOLOGY
End: 2020-09-23

## 2020-09-23 DIAGNOSIS — L57.0 ACTINIC KERATOSIS: ICD-10-CM

## 2020-09-23 PROCEDURE — ? PDT: RED

## 2020-09-23 PROCEDURE — ? SUNSCREEN RECOMMENDATIONS

## 2020-09-23 PROCEDURE — ? PHOTODYNAMIC THERAPY COUNSELING

## 2020-09-23 PROCEDURE — 96567 PDT DSTR PRMLG LES SKN: CPT

## 2020-09-23 ASSESSMENT — LOCATION SIMPLE DESCRIPTION DERM: LOCATION SIMPLE: RIGHT FOREARM

## 2020-09-23 ASSESSMENT — LOCATION DETAILED DESCRIPTION DERM: LOCATION DETAILED: RIGHT PROXIMAL DORSAL FOREARM

## 2020-09-23 ASSESSMENT — LOCATION ZONE DERM: LOCATION ZONE: ARM

## 2020-09-23 NOTE — PROCEDURE: PDT: RED
Anesthesia Type: 0.5% lidocaine with 1:200,000 epinephrine
Consent: Written consent obtained.  The risks were reviewed with the patient including but not limited to: pigmentary changes, pain, blistering, scabbing, redness, and the remote possibility of scarring.
Total Number Of Aks Treated (Optional To Report): 0
Debridement Text (Will Only Render In Visit Note If You Select Debridement Option Under Who Performed The Pdt Field): Prior to application of the photodynamic medication the hyperkeratotic lesions were curetted to make them more amenable to therapy.
Who Performed The Pdt?: Performed by Nurse, MA or Aesthetician ([id number])
Illumination Time: 7 min 7 sec
Incubation Time (Set To 00:00:00 If Not Wanted): 03:00
Post-Care Instructions: I reviewed with the patient in detail post-care instructions. Patient is to avoid sunlight for the next 2 days, and wear sun protection. Patients may expect sunburn like redness, discomfort and scabbing.
Detail Level: Zone
Expiration Date (Optional): 08.2021
Photosensitizer: Ameluz
Number Of Kerasticks/Tubes Of Metvixia/Tubes Of Ameluz Used: 1
Lot # (Optional): [id number]
Ndc# (Optional): [id number]
Application Method: under occlusion

## 2020-10-22 DIAGNOSIS — M54.50 CHRONIC BILATERAL LOW BACK PAIN WITHOUT SCIATICA: ICD-10-CM

## 2020-10-22 DIAGNOSIS — J06.9 ACUTE URI: ICD-10-CM

## 2020-10-22 DIAGNOSIS — F51.01 PRIMARY INSOMNIA: ICD-10-CM

## 2020-10-22 DIAGNOSIS — G43.009 MIGRAINE WITHOUT AURA AND WITHOUT STATUS MIGRAINOSUS, NOT INTRACTABLE: ICD-10-CM

## 2020-10-22 DIAGNOSIS — G89.29 CHRONIC BILATERAL LOW BACK PAIN WITHOUT SCIATICA: ICD-10-CM

## 2020-10-22 RX ORDER — FLUTICASONE PROPIONATE 50 MCG
SPRAY, SUSPENSION (ML) NASAL
Qty: 16 G | Refills: 0 | Status: SHIPPED | OUTPATIENT
Start: 2020-10-22

## 2020-10-22 RX ORDER — BUTALBITAL, ACETAMINOPHEN AND CAFFEINE 50; 325; 40 MG/1; MG/1; MG/1
TABLET ORAL
Qty: 30 TAB | Refills: 0 | Status: SHIPPED | OUTPATIENT
Start: 2020-10-22 | End: 2020-12-17 | Stop reason: SDUPTHER

## 2020-10-22 RX ORDER — ZOLPIDEM TARTRATE 10 MG/1
TABLET ORAL
Qty: 30 TAB | Refills: 0 | Status: SHIPPED | OUTPATIENT
Start: 2020-10-22 | End: 2020-12-17 | Stop reason: SDUPTHER

## 2020-12-14 DIAGNOSIS — G43.009 MIGRAINE WITHOUT AURA AND WITHOUT STATUS MIGRAINOSUS, NOT INTRACTABLE: ICD-10-CM

## 2020-12-14 RX ORDER — BUTALBITAL, ACETAMINOPHEN AND CAFFEINE 50; 325; 40 MG/1; MG/1; MG/1
TABLET ORAL
Qty: 30 TAB | Refills: 0 | OUTPATIENT
Start: 2020-12-14

## 2020-12-14 RX ORDER — ZOLPIDEM TARTRATE 10 MG/1
10 TABLET ORAL NIGHTLY PRN
Qty: 30 TAB | Refills: 0 | OUTPATIENT
Start: 2020-12-14

## 2020-12-14 NOTE — TELEPHONE ENCOUNTER
Received request via: Patient    Was the patient seen in the last year in this department? Yes    Does the patient have an active prescription (recently filled or refills available) for medication(s) requested? No       Pt called asking for a refill on these meds

## 2020-12-14 NOTE — TELEPHONE ENCOUNTER
Phone Number Called: [phone] (home)     Call outcome: Spoke to patient regarding message below.    Message: Pt scheduled for VV on Thursday at 8:20AM

## 2020-12-17 ENCOUNTER — OFFICE VISIT (OUTPATIENT)
Dept: MEDICAL GROUP | Facility: PHYSICIAN GROUP | Age: 70
End: 2020-12-17
Payer: MEDICARE

## 2020-12-17 ENCOUNTER — APPOINTMENT (OUTPATIENT)
Dept: MEDICAL GROUP | Facility: PHYSICIAN GROUP | Age: 70
End: 2020-12-17
Payer: MEDICARE

## 2020-12-17 VITALS
BODY MASS INDEX: 22.16 KG/M2 | HEIGHT: 69 IN | RESPIRATION RATE: 16 BRPM | SYSTOLIC BLOOD PRESSURE: 100 MMHG | WEIGHT: 149.6 LBS | HEART RATE: 80 BPM | DIASTOLIC BLOOD PRESSURE: 62 MMHG | OXYGEN SATURATION: 97 % | TEMPERATURE: 98.4 F

## 2020-12-17 DIAGNOSIS — G43.009 MIGRAINE WITHOUT AURA AND WITHOUT STATUS MIGRAINOSUS, NOT INTRACTABLE: ICD-10-CM

## 2020-12-17 DIAGNOSIS — F51.01 PRIMARY INSOMNIA: ICD-10-CM

## 2020-12-17 DIAGNOSIS — Z82.49 FAMILY HISTORY OF ABDOMINAL AORTIC ANEURYSM (AAA): ICD-10-CM

## 2020-12-17 DIAGNOSIS — Z84.89 FAMILY HISTORY OF EARLY SUDDEN DEATH: ICD-10-CM

## 2020-12-17 PROCEDURE — 99214 OFFICE O/P EST MOD 30 MIN: CPT | Performed by: NURSE PRACTITIONER

## 2020-12-17 RX ORDER — ZOLPIDEM TARTRATE 10 MG/1
10 TABLET ORAL NIGHTLY PRN
Qty: 30 TAB | Refills: 2 | Status: SHIPPED | OUTPATIENT
Start: 2020-12-17 | End: 2021-01-16

## 2020-12-17 RX ORDER — BUTALBITAL, ACETAMINOPHEN AND CAFFEINE 50; 325; 40 MG/1; MG/1; MG/1
1 TABLET ORAL EVERY 6 HOURS PRN
Qty: 30 TAB | Refills: 0 | Status: SHIPPED | OUTPATIENT
Start: 2020-12-17 | End: 2021-01-16

## 2020-12-17 ASSESSMENT — FIBROSIS 4 INDEX: FIB4 SCORE: 1.72

## 2020-12-18 NOTE — ASSESSMENT & PLAN NOTE
Chronic in nature.  Patient states he has severe insomnia.  Patient is counseled regarding risks of medication states that Ambien works well and he does not have side effects.  Helps him stay asleep and fall asleep.  Patient states there have been times when he has not slept for up to 72 hours related to insomnia.  Patient does go to bed around the same time every evening and continues to stop use of bluelight hours prior to bed.  Patient wishes to continue medication at this time.

## 2020-12-18 NOTE — ASSESSMENT & PLAN NOTE
Chronic in nature.  Patient states that recently he has had some increase in headaches.  Patient states that he has noticed with increased stress from Covid that he has have at least one headache per week but states that sometimes it has been even more frequent.  Patient states that generally he has 4 or less headaches per month.  States that the medication continues to work well and that he sometimes has to take a second 1 to completely get rid of the headache.  Discussed options for prophylaxis, other medications such as Imitrex for reduction of headaches.

## 2020-12-18 NOTE — PROGRESS NOTES
Chief Complaint   Patient presents with   • Medication Refill     Fioricet, ambien   • Orders Needed     Cardiologist        HISTORY OF PRESENT ILLNESS: Patient is a 70 y.o. male established patient who presents today to     Patient's brother was recently diagnosed with an abdominal aortic aneurysm he is scheduled for surgery AAA is 7 cm per patient recollection.  Patient states that his dad  suddenly at age 46 and there is concern that this was related to the same issue.  As such patients brother's physician recommended that patient discuss this and get a referral to cardiology.     Migraine without aura and without status migrainosus, not intractable  Chronic in nature.  Patient states that recently he has had some increase in headaches.  Patient states that he has noticed with increased stress from Covid that he has have at least one headache per week but states that sometimes it has been even more frequent.  Patient states that generally he has 4 or less headaches per month.  States that the medication continues to work well and that he sometimes has to take a second 1 to completely get rid of the headache.  Discussed options for prophylaxis, other medications such as Imitrex for reduction of headaches.    Insomnia  Chronic in nature.  Patient states he has severe insomnia.  Patient is counseled regarding risks of medication states that Ambien works well and he does not have side effects.  Helps him stay asleep and fall asleep.  Patient states there have been times when he has not slept for up to 72 hours related to insomnia.  Patient does go to bed around the same time every evening and continues to stop use of bluelight hours prior to bed.  Patient wishes to continue medication at this time.      Patient Active Problem List    Diagnosis Date Noted   • Benign prostatic hyperplasia 2019   • Spasm of lumbar paraspinous muscle 2019   • Acute right-sided low back pain without sciatica 2019   •  Herpes zoster without complication 04/01/2019   • Chronic use of opiate drug for therapeutic purpose 01/02/2019   • Status post right hip replacement 08/23/2018   • Chronic right hip pain 06/28/2018   • Migraine without aura and without status migrainosus, not intractable 04/24/2018   • Urinary frequency 06/29/2017   • Alcoholic liver disease, unspecified ([facility]) 06/29/2017   • Gastroesophageal reflux disease with esophagitis 08/05/2015   • Lumbar radiculopathy 05/15/2015   • DDD (degenerative disc disease), lumbar 05/15/2015   • Chronic back pain 05/15/2015   • Diverticulitis 11/19/2014   • Insomnia 11/19/2014   • Alcohol dependence in remission ([facility]) 11/19/2014   • Seasonal allergies 10/29/2014       Allergies:Pantoprazole and Pantoprazole    Current Outpatient Medications   Medication Sig Dispense Refill   • PANTOPRAZOLE SODIUM PO Take 20 mg by mouth as needed.     • butalbital/apap/caffeine -40 mg (FIORICET) -40 MG Tab Take 1 Tab by mouth every 6 hours as needed for Headache for up to 30 days. 30 Tab 0   • zolpidem (AMBIEN) 10 MG Tab Take 1 Tab by mouth at bedtime as needed for Sleep for up to 30 days. 30 Tab 2   • benzonatate (TESSALON) 100 MG Cap benzonatate 100 mg capsule     • fluticasone (FLONASE) 50 MCG/ACT nasal spray Use 1 spray(s) in each nostril once daily 16 g 0   • methocarbamol (ROBAXIN) 750 MG Tab Take 1 Tab by mouth 4 times a day. 120 Tab 0   • vitamin D (CHOLECALCIFEROL) 1000 UNIT Tab Take 1,000 Units by mouth every day.     • amoxicillin (AMOXIL) 500 MG Cap amoxicillin 500 mg capsule       No current facility-administered medications for this visit.        Social History     Tobacco Use   • Smoking status: Never Smoker   • Smokeless tobacco: Never Used   • Tobacco comment: +second-hand exposure   Substance Use Topics   • Alcohol use: No     Alcohol/week: 0.0 oz     Types: 30 - 40 Shots of liquor per week     Comment: quit 4  years    • Drug use: No     Comment: quit marijuana use  "7/3/18       Family Status   Relation Name Status   • Mo 87    • Fa 46    • Bro  Alive   • MGMo     • MGFa     • PGMo     • PGFa       Family History   Problem Relation Age of Onset   • Dementia Mother    • Heart Disease Father        Review of Systems:   Constitutional:  Negative for fever, chills, weight loss and malaise/fatigue.   HEENT:  Negative for ear pain, nosebleeds, congestion, sore throat and neck pain.    Eyes:  Negative for blurred vision.   Respiratory:  Negative for cough, sputum production, shortness of breath and wheezing.    Cardiovascular:  Negative for chest pain, palpitations, orthopnea and leg swelling.   Gastrointestinal:  Negative for heartburn, nausea, vomiting and abdominal pain.   Genitourinary:  Negative for dysuria, urgency and frequency.   Musculoskeletal:  Negative for myalgias, back pain and joint pain.   Skin:  Negative for rash and itching.   Neurological:  Negative for dizziness, tingling, tremors, sensory change, focal weakness and+headaches.   Endo/Heme/Allergies:  Does not bruise/bleed easily.   Psychiatric/Behavioral:  Negative for depression, suicidal ideas and memory loss.  The patient is not nervous/anxious and does not have insomnia.    All other systems reviewed and are negative except as in HPI.    Exam:  /62 (BP Location: Left arm, Patient Position: Sitting, BP Cuff Size: Adult)   Pulse 80   Temp 36.9 °C (98.4 °F) (Temporal)   Resp 16   Ht 1.753 m (5' 9\")   Wt 67.9 kg (149 lb 9.6 oz)   SpO2 97%   General:  Normal appearing. No distress.  Pulmonary:  Clear to ausculation.  Normal effort. No rales, ronchi, or wheezing.  Cardiovascular:  Regular rate and rhythm without murmur. Carotid and radial pulses are intact and equal bilaterally.  Neurologic:  Grossly nonfocal  Skin: Warm and dry.  No obvious lesions.  Musculoskeletal: Normal gait. No extremity cyanosis, clubbing, or edema.  Psych:  Normal mood and affect. " Alert and oriented x3. Judgment and insight is normal.      PLAN:    1. Migraine without aura and without status migrainosus, not intractable  Continue current medication.  Daily prophylaxis or abortive medication was discussed patient states that at this time he would like to see if headaches improve and go back to the previous less frequent schedule.  Will contact this provider if he needs to discuss a different medication patient was concerned regarding side effects of medications.  - butalbital/apap/caffeine -40 mg (FIORICET) -40 MG Tab; Take 1 Tab by mouth every 6 hours as needed for Headache for up to 30 days.  Dispense: 30 Tab; Refill: 0    2. Primary insomnia  Continue current medication.  Patient is advised of the risks.  - zolpidem (AMBIEN) 10 MG Tab; Take 1 Tab by mouth at bedtime as needed for Sleep for up to 30 days.  Dispense: 30 Tab; Refill: 2    3. Family history of abdominal aortic aneurysm (AAA)  - REFERRAL TO CARDIOLOGY    4. Family history of early sudden death  - REFERRAL TO CARDIOLOGY    Follow-up in 6 months or sooner as needed. Patient is encouraged to be seen in the emergency room for chest pain, palpitations, shortness of breath, dizziness, severe abdominal pain or other concerning symptoms.    Please note that this dictation was created using voice recognition software. I have made every reasonable attempt to correct obvious errors, but I expect that there are errors of grammar and possibly content that I did not discover before finalizing the note.    Assessment/Plan:  1. Migraine without aura and without status migrainosus, not intractable  butalbital/apap/caffeine -40 mg (FIORICET) -40 MG Tab   2. Primary insomnia  zolpidem (AMBIEN) 10 MG Tab   3. Family history of abdominal aortic aneurysm (AAA)  REFERRAL TO CARDIOLOGY   4. Family history of early sudden death  REFERRAL TO CARDIOLOGY          I have placed the below orders and discussed them with an approved  delegating provider. The MA is performing the below orders under the direction of Dr. Frank.

## 2020-12-29 NOTE — TELEPHONE ENCOUNTER
Received request via: Patient    Was the patient seen in the last year in this department? Yes    Does the patient have an active prescription (recently filled or refills available) for medication(s) requested? No     Patient requesting  Antibiotic he sees his dentist and wants to know if before he goes in you can prescribe to help against infections.

## 2020-12-31 RX ORDER — AMOXICILLIN 500 MG/1
CAPSULE ORAL
Qty: 30 CAP | OUTPATIENT
Start: 2020-12-31

## 2021-01-06 NOTE — TELEPHONE ENCOUNTER
VOICEMAIL  1. Caller Name: Vickey Turner                      Call Back Number: [phone] (home)     2. Message: Pt called asking for a refill on this medication, he needs it due to his artificial hip.  He must take prior to dentist visits.  He has a visit coming up soon, and so is requesting a refill.

## 2021-01-07 RX ORDER — AMOXICILLIN 500 MG/1
CAPSULE ORAL
Qty: 30 CAP | Refills: 0 | OUTPATIENT
Start: 2021-01-07

## 2021-01-07 NOTE — TELEPHONE ENCOUNTER
Phone Number Called: [phone] (home)     Call outcome: Left detailed message for patient. Informed to call back with any additional questions.    Message: DELROY and Sukhwinder message sent

## 2021-01-13 ENCOUNTER — APPOINTMENT (RX ONLY)
Dept: URBAN - METROPOLITAN AREA CLINIC 20 | Facility: CLINIC | Age: 71
Setting detail: DERMATOLOGY
End: 2021-01-13

## 2021-01-13 DIAGNOSIS — D18.0 HEMANGIOMA: ICD-10-CM

## 2021-01-13 DIAGNOSIS — Z85.828 PERSONAL HISTORY OF OTHER MALIGNANT NEOPLASM OF SKIN: ICD-10-CM

## 2021-01-13 DIAGNOSIS — L81.4 OTHER MELANIN HYPERPIGMENTATION: ICD-10-CM

## 2021-01-13 DIAGNOSIS — L82.1 OTHER SEBORRHEIC KERATOSIS: ICD-10-CM

## 2021-01-13 DIAGNOSIS — D22 MELANOCYTIC NEVI: ICD-10-CM

## 2021-01-13 DIAGNOSIS — L57.8 OTHER SKIN CHANGES DUE TO CHRONIC EXPOSURE TO NONIONIZING RADIATION: ICD-10-CM

## 2021-01-13 DIAGNOSIS — L57.0 ACTINIC KERATOSIS: ICD-10-CM

## 2021-01-13 PROBLEM — D18.01 HEMANGIOMA OF SKIN AND SUBCUTANEOUS TISSUE: Status: ACTIVE | Noted: 2021-01-13

## 2021-01-13 PROBLEM — D22.5 MELANOCYTIC NEVI OF TRUNK: Status: ACTIVE | Noted: 2021-01-13

## 2021-01-13 PROBLEM — D48.5 NEOPLASM OF UNCERTAIN BEHAVIOR OF SKIN: Status: ACTIVE | Noted: 2021-01-13

## 2021-01-13 PROCEDURE — 17000 DESTRUCT PREMALG LESION: CPT | Mod: 59

## 2021-01-13 PROCEDURE — 17003 DESTRUCT PREMALG LES 2-14: CPT

## 2021-01-13 PROCEDURE — ? COUNSELING

## 2021-01-13 PROCEDURE — 11102 TANGNTL BX SKIN SINGLE LES: CPT

## 2021-01-13 PROCEDURE — ? BIOPSY BY SHAVE METHOD

## 2021-01-13 PROCEDURE — 99213 OFFICE O/P EST LOW 20 MIN: CPT | Mod: 25

## 2021-01-13 PROCEDURE — ? LIQUID NITROGEN

## 2021-01-13 ASSESSMENT — LOCATION SIMPLE DESCRIPTION DERM
LOCATION SIMPLE: RIGHT ANTERIOR NECK
LOCATION SIMPLE: RIGHT POSTERIOR THIGH
LOCATION SIMPLE: LEFT UPPER BACK
LOCATION SIMPLE: LEFT UPPER ARM
LOCATION SIMPLE: RIGHT TEMPLE
LOCATION SIMPLE: LEFT SCALP
LOCATION SIMPLE: LEFT TEMPLE
LOCATION SIMPLE: RIGHT UPPER BACK
LOCATION SIMPLE: LEFT POSTERIOR THIGH
LOCATION SIMPLE: RIGHT CHEEK
LOCATION SIMPLE: LEFT FOREARM
LOCATION SIMPLE: RIGHT HAND
LOCATION SIMPLE: LEFT HAND
LOCATION SIMPLE: RIGHT UPPER ARM

## 2021-01-13 ASSESSMENT — LOCATION DETAILED DESCRIPTION DERM
LOCATION DETAILED: RIGHT SUPERIOR UPPER BACK
LOCATION DETAILED: RIGHT MID TEMPLE
LOCATION DETAILED: RIGHT ANTERIOR LATERAL PROXIMAL UPPER ARM
LOCATION DETAILED: RIGHT INFERIOR ANTERIOR NECK
LOCATION DETAILED: LEFT RADIAL DORSAL HAND
LOCATION DETAILED: LEFT DISTAL POSTERIOR THIGH
LOCATION DETAILED: LEFT DISTAL DORSAL FOREARM
LOCATION DETAILED: RIGHT DISTAL POSTERIOR UPPER ARM
LOCATION DETAILED: LEFT SUPERIOR TEMPLE
LOCATION DETAILED: RIGHT INFERIOR CENTRAL MALAR CHEEK
LOCATION DETAILED: LEFT DISTAL POSTERIOR UPPER ARM
LOCATION DETAILED: RIGHT RADIAL DORSAL HAND
LOCATION DETAILED: LEFT SUPERIOR UPPER BACK
LOCATION DETAILED: LEFT LATERAL FRONTAL SCALP
LOCATION DETAILED: LEFT PROXIMAL POSTERIOR UPPER ARM
LOCATION DETAILED: RIGHT DISTAL LATERAL POSTERIOR THIGH
LOCATION DETAILED: RIGHT INFERIOR MEDIAL UPPER BACK

## 2021-01-13 ASSESSMENT — LOCATION ZONE DERM
LOCATION ZONE: ARM
LOCATION ZONE: LEG
LOCATION ZONE: SCALP
LOCATION ZONE: NECK
LOCATION ZONE: TRUNK
LOCATION ZONE: FACE
LOCATION ZONE: HAND

## 2021-03-03 DIAGNOSIS — Z23 NEED FOR VACCINATION: ICD-10-CM

## 2022-05-26 ENCOUNTER — APPOINTMENT (RX ONLY)
Dept: URBAN - METROPOLITAN AREA CLINIC 20 | Facility: CLINIC | Age: 72
Setting detail: DERMATOLOGY
End: 2022-05-26

## 2022-05-26 DIAGNOSIS — D22 MELANOCYTIC NEVI: ICD-10-CM

## 2022-05-26 DIAGNOSIS — L72.8 OTHER FOLLICULAR CYSTS OF THE SKIN AND SUBCUTANEOUS TISSUE: ICD-10-CM

## 2022-05-26 DIAGNOSIS — L81.4 OTHER MELANIN HYPERPIGMENTATION: ICD-10-CM

## 2022-05-26 DIAGNOSIS — L82.1 OTHER SEBORRHEIC KERATOSIS: ICD-10-CM

## 2022-05-26 DIAGNOSIS — D18.0 HEMANGIOMA: ICD-10-CM

## 2022-05-26 DIAGNOSIS — Z85.828 PERSONAL HISTORY OF OTHER MALIGNANT NEOPLASM OF SKIN: ICD-10-CM

## 2022-05-26 DIAGNOSIS — L57.0 ACTINIC KERATOSIS: ICD-10-CM

## 2022-05-26 DIAGNOSIS — L57.8 OTHER SKIN CHANGES DUE TO CHRONIC EXPOSURE TO NONIONIZING RADIATION: ICD-10-CM

## 2022-05-26 PROBLEM — D48.5 NEOPLASM OF UNCERTAIN BEHAVIOR OF SKIN: Status: ACTIVE | Noted: 2022-05-26

## 2022-05-26 PROBLEM — D18.01 HEMANGIOMA OF SKIN AND SUBCUTANEOUS TISSUE: Status: ACTIVE | Noted: 2022-05-26

## 2022-05-26 PROBLEM — D22.5 MELANOCYTIC NEVI OF TRUNK: Status: ACTIVE | Noted: 2022-05-26

## 2022-05-26 PROCEDURE — ? BIOPSY BY SHAVE METHOD

## 2022-05-26 PROCEDURE — 17004 DESTROY PREMAL LESIONS 15/>: CPT

## 2022-05-26 PROCEDURE — ? LIQUID NITROGEN

## 2022-05-26 PROCEDURE — 11102 TANGNTL BX SKIN SINGLE LES: CPT | Mod: 59

## 2022-05-26 PROCEDURE — 99213 OFFICE O/P EST LOW 20 MIN: CPT | Mod: 25

## 2022-05-26 PROCEDURE — ? COUNSELING

## 2022-05-26 ASSESSMENT — LOCATION SIMPLE DESCRIPTION DERM
LOCATION SIMPLE: LEFT HAND
LOCATION SIMPLE: RIGHT UPPER ARM
LOCATION SIMPLE: LEFT UPPER BACK
LOCATION SIMPLE: POSTERIOR NECK
LOCATION SIMPLE: RIGHT INDEX FINGER
LOCATION SIMPLE: LEFT UPPER ARM
LOCATION SIMPLE: NOSE
LOCATION SIMPLE: RIGHT CHEEK
LOCATION SIMPLE: RIGHT ANTERIOR NECK
LOCATION SIMPLE: RIGHT ZYGOMA
LOCATION SIMPLE: LEFT ANTERIOR NECK
LOCATION SIMPLE: SCALP
LOCATION SIMPLE: LEFT POSTERIOR THIGH
LOCATION SIMPLE: LEFT EAR
LOCATION SIMPLE: RIGHT HAND
LOCATION SIMPLE: RIGHT UPPER BACK
LOCATION SIMPLE: RIGHT EAR
LOCATION SIMPLE: RIGHT POSTERIOR THIGH

## 2022-05-26 ASSESSMENT — LOCATION DETAILED DESCRIPTION DERM
LOCATION DETAILED: RIGHT PROXIMAL DORSAL INDEX FINGER
LOCATION DETAILED: 2ND WEB SPACE RIGHT HAND
LOCATION DETAILED: COLUMELLA
LOCATION DETAILED: RIGHT SUPERIOR UPPER BACK
LOCATION DETAILED: RIGHT INFERIOR MEDIAL UPPER BACK
LOCATION DETAILED: RIGHT INFERIOR CENTRAL MALAR CHEEK
LOCATION DETAILED: LEFT DORSAL MIDDLE METACARPOPHALANGEAL JOINT
LOCATION DETAILED: LEFT RADIAL DORSAL HAND
LOCATION DETAILED: RIGHT SUPERIOR CRUS OF ANTIHELIX
LOCATION DETAILED: RIGHT RADIAL DORSAL HAND
LOCATION DETAILED: RIGHT DISTAL POSTERIOR UPPER ARM
LOCATION DETAILED: RIGHT POSTERIOR NECK
LOCATION DETAILED: LEFT SUPERIOR POSTAURICULAR SKIN
LOCATION DETAILED: RIGHT MEDIAL ZYGOMA
LOCATION DETAILED: LEFT DORSAL INDEX METACARPOPHALANGEAL JOINT
LOCATION DETAILED: LEFT ULNAR DORSAL HAND
LOCATION DETAILED: LEFT CLAVICULAR NECK
LOCATION DETAILED: LEFT SUPERIOR UPPER BACK
LOCATION DETAILED: LEFT DISTAL POSTERIOR UPPER ARM
LOCATION DETAILED: RIGHT SUPERIOR LATERAL MALAR CHEEK
LOCATION DETAILED: LEFT DISTAL POSTERIOR THIGH
LOCATION DETAILED: LEFT SCAPHA
LOCATION DETAILED: RIGHT DISTAL LATERAL POSTERIOR THIGH
LOCATION DETAILED: RIGHT INFERIOR ANTERIOR NECK
LOCATION DETAILED: LEFT INFERIOR POSTAURICULAR SKIN

## 2022-05-26 ASSESSMENT — LOCATION ZONE DERM
LOCATION ZONE: FACE
LOCATION ZONE: LEG
LOCATION ZONE: NOSE
LOCATION ZONE: TRUNK
LOCATION ZONE: NECK
LOCATION ZONE: EAR
LOCATION ZONE: FINGER
LOCATION ZONE: HAND
LOCATION ZONE: ARM
LOCATION ZONE: SCALP

## 2022-06-28 ENCOUNTER — APPOINTMENT (RX ONLY)
Dept: URBAN - METROPOLITAN AREA CLINIC 20 | Facility: CLINIC | Age: 72
Setting detail: DERMATOLOGY
End: 2022-06-28

## 2022-06-28 PROBLEM — C44.622 SQUAMOUS CELL CARCINOMA OF SKIN OF RIGHT UPPER LIMB, INCLUDING SHOULDER: Status: ACTIVE | Noted: 2022-06-28

## 2022-06-28 PROCEDURE — ? EXCISION

## 2022-06-28 PROCEDURE — 12032 INTMD RPR S/A/T/EXT 2.6-7.5: CPT

## 2022-06-28 PROCEDURE — 11602 EXC TR-EXT MAL+MARG 1.1-2 CM: CPT

## 2022-06-28 NOTE — PROCEDURE: MIPS QUALITY
Detail Level: Detailed
Quality 130: Documentation Of Current Medications In The Medical Record: Current Medications Documented
Quality 431: Preventive Care And Screening: Unhealthy Alcohol Use - Screening: Patient screened for unhealthy alcohol use using a single question and scores less than 2 times per year
Quality 402: Tobacco Use And Help With Quitting Among Adolescents: Patient screened for tobacco and never smoked
Quality 226: Preventive Care And Screening: Tobacco Use: Screening And Cessation Intervention: Patient screened for tobacco use and is an ex/non-smoker
Quality 111:Pneumonia Vaccination Status For Older Adults: Pneumococcal Vaccination Previously Received

## 2022-06-28 NOTE — PROCEDURE: EXCISION
Biopsy Photograph Reviewed: Yes
Accession #: [id number]
Size Of Lesion In Cm: 1.2
X Size Of Lesion In Cm (Optional): 0
Size Of Margin In Cm: 0.3
Anesthesia Volume In Cc: 6
Was An Eye Clamp Used?: No
Eye Clamp Note Details: An eye clamp was used during the procedure.
Excision Method: Elliptical
Saucerization Depth: dermis and superficial adipose tissue
Repair Type: Intermediate
Suturegard Retention Suture: 2-0 Nylon
Retention Suture Bite Size: 3 mm
Length To Time In Minutes Device Was In Place: 10
Number Of Hemigard Strips Per Side: 1
Intermediate / Complex Repair - Final Wound Length In Cm: 5.4
Undermining Type: Entire Wound
Debridement Text: The wound edges were debrided prior to proceeding with the closure to facilitate wound healing.
Helical Rim Text: The closure involved the helical rim.
Vermilion Border Text: The closure involved the vermilion border.
Nostril Rim Text: The closure involved the nostril rim.
Retention Suture Text: Retention sutures were placed to support the closure and prevent dehiscence.
Suture Removal: 11 days
Lab: [id number]
Lab Facility: 
Graft Donor Site Bandage (Optional-Leave Blank If You Don't Want In Note): Steri-strips and a pressure bandage were applied to the donor site.
Epidermal Closure Graft Donor Site (Optional): simple interrupted
Billing Type: Third-Party Bill
Excision Depth: adipose tissue
Scalpel Size: 15 blade
Anesthesia Type: 1% lidocaine with epinephrine and a 1:10 solution of 8.4% sodium bicarbonate
Hemostasis: Electrocautery
Estimated Blood Loss (Cc): minimal
Detail Level: Detailed
Anesthesia Type: 1% lidocaine with 1:100,000 epinephrine and a 1:10 solution of 8.4% sodium bicarbonate
Deep Sutures: 3-0 Polysorb
Dermal Closure: buried vertical mattress
Epidermal Sutures: 4-0 Nylon
Epidermal Closure: running
Wound Care: Bacitracin
Dressing: dry sterile dressing
Suturegard Intro: Intraoperative tissue expansion was performed, utilizing the SUTUREGARD device, in order to reduce wound tension.
Suturegard Body: The suture ends were repeatedly re-tightened and re-clamped to achieve the desired tissue expansion.
Hemigard Intro: Due to skin fragility and wound tension, it was decided to use HEMIGARD adhesive retention suture devices to permit a linear closure. The skin was cleaned and dried for a 6cm distance away from the wound. Excessive hair, if present, was removed to allow for adhesion.
Hemigard Postcare Instructions: The HEMIGARD strips are to remain completely dry for at least 5-7 days.
Positioning (Leave Blank If You Do Not Want): The patient was placed in a comfortable position exposing the surgical site.
Pre-Excision Curettage Text (Leave Blank If You Do Not Want): Prior to drawing the surgical margin the visible lesion was removed with electrodesiccation and curettage to clearly define the lesion size.
Complex Repair Preamble Text (Leave Blank If You Do Not Want): Extensive wide undermining was performed.
Intermediate Repair Preamble Text (Leave Blank If You Do Not Want): Undermining was performed with blunt dissection.
Curvilinear Excision Additional Text (Leave Blank If You Do Not Want): The margin was drawn around the clinically apparent lesion.  A curvilinear shape was then drawn on the skin incorporating the lesion and margins.  Incisions were then made along these lines to the appropriate tissue plane and the lesion was extirpated.
Fusiform Excision Additional Text (Leave Blank If You Do Not Want): The margin was drawn around the clinically apparent lesion.  A fusiform shape was then drawn on the skin incorporating the lesion and margins.  Incisions were then made along these lines to the appropriate tissue plane and the lesion was extirpated.
Elliptical Excision Additional Text (Leave Blank If You Do Not Want): The margin was drawn around the clinically apparent lesion.  An elliptical shape was then drawn on the skin incorporating the lesion and margins.  Incisions were then made along these lines to the appropriate tissue plane and the lesion was extirpated.
Saucerization Excision Additional Text (Leave Blank If You Do Not Want): The margin was drawn around the clinically apparent lesion.  Incisions were then made along these lines, in a tangential fashion, to the appropriate tissue plane and the lesion was extirpated.
Slit Excision Additional Text (Leave Blank If You Do Not Want): A linear line was drawn on the skin overlying the lesion. An incision was made slowly until the lesion was visualized.  Once visualized, the lesion was removed with blunt dissection.
Excisional Biopsy Additional Text (Leave Blank If You Do Not Want): The margin was drawn around the clinically apparent lesion. An elliptical shape was then drawn on the skin incorporating the lesion and margins.  Incisions were then made along these lines to the appropriate tissue plane and the lesion was extirpated.
Perilesional Excision Additional Text (Leave Blank If You Do Not Want): The margin was drawn around the clinically apparent lesion. Incisions were then made along these lines to the appropriate tissue plane and the lesion was extirpated.
Repair Performed By Another Provider Text (Leave Blank If You Do Not Want): After the tissue was excised the defect was repaired by another provider.
No Repair - Repaired With Adjacent Surgical Defect Text (Leave Blank If You Do Not Want): After the excision the defect was repaired concurrently with another surgical defect which was in close approximation.
Adjacent Tissue Transfer Text: The defect edges were debeveled with a #15 scalpel blade.  Given the location of the defect and the proximity to free margins an adjacent tissue transfer was deemed most appropriate.  Using a sterile surgical marker, an appropriate flap was drawn incorporating the defect and placing the expected incisions within the relaxed skin tension lines where possible.    The area thus outlined was incised deep to adipose tissue with a #15 scalpel blade.  The skin margins were undermined to an appropriate distance in all directions utilizing iris scissors.
Advancement Flap (Single) Text: The defect edges were debeveled with a #15 scalpel blade.  Given the location of the defect and the proximity to free margins a single advancement flap was deemed most appropriate.  Using a sterile surgical marker, an appropriate advancement flap was drawn incorporating the defect and placing the expected incisions within the relaxed skin tension lines where possible.    The area thus outlined was incised deep to adipose tissue with a #15 scalpel blade.  The skin margins were undermined to an appropriate distance in all directions utilizing iris scissors.
Advancement Flap (Double) Text: The defect edges were debeveled with a #15 scalpel blade.  Given the location of the defect and the proximity to free margins a double advancement flap was deemed most appropriate.  Using a sterile surgical marker, the appropriate advancement flaps were drawn incorporating the defect and placing the expected incisions within the relaxed skin tension lines where possible.    The area thus outlined was incised deep to adipose tissue with a #15 scalpel blade.  The skin margins were undermined to an appropriate distance in all directions utilizing iris scissors.
Burow's Advancement Flap Text: The defect edges were debeveled with a #15 scalpel blade.  Given the location of the defect and the proximity to free margins a Burow's advancement flap was deemed most appropriate.  Using a sterile surgical marker, the appropriate advancement flap was drawn incorporating the defect and placing the expected incisions within the relaxed skin tension lines where possible.    The area thus outlined was incised deep to adipose tissue with a #15 scalpel blade.  The skin margins were undermined to an appropriate distance in all directions utilizing iris scissors.
Chonodrocutaneous Helical Advancement Flap Text: The defect edges were debeveled with a #15 scalpel blade.  Given the location of the defect and the proximity to free margins a chondrocutaneous helical advancement flap was deemed most appropriate.  Using a sterile surgical marker, the appropriate advancement flap was drawn incorporating the defect and placing the expected incisions within the relaxed skin tension lines where possible.    The area thus outlined was incised deep to adipose tissue with a #15 scalpel blade.  The skin margins were undermined to an appropriate distance in all directions utilizing iris scissors.
Crescentic Advancement Flap Text: The defect edges were debeveled with a #15 scalpel blade.  Given the location of the defect and the proximity to free margins a crescentic advancement flap was deemed most appropriate.  Using a sterile surgical marker, the appropriate advancement flap was drawn incorporating the defect and placing the expected incisions within the relaxed skin tension lines where possible.    The area thus outlined was incised deep to adipose tissue with a #15 scalpel blade.  The skin margins were undermined to an appropriate distance in all directions utilizing iris scissors.
A-T Advancement Flap Text: The defect edges were debeveled with a #15 scalpel blade.  Given the location of the defect, shape of the defect and the proximity to free margins an A-T advancement flap was deemed most appropriate.  Using a sterile surgical marker, an appropriate advancement flap was drawn incorporating the defect and placing the expected incisions within the relaxed skin tension lines where possible.    The area thus outlined was incised deep to adipose tissue with a #15 scalpel blade.  The skin margins were undermined to an appropriate distance in all directions utilizing iris scissors.
O-T Advancement Flap Text: The defect edges were debeveled with a #15 scalpel blade.  Given the location of the defect, shape of the defect and the proximity to free margins an O-T advancement flap was deemed most appropriate.  Using a sterile surgical marker, an appropriate advancement flap was drawn incorporating the defect and placing the expected incisions within the relaxed skin tension lines where possible.    The area thus outlined was incised deep to adipose tissue with a #15 scalpel blade.  The skin margins were undermined to an appropriate distance in all directions utilizing iris scissors.
O-L Flap Text: The defect edges were debeveled with a #15 scalpel blade.  Given the location of the defect, shape of the defect and the proximity to free margins an O-L flap was deemed most appropriate.  Using a sterile surgical marker, an appropriate advancement flap was drawn incorporating the defect and placing the expected incisions within the relaxed skin tension lines where possible.    The area thus outlined was incised deep to adipose tissue with a #15 scalpel blade.  The skin margins were undermined to an appropriate distance in all directions utilizing iris scissors.
O-Z Flap Text: The defect edges were debeveled with a #15 scalpel blade.  Given the location of the defect, shape of the defect and the proximity to free margins an O-Z flap was deemed most appropriate.  Using a sterile surgical marker, an appropriate transposition flap was drawn incorporating the defect and placing the expected incisions within the relaxed skin tension lines where possible. The area thus outlined was incised deep to adipose tissue with a #15 scalpel blade.  The skin margins were undermined to an appropriate distance in all directions utilizing iris scissors.
Double O-Z Flap Text: The defect edges were debeveled with a #15 scalpel blade.  Given the location of the defect, shape of the defect and the proximity to free margins a Double O-Z flap was deemed most appropriate.  Using a sterile surgical marker, an appropriate transposition flap was drawn incorporating the defect and placing the expected incisions within the relaxed skin tension lines where possible. The area thus outlined was incised deep to adipose tissue with a #15 scalpel blade.  The skin margins were undermined to an appropriate distance in all directions utilizing iris scissors.
V-Y Flap Text: The defect edges were debeveled with a #15 scalpel blade.  Given the location of the defect, shape of the defect and the proximity to free margins a V-Y flap was deemed most appropriate.  Using a sterile surgical marker, an appropriate advancement flap was drawn incorporating the defect and placing the expected incisions within the relaxed skin tension lines where possible.    The area thus outlined was incised deep to adipose tissue with a #15 scalpel blade.  The skin margins were undermined to an appropriate distance in all directions utilizing iris scissors.
Advancement-Rotation Flap Text: The defect edges were debeveled with a #15 scalpel blade.  Given the location of the defect, shape of the defect and the proximity to free margins an advancement-rotation flap was deemed most appropriate.  Using a sterile surgical marker, an appropriate flap was drawn incorporating the defect and placing the expected incisions within the relaxed skin tension lines where possible. The area thus outlined was incised deep to adipose tissue with a #15 scalpel blade.  The skin margins were undermined to an appropriate distance in all directions utilizing iris scissors.
Mercedes Flap Text: The defect edges were debeveled with a #15 scalpel blade.  Given the location of the defect, shape of the defect and the proximity to free margins a Mercedes flap was deemed most appropriate.  Using a sterile surgical marker, an appropriate advancement flap was drawn incorporating the defect and placing the expected incisions within the relaxed skin tension lines where possible. The area thus outlined was incised deep to adipose tissue with a #15 scalpel blade.  The skin margins were undermined to an appropriate distance in all directions utilizing iris scissors.
Modified Advancement Flap Text: The defect edges were debeveled with a #15 scalpel blade.  Given the location of the defect, shape of the defect and the proximity to free margins a modified advancement flap was deemed most appropriate.  Using a sterile surgical marker, an appropriate advancement flap was drawn incorporating the defect and placing the expected incisions within the relaxed skin tension lines where possible.    The area thus outlined was incised deep to adipose tissue with a #15 scalpel blade.  The skin margins were undermined to an appropriate distance in all directions utilizing iris scissors.
Mucosal Advancement Flap Text: Given the location of the defect, shape of the defect and the proximity to free margins a mucosal advancement flap was deemed most appropriate. Incisions were made with a 15 blade scalpel in the appropriate fashion along the cutaneous vermilion border and the mucosal lip. The remaining actinically damaged mucosal tissue was excised.  The mucosal advancement flap was then elevated to the gingival sulcus with care taken to preserve the neurovascular structures and advanced into the primary defect. Care was taken to ensure that precise realignment of the vermilion border was achieved.
Peng Advancement Flap Text: The defect edges were debeveled with a #15 scalpel blade.  Given the location of the defect, shape of the defect and the proximity to free margins a Peng advancement flap was deemed most appropriate.  Using a sterile surgical marker, an appropriate advancement flap was drawn incorporating the defect and placing the expected incisions within the relaxed skin tension lines where possible. The area thus outlined was incised deep to adipose tissue with a #15 scalpel blade.  The skin margins were undermined to an appropriate distance in all directions utilizing iris scissors.
Hatchet Flap Text: The defect edges were debeveled with a #15 scalpel blade.  Given the location of the defect, shape of the defect and the proximity to free margins a hatchet flap was deemed most appropriate.  Using a sterile surgical marker, an appropriate hatchet flap was drawn incorporating the defect and placing the expected incisions within the relaxed skin tension lines where possible.    The area thus outlined was incised deep to adipose tissue with a #15 scalpel blade.  The skin margins were undermined to an appropriate distance in all directions utilizing iris scissors.
Rotation Flap Text: The defect edges were debeveled with a #15 scalpel blade.  Given the location of the defect, shape of the defect and the proximity to free margins a rotation flap was deemed most appropriate.  Using a sterile surgical marker, an appropriate rotation flap was drawn incorporating the defect and placing the expected incisions within the relaxed skin tension lines where possible.    The area thus outlined was incised deep to adipose tissue with a #15 scalpel blade.  The skin margins were undermined to an appropriate distance in all directions utilizing iris scissors.
Spiral Flap Text: The defect edges were debeveled with a #15 scalpel blade.  Given the location of the defect, shape of the defect and the proximity to free margins a spiral flap was deemed most appropriate.  Using a sterile surgical marker, an appropriate rotation flap was drawn incorporating the defect and placing the expected incisions within the relaxed skin tension lines where possible. The area thus outlined was incised deep to adipose tissue with a #15 scalpel blade.  The skin margins were undermined to an appropriate distance in all directions utilizing iris scissors.
Staged Advancement Flap Text: The defect edges were debeveled with a #15 scalpel blade.  Given the location of the defect, shape of the defect and the proximity to free margins a staged advancement flap was deemed most appropriate.  Using a sterile surgical marker, an appropriate advancement flap was drawn incorporating the defect and placing the expected incisions within the relaxed skin tension lines where possible. The area thus outlined was incised deep to adipose tissue with a #15 scalpel blade.  The skin margins were undermined to an appropriate distance in all directions utilizing iris scissors.
Star Wedge Flap Text: The defect edges were debeveled with a #15 scalpel blade.  Given the location of the defect, shape of the defect and the proximity to free margins a star wedge flap was deemed most appropriate.  Using a sterile surgical marker, an appropriate rotation flap was drawn incorporating the defect and placing the expected incisions within the relaxed skin tension lines where possible. The area thus outlined was incised deep to adipose tissue with a #15 scalpel blade.  The skin margins were undermined to an appropriate distance in all directions utilizing iris scissors.
Transposition Flap Text: The defect edges were debeveled with a #15 scalpel blade.  Given the location of the defect and the proximity to free margins a transposition flap was deemed most appropriate.  Using a sterile surgical marker, an appropriate transposition flap was drawn incorporating the defect.    The area thus outlined was incised deep to adipose tissue with a #15 scalpel blade.  The skin margins were undermined to an appropriate distance in all directions utilizing iris scissors.
Muscle Hinge Flap Text: The defect edges were debeveled with a #15 scalpel blade.  Given the size, depth and location of the defect and the proximity to free margins a muscle hinge flap was deemed most appropriate.  Using a sterile surgical marker, an appropriate hinge flap was drawn incorporating the defect. The area thus outlined was incised with a #15 scalpel blade.  The skin margins were undermined to an appropriate distance in all directions utilizing iris scissors.
Mustarde Flap Text: The defect edges were debeveled with a #15 scalpel blade.  Given the size, depth and location of the defect and the proximity to free margins a Mustarde flap was deemed most appropriate.  Using a sterile surgical marker, an appropriate flap was drawn incorporating the defect. The area thus outlined was incised with a #15 scalpel blade.  The skin margins were undermined to an appropriate distance in all directions utilizing iris scissors.
Nasal Turnover Hinge Flap Text: The defect edges were debeveled with a #15 scalpel blade.  Given the size, depth, location of the defect and the defect being full thickness a nasal turnover hinge flap was deemed most appropriate.  Using a sterile surgical marker, an appropriate hinge flap was drawn incorporating the defect. The area thus outlined was incised with a #15 scalpel blade. The flap was designed to recreate the nasal mucosal lining and the alar rim. The skin margins were undermined to an appropriate distance in all directions utilizing iris scissors.
Nasalis-Muscle-Based Myocutaneous Island Pedicle Flap Text: Using a #15 blade, an incision was made around the donor flap to the level of the nasalis muscle. Wide lateral undermining was then performed in both the subcutaneous plane above the nasalis muscle, and in a submuscular plane just above periosteum. This allowed the formation of a free nasalis muscle axial pedicle (based on the angular artery) which was still attached to the actual cutaneous flap, increasing its mobility and vascular viability. Hemostasis was obtained with pinpoint electrocoagulation. The flap was mobilized into position and the pivotal anchor points positioned and stabilized with buried interrupted sutures. Subcutaneous and dermal tissues were closed in a multilayered fashion with sutures. Tissue redundancies were excised, and the epidermal edges were apposed without significant tension and sutured with sutures.
Orbicularis Oris Muscle Flap Text: The defect edges were debeveled with a #15 scalpel blade.  Given that the defect affected the competency of the oral sphincter an orbicularis oris muscle flap was deemed most appropriate to restore this competency and normal muscle function.  Using a sterile surgical marker, an appropriate flap was drawn incorporating the defect. The area thus outlined was incised with a #15 scalpel blade.
Melolabial Transposition Flap Text: The defect edges were debeveled with a #15 scalpel blade.  Given the location of the defect and the proximity to free margins a melolabial flap was deemed most appropriate.  Using a sterile surgical marker, an appropriate melolabial transposition flap was drawn incorporating the defect.    The area thus outlined was incised deep to adipose tissue with a #15 scalpel blade.  The skin margins were undermined to an appropriate distance in all directions utilizing iris scissors.
Rhombic Flap Text: The defect edges were debeveled with a #15 scalpel blade.  Given the location of the defect and the proximity to free margins a rhombic flap was deemed most appropriate.  Using a sterile surgical marker, an appropriate rhombic flap was drawn incorporating the defect.    The area thus outlined was incised deep to adipose tissue with a #15 scalpel blade.  The skin margins were undermined to an appropriate distance in all directions utilizing iris scissors.
Rhomboid Transposition Flap Text: The defect edges were debeveled with a #15 scalpel blade.  Given the location of the defect and the proximity to free margins a rhomboid transposition flap was deemed most appropriate.  Using a sterile surgical marker, an appropriate rhomboid flap was drawn incorporating the defect.    The area thus outlined was incised deep to adipose tissue with a #15 scalpel blade.  The skin margins were undermined to an appropriate distance in all directions utilizing iris scissors.
Bi-Rhombic Flap Text: The defect edges were debeveled with a #15 scalpel blade.  Given the location of the defect and the proximity to free margins a bi-rhombic flap was deemed most appropriate.  Using a sterile surgical marker, an appropriate rhombic flap was drawn incorporating the defect. The area thus outlined was incised deep to adipose tissue with a #15 scalpel blade.  The skin margins were undermined to an appropriate distance in all directions utilizing iris scissors.
Helical Rim Advancement Flap Text: The defect edges were debeveled with a #15 blade scalpel.  Given the location of the defect and the proximity to free margins (helical rim) a double helical rim advancement flap was deemed most appropriate.  Using a sterile surgical marker, the appropriate advancement flaps were drawn incorporating the defect and placing the expected incisions between the helical rim and antihelix where possible.  The area thus outlined was incised through and through with a #15 scalpel blade.  With a skin hook and iris scissors, the flaps were gently and sharply undermined and freed up.
Bilateral Helical Rim Advancement Flap Text: The defect edges were debeveled with a #15 blade scalpel.  Given the location of the defect and the proximity to free margins (helical rim) a bilateral helical rim advancement flap was deemed most appropriate.  Using a sterile surgical marker, the appropriate advancement flaps were drawn incorporating the defect and placing the expected incisions between the helical rim and antihelix where possible.  The area thus outlined was incised through and through with a #15 scalpel blade.  With a skin hook and iris scissors, the flaps were gently and sharply undermined and freed up.
Ear Star Wedge Flap Text: The defect edges were debeveled with a #15 blade scalpel.  Given the location of the defect and the proximity to free margins (helical rim) an ear star wedge flap was deemed most appropriate.  Using a sterile surgical marker, the appropriate flap was drawn incorporating the defect and placing the expected incisions between the helical rim and antihelix where possible.  The area thus outlined was incised through and through with a #15 scalpel blade.
Banner Transposition Flap Text: The defect edges were debeveled with a #15 scalpel blade.  Given the location of the defect and the proximity to free margins a Banner transposition flap was deemed most appropriate.  Using a sterile surgical marker, an appropriate flap drawn around the defect. The area thus outlined was incised deep to adipose tissue with a #15 scalpel blade.  The skin margins were undermined to an appropriate distance in all directions utilizing iris scissors.
Bilobed Flap Text: The defect edges were debeveled with a #15 scalpel blade.  Given the location of the defect and the proximity to free margins a bilobe flap was deemed most appropriate.  Using a sterile surgical marker, an appropriate bilobe flap drawn around the defect.    The area thus outlined was incised deep to adipose tissue with a #15 scalpel blade.  The skin margins were undermined to an appropriate distance in all directions utilizing iris scissors.
Bilobed Transposition Flap Text: The defect edges were debeveled with a #15 scalpel blade.  Given the location of the defect and the proximity to free margins a bilobed transposition flap was deemed most appropriate.  Using a sterile surgical marker, an appropriate bilobe flap drawn around the defect.    The area thus outlined was incised deep to adipose tissue with a #15 scalpel blade.  The skin margins were undermined to an appropriate distance in all directions utilizing iris scissors.
Trilobed Flap Text: The defect edges were debeveled with a #15 scalpel blade.  Given the location of the defect and the proximity to free margins a trilobed flap was deemed most appropriate.  Using a sterile surgical marker, an appropriate trilobed flap drawn around the defect.    The area thus outlined was incised deep to adipose tissue with a #15 scalpel blade.  The skin margins were undermined to an appropriate distance in all directions utilizing iris scissors.
Dorsal Nasal Flap Text: The defect edges were debeveled with a #15 scalpel blade.  Given the location of the defect and the proximity to free margins a dorsal nasal flap was deemed most appropriate.  Using a sterile surgical marker, an appropriate dorsal nasal flap was drawn around the defect.    The area thus outlined was incised deep to adipose tissue with a #15 scalpel blade.  The skin margins were undermined to an appropriate distance in all directions utilizing iris scissors.
Island Pedicle Flap Text: The defect edges were debeveled with a #15 scalpel blade.  Given the location of the defect, shape of the defect and the proximity to free margins an island pedicle advancement flap was deemed most appropriate.  Using a sterile surgical marker, an appropriate advancement flap was drawn incorporating the defect, outlining the appropriate donor tissue and placing the expected incisions within the relaxed skin tension lines where possible.    The area thus outlined was incised deep to adipose tissue with a #15 scalpel blade.  The skin margins were undermined to an appropriate distance in all directions around the primary defect and laterally outward around the island pedicle utilizing iris scissors.  There was minimal undermining beneath the pedicle flap.
Island Pedicle Flap With Canthal Suspension Text: The defect edges were debeveled with a #15 scalpel blade.  Given the location of the defect, shape of the defect and the proximity to free margins an island pedicle advancement flap was deemed most appropriate.  Using a sterile surgical marker, an appropriate advancement flap was drawn incorporating the defect, outlining the appropriate donor tissue and placing the expected incisions within the relaxed skin tension lines where possible. The area thus outlined was incised deep to adipose tissue with a #15 scalpel blade.  The skin margins were undermined to an appropriate distance in all directions around the primary defect and laterally outward around the island pedicle utilizing iris scissors.  There was minimal undermining beneath the pedicle flap. A suspension suture was placed in the canthal tendon to prevent tension and prevent ectropion.
Alar Island Pedicle Flap Text: The defect edges were debeveled with a #15 scalpel blade.  Given the location of the defect, shape of the defect and the proximity to the alar rim an island pedicle advancement flap was deemed most appropriate.  Using a sterile surgical marker, an appropriate advancement flap was drawn incorporating the defect, outlining the appropriate donor tissue and placing the expected incisions within the nasal ala running parallel to the alar rim. The area thus outlined was incised with a #15 scalpel blade.  The skin margins were undermined minimally to an appropriate distance in all directions around the primary defect and laterally outward around the island pedicle utilizing iris scissors.  There was minimal undermining beneath the pedicle flap.
Double Island Pedicle Flap Text: The defect edges were debeveled with a #15 scalpel blade.  Given the location of the defect, shape of the defect and the proximity to free margins a double island pedicle advancement flap was deemed most appropriate.  Using a sterile surgical marker, an appropriate advancement flap was drawn incorporating the defect, outlining the appropriate donor tissue and placing the expected incisions within the relaxed skin tension lines where possible.    The area thus outlined was incised deep to adipose tissue with a #15 scalpel blade.  The skin margins were undermined to an appropriate distance in all directions around the primary defect and laterally outward around the island pedicle utilizing iris scissors.  There was minimal undermining beneath the pedicle flap.
Island Pedicle Flap-Requiring Vessel Identification Text: The defect edges were debeveled with a #15 scalpel blade.  Given the location of the defect, shape of the defect and the proximity to free margins an island pedicle advancement flap was deemed most appropriate.  Using a sterile surgical marker, an appropriate advancement flap was drawn, based on the axial vessel mentioned above, incorporating the defect, outlining the appropriate donor tissue and placing the expected incisions within the relaxed skin tension lines where possible.    The area thus outlined was incised deep to adipose tissue with a #15 scalpel blade.  The skin margins were undermined to an appropriate distance in all directions around the primary defect and laterally outward around the island pedicle utilizing iris scissors.  There was minimal undermining beneath the pedicle flap.
Keystone Flap Text: The defect edges were debeveled with a #15 scalpel blade.  Given the location of the defect, shape of the defect a keystone flap was deemed most appropriate.  Using a sterile surgical marker, an appropriate keystone flap was drawn incorporating the defect, outlining the appropriate donor tissue and placing the expected incisions within the relaxed skin tension lines where possible. The area thus outlined was incised deep to adipose tissue with a #15 scalpel blade.  The skin margins were undermined to an appropriate distance in all directions around the primary defect and laterally outward around the flap utilizing iris scissors.
O-T Plasty Text: The defect edges were debeveled with a #15 scalpel blade.  Given the location of the defect, shape of the defect and the proximity to free margins an O-T plasty was deemed most appropriate.  Using a sterile surgical marker, an appropriate O-T plasty was drawn incorporating the defect and placing the expected incisions within the relaxed skin tension lines where possible.    The area thus outlined was incised deep to adipose tissue with a #15 scalpel blade.  The skin margins were undermined to an appropriate distance in all directions utilizing iris scissors.
O-Z Plasty Text: The defect edges were debeveled with a #15 scalpel blade.  Given the location of the defect, shape of the defect and the proximity to free margins an O-Z plasty (double transposition flap) was deemed most appropriate.  Using a sterile surgical marker, the appropriate transposition flaps were drawn incorporating the defect and placing the expected incisions within the relaxed skin tension lines where possible.    The area thus outlined was incised deep to adipose tissue with a #15 scalpel blade.  The skin margins were undermined to an appropriate distance in all directions utilizing iris scissors.  Hemostasis was achieved with electrocautery.  The flaps were then transposed into place, one clockwise and the other counterclockwise, and anchored with interrupted buried subcutaneous sutures.
Double O-Z Plasty Text: The defect edges were debeveled with a #15 scalpel blade.  Given the location of the defect, shape of the defect and the proximity to free margins a Double O-Z plasty (double transposition flap) was deemed most appropriate.  Using a sterile surgical marker, the appropriate transposition flaps were drawn incorporating the defect and placing the expected incisions within the relaxed skin tension lines where possible. The area thus outlined was incised deep to adipose tissue with a #15 scalpel blade.  The skin margins were undermined to an appropriate distance in all directions utilizing iris scissors.  Hemostasis was achieved with electrocautery.  The flaps were then transposed into place, one clockwise and the other counterclockwise, and anchored with interrupted buried subcutaneous sutures.
V-Y Plasty Text: The defect edges were debeveled with a #15 scalpel blade.  Given the location of the defect, shape of the defect and the proximity to free margins an V-Y advancement flap was deemed most appropriate.  Using a sterile surgical marker, an appropriate advancement flap was drawn incorporating the defect and placing the expected incisions within the relaxed skin tension lines where possible.    The area thus outlined was incised deep to adipose tissue with a #15 scalpel blade.  The skin margins were undermined to an appropriate distance in all directions utilizing iris scissors.
H Plasty Text: Given the location of the defect, shape of the defect and the proximity to free margins a H-plasty was deemed most appropriate for repair.  Using a sterile surgical marker, the appropriate advancement arms of the H-plasty were drawn incorporating the defect and placing the expected incisions within the relaxed skin tension lines where possible. The area thus outlined was incised deep to adipose tissue with a #15 scalpel blade. The skin margins were undermined to an appropriate distance in all directions utilizing iris scissors.  The opposing advancement arms were then advanced into place in opposite direction and anchored with interrupted buried subcutaneous sutures.
W Plasty Text: The lesion was extirpated to the level of the fat with a #15 scalpel blade.  Given the location of the defect, shape of the defect and the proximity to free margins a W-plasty was deemed most appropriate for repair.  Using a sterile surgical marker, the appropriate transposition arms of the W-plasty were drawn incorporating the defect and placing the expected incisions within the relaxed skin tension lines where possible.    The area thus outlined was incised deep to adipose tissue with a #15 scalpel blade.  The skin margins were undermined to an appropriate distance in all directions utilizing iris scissors.  The opposing transposition arms were then transposed into place in opposite direction and anchored with interrupted buried subcutaneous sutures.
Z Plasty Text: The lesion was extirpated to the level of the fat with a #15 scalpel blade.  Given the location of the defect, shape of the defect and the proximity to free margins a Z-plasty was deemed most appropriate for repair.  Using a sterile surgical marker, the appropriate transposition arms of the Z-plasty were drawn incorporating the defect and placing the expected incisions within the relaxed skin tension lines where possible.    The area thus outlined was incised deep to adipose tissue with a #15 scalpel blade.  The skin margins were undermined to an appropriate distance in all directions utilizing iris scissors.  The opposing transposition arms were then transposed into place in opposite direction and anchored with interrupted buried subcutaneous sutures.
Zygomaticofacial Flap Text: Given the location of the defect, shape of the defect and the proximity to free margins a zygomaticofacial flap was deemed most appropriate for repair.  Using a sterile surgical marker, the appropriate flap was drawn incorporating the defect and placing the expected incisions within the relaxed skin tension lines where possible. The area thus outlined was incised deep to adipose tissue with a #15 scalpel blade with preservation of a vascular pedicle.  The skin margins were undermined to an appropriate distance in all directions utilizing iris scissors.  The flap was then placed into the defect and anchored with interrupted buried subcutaneous sutures.
Cheek Interpolation Flap Text: A decision was made to reconstruct the defect utilizing an interpolation axial flap and a staged reconstruction.  A telfa template was made of the defect.  This telfa template was then used to outline the Cheek Interpolation flap.  The donor area for the pedicle flap was then injected with anesthesia.  The flap was excised through the skin and subcutaneous tissue down to the layer of the underlying musculature.  The interpolation flap was carefully excised within this deep plane to maintain its blood supply.  The edges of the donor site were undermined.   The donor site was closed in a primary fashion.  The pedicle was then rotated into position and sutured.  Once the tube was sutured into place, adequate blood supply was confirmed with blanching and refill.  The pedicle was then wrapped with xeroform gauze and dressed appropriately with a telfa and gauze bandage to ensure continued blood supply and protect the attached pedicle.
Cheek-To-Nose Interpolation Flap Text: A decision was made to reconstruct the defect utilizing an interpolation axial flap and a staged reconstruction.  A telfa template was made of the defect.  This telfa template was then used to outline the Cheek-To-Nose Interpolation flap.  The donor area for the pedicle flap was then injected with anesthesia.  The flap was excised through the skin and subcutaneous tissue down to the layer of the underlying musculature.  The interpolation flap was carefully excised within this deep plane to maintain its blood supply.  The edges of the donor site were undermined.   The donor site was closed in a primary fashion.  The pedicle was then rotated into position and sutured.  Once the tube was sutured into place, adequate blood supply was confirmed with blanching and refill.  The pedicle was then wrapped with xeroform gauze and dressed appropriately with a telfa and gauze bandage to ensure continued blood supply and protect the attached pedicle.
Interpolation Flap Text: A decision was made to reconstruct the defect utilizing an interpolation axial flap and a staged reconstruction.  A telfa template was made of the defect.  This telfa template was then used to outline the interpolation flap.  The donor area for the pedicle flap was then injected with anesthesia.  The flap was excised through the skin and subcutaneous tissue down to the layer of the underlying musculature.  The interpolation flap was carefully excised within this deep plane to maintain its blood supply.  The edges of the donor site were undermined.   The donor site was closed in a primary fashion.  The pedicle was then rotated into position and sutured.  Once the tube was sutured into place, adequate blood supply was confirmed with blanching and refill.  The pedicle was then wrapped with xeroform gauze and dressed appropriately with a telfa and gauze bandage to ensure continued blood supply and protect the attached pedicle.
Melolabial Interpolation Flap Text: A decision was made to reconstruct the defect utilizing an interpolation axial flap and a staged reconstruction.  A telfa template was made of the defect.  This telfa template was then used to outline the melolabial interpolation flap.  The donor area for the pedicle flap was then injected with anesthesia.  The flap was excised through the skin and subcutaneous tissue down to the layer of the underlying musculature.  The pedicle flap was carefully excised within this deep plane to maintain its blood supply.  The edges of the donor site were undermined.   The donor site was closed in a primary fashion.  The pedicle was then rotated into position and sutured.  Once the tube was sutured into place, adequate blood supply was confirmed with blanching and refill.  The pedicle was then wrapped with xeroform gauze and dressed appropriately with a telfa and gauze bandage to ensure continued blood supply and protect the attached pedicle.
Mastoid Interpolation Flap Text: A decision was made to reconstruct the defect utilizing an interpolation axial flap and a staged reconstruction.  A telfa template was made of the defect.  This telfa template was then used to outline the mastoid interpolation flap.  The donor area for the pedicle flap was then injected with anesthesia.  The flap was excised through the skin and subcutaneous tissue down to the layer of the underlying musculature.  The pedicle flap was carefully excised within this deep plane to maintain its blood supply.  The edges of the donor site were undermined.   The donor site was closed in a primary fashion.  The pedicle was then rotated into position and sutured.  Once the tube was sutured into place, adequate blood supply was confirmed with blanching and refill.  The pedicle was then wrapped with xeroform gauze and dressed appropriately with a telfa and gauze bandage to ensure continued blood supply and protect the attached pedicle.
Posterior Auricular Interpolation Flap Text: A decision was made to reconstruct the defect utilizing an interpolation axial flap and a staged reconstruction.  A telfa template was made of the defect.  This telfa template was then used to outline the posterior auricular interpolation flap.  The donor area for the pedicle flap was then injected with anesthesia.  The flap was excised through the skin and subcutaneous tissue down to the layer of the underlying musculature.  The pedicle flap was carefully excised within this deep plane to maintain its blood supply.  The edges of the donor site were undermined.   The donor site was closed in a primary fashion.  The pedicle was then rotated into position and sutured.  Once the tube was sutured into place, adequate blood supply was confirmed with blanching and refill.  The pedicle was then wrapped with xeroform gauze and dressed appropriately with a telfa and gauze bandage to ensure continued blood supply and protect the attached pedicle.
Paramedian Forehead Flap Text: A decision was made to reconstruct the defect utilizing an interpolation axial flap and a staged reconstruction.  A telfa template was made of the defect.  This telfa template was then used to outline the paramedian forehead pedicle flap.  The donor area for the pedicle flap was then injected with anesthesia.  The flap was excised through the skin and subcutaneous tissue down to the layer of the underlying musculature.  The pedicle flap was carefully excised within this deep plane to maintain its blood supply.  The edges of the donor site were undermined.   The donor site was closed in a primary fashion.  The pedicle was then rotated into position and sutured.  Once the tube was sutured into place, adequate blood supply was confirmed with blanching and refill.  The pedicle was then wrapped with xeroform gauze and dressed appropriately with a telfa and gauze bandage to ensure continued blood supply and protect the attached pedicle.
Abbe Flap (Upper To Lower Lip) Text: The defect of the lower lip was assessed and measured.  Given the location and size of the defect, an Abbe flap was deemed most appropriate.  Using a sterile surgical marker, an appropriate Abbe flap was measured and drawn on the upper lip. Local anesthesia was then infiltrated.  A scalpel was then used to incise the upper lip through and through the skin, vermilion, muscle and mucosa, leaving the flap pedicled on the opposite side.  The flap was then rotated and transferred to the lower lip defect.  The flap was then sutured into place with a three layer technique, closing the orbicularis oris muscle layer with subcutaneous buried sutures, followed by a mucosal layer and an epidermal layer.
Abbe Flap (Lower To Upper Lip) Text: The defect of the upper lip was assessed and measured.  Given the location and size of the defect, an Abbe flap was deemed most appropriate.  Using a sterile surgical marker, an appropriate Abbe flap was measured and drawn on the lower lip. Local anesthesia was then infiltrated. A scalpel was then used to incise the upper lip through and through the skin, vermilion, muscle and mucosa, leaving the flap pedicled on the opposite side.  The flap was then rotated and transferred to the lower lip defect.  The flap was then sutured into place with a three layer technique, closing the orbicularis oris muscle layer with subcutaneous buried sutures, followed by a mucosal layer and an epidermal layer.
Estlander Flap (Upper To Lower Lip) Text: The defect of the lower lip was assessed and measured.  Given the location and size of the defect, an Estlander flap was deemed most appropriate.  Using a sterile surgical marker, an appropriate Estlander flap was measured and drawn on the upper lip. Local anesthesia was then infiltrated. A scalpel was then used to incise the lateral aspect of the flap, through skin, muscle and mucosa, leaving the flap pedicled medially.  The flap was then rotated and positioned to fill the lower lip defect.  The flap was then sutured into place with a three layer technique, closing the orbicularis oris muscle layer with subcutaneous buried sutures, followed by a mucosal layer and an epidermal layer.
Lip Wedge Excision Repair Text: Given the location of the defect and the proximity to free margins a full thickness wedge repair was deemed most appropriate.  Using a sterile surgical marker, the appropriate repair was drawn incorporating the defect and placing the expected incisions perpendicular to the vermilion border.  The vermilion border was also meticulously outlined to ensure appropriate reapproximation during the repair.  The area thus outlined was incised through and through with a #15 scalpel blade.  The muscularis and dermis were reaproximated with deep sutures following hemostasis. Care was taken to realign the vermilion border before proceeding with the superficial closure.  Once the vermilion was realigned the superfical and mucosal closure was finished.
Ftsg Text: The defect edges were debeveled with a #15 scalpel blade.  Given the location of the defect, shape of the defect and the proximity to free margins a full thickness skin graft was deemed most appropriate.  Using a sterile surgical marker, the primary defect shape was transferred to the donor site. The area thus outlined was incised deep to adipose tissue with a #15 scalpel blade.  The harvested graft was then trimmed of adipose tissue until only dermis and epidermis was left.  The skin margins of the secondary defect were undermined to an appropriate distance in all directions utilizing iris scissors.  The secondary defect was closed with interrupted buried subcutaneous sutures.  The skin edges were then re-apposed with running  sutures.  The skin graft was then placed in the primary defect and oriented appropriately.
Split-Thickness Skin Graft Text: The defect edges were debeveled with a #15 scalpel blade.  Given the location of the defect, shape of the defect and the proximity to free margins a split thickness skin graft was deemed most appropriate.  Using a sterile surgical marker, the primary defect shape was transferred to the donor site. The split thickness graft was then harvested.  The skin graft was then placed in the primary defect and oriented appropriately.
Burow's Graft Text: The defect edges were debeveled with a #15 scalpel blade.  Given the location of the defect, shape of the defect, the proximity to free margins and the presence of a standing cone deformity a Burow's skin graft was deemed most appropriate. The standing cone was removed and this tissue was then trimmed to the shape of the primary defect. The adipose tissue was also removed until only dermis and epidermis were left.  The skin margins of the secondary defect were undermined to an appropriate distance in all directions utilizing iris scissors.  The secondary defect was closed with interrupted buried subcutaneous sutures.  The skin edges were then re-apposed with running  sutures.  The skin graft was then placed in the primary defect and oriented appropriately.
Cartilage Graft Text: The defect edges were debeveled with a #15 scalpel blade.  Given the location of the defect, shape of the defect, the fact the defect involved a full thickness cartilage defect a cartilage graft was deemed most appropriate.  An appropriate donor site was identified, cleansed, and anesthetized. The cartilage graft was then harvested and transferred to the recipient site, oriented appropriately and then sutured into place.  The secondary defect was then repaired using a primary closure.
Composite Graft Text: The defect edges were debeveled with a #15 scalpel blade.  Given the location of the defect, shape of the defect, the proximity to free margins and the fact the defect was full thickness a composite graft was deemed most appropriate.  The defect was outline and then transferred to the donor site.  A full thickness graft was then excised from the donor site. The graft was then placed in the primary defect, oriented appropriately and then sutured into place.  The secondary defect was then repaired using a primary closure.
Epidermal Autograft Text: The defect edges were debeveled with a #15 scalpel blade.  Given the location of the defect, shape of the defect and the proximity to free margins an epidermal autograft was deemed most appropriate.  Using a sterile surgical marker, the primary defect shape was transferred to the donor site. The epidermal graft was then harvested.  The skin graft was then placed in the primary defect and oriented appropriately.
Dermal Autograft Text: The defect edges were debeveled with a #15 scalpel blade.  Given the location of the defect, shape of the defect and the proximity to free margins a dermal autograft was deemed most appropriate.  Using a sterile surgical marker, the primary defect shape was transferred to the donor site. The area thus outlined was incised deep to adipose tissue with a #15 scalpel blade.  The harvested graft was then trimmed of adipose and epidermal tissue until only dermis was left.  The skin graft was then placed in the primary defect and oriented appropriately.
Skin Substitute Text: The defect edges were debeveled with a #15 scalpel blade.  Given the location of the defect, shape of the defect and the proximity to free margins a skin substitute graft was deemed most appropriate.  The graft material was trimmed to fit the size of the defect. The graft was then placed in the primary defect and oriented appropriately.
Tissue Cultured Epidermal Autograft Text: The defect edges were debeveled with a #15 scalpel blade.  Given the location of the defect, shape of the defect and the proximity to free margins a tissue cultured epidermal autograft was deemed most appropriate.  The graft was then trimmed to fit the size of the defect.  The graft was then placed in the primary defect and oriented appropriately.
Xenograft Text: The defect edges were debeveled with a #15 scalpel blade.  Given the location of the defect, shape of the defect and the proximity to free margins a xenograft was deemed most appropriate.  The graft was then trimmed to fit the size of the defect.  The graft was then placed in the primary defect and oriented appropriately.
Purse String (Intermediate) Text: Given the location of the defect and the characteristics of the surrounding skin a purse string intermediate closure was deemed most appropriate.  Undermining was performed circumfirentially around the surgical defect.  A purse string suture was then placed and tightened.
Purse String (Simple) Text: Given the location of the defect and the characteristics of the surrounding skin a purse string simple closure was deemed most appropriate.  Undermining was performed circumferentially around the surgical defect.  A purse string suture was then placed and tightened.
Partial Purse String (Intermediate) Text: Given the location of the defect and the characteristics of the surrounding skin an intermediate purse string closure was deemed most appropriate.  Undermining was performed circumferentially around the surgical defect.  A purse string suture was then placed and tightened. Wound tension of the circular defect prevented complete closure of the wound.
Partial Purse String (Simple) Text: Given the location of the defect and the characteristics of the surrounding skin a simple purse string closure was deemed most appropriate.  Undermining was performed circumferentially around the surgical defect.  A purse string suture was then placed and tightened. Wound tension of the circular defect prevented complete closure of the wound.
Complex Repair And Single Advancement Flap Text: The defect edges were debeveled with a #15 scalpel blade.  The primary defect was closed partially with a complex linear closure.  Given the location of the remaining defect, shape of the defect and the proximity to free margins a single advancement flap was deemed most appropriate for complete closure of the defect.  Using a sterile surgical marker, an appropriate advancement flap was drawn incorporating the defect and placing the expected incisions within the relaxed skin tension lines where possible.    The area thus outlined was incised deep to adipose tissue with a #15 scalpel blade.  The skin margins were undermined to an appropriate distance in all directions utilizing iris scissors.
Complex Repair And Double Advancement Flap Text: The defect edges were debeveled with a #15 scalpel blade.  The primary defect was closed partially with a complex linear closure.  Given the location of the remaining defect, shape of the defect and the proximity to free margins a double advancement flap was deemed most appropriate for complete closure of the defect.  Using a sterile surgical marker, an appropriate advancement flap was drawn incorporating the defect and placing the expected incisions within the relaxed skin tension lines where possible.    The area thus outlined was incised deep to adipose tissue with a #15 scalpel blade.  The skin margins were undermined to an appropriate distance in all directions utilizing iris scissors.
Complex Repair And Modified Advancement Flap Text: The defect edges were debeveled with a #15 scalpel blade.  The primary defect was closed partially with a complex linear closure.  Given the location of the remaining defect, shape of the defect and the proximity to free margins a modified advancement flap was deemed most appropriate for complete closure of the defect.  Using a sterile surgical marker, an appropriate advancement flap was drawn incorporating the defect and placing the expected incisions within the relaxed skin tension lines where possible.    The area thus outlined was incised deep to adipose tissue with a #15 scalpel blade.  The skin margins were undermined to an appropriate distance in all directions utilizing iris scissors.
Complex Repair And A-T Advancement Flap Text: The defect edges were debeveled with a #15 scalpel blade.  The primary defect was closed partially with a complex linear closure.  Given the location of the remaining defect, shape of the defect and the proximity to free margins an A-T advancement flap was deemed most appropriate for complete closure of the defect.  Using a sterile surgical marker, an appropriate advancement flap was drawn incorporating the defect and placing the expected incisions within the relaxed skin tension lines where possible.    The area thus outlined was incised deep to adipose tissue with a #15 scalpel blade.  The skin margins were undermined to an appropriate distance in all directions utilizing iris scissors.
Complex Repair And O-T Advancement Flap Text: The defect edges were debeveled with a #15 scalpel blade.  The primary defect was closed partially with a complex linear closure.  Given the location of the remaining defect, shape of the defect and the proximity to free margins an O-T advancement flap was deemed most appropriate for complete closure of the defect.  Using a sterile surgical marker, an appropriate advancement flap was drawn incorporating the defect and placing the expected incisions within the relaxed skin tension lines where possible.    The area thus outlined was incised deep to adipose tissue with a #15 scalpel blade.  The skin margins were undermined to an appropriate distance in all directions utilizing iris scissors.
Complex Repair And O-L Flap Text: The defect edges were debeveled with a #15 scalpel blade.  The primary defect was closed partially with a complex linear closure.  Given the location of the remaining defect, shape of the defect and the proximity to free margins an O-L flap was deemed most appropriate for complete closure of the defect.  Using a sterile surgical marker, an appropriate flap was drawn incorporating the defect and placing the expected incisions within the relaxed skin tension lines where possible.    The area thus outlined was incised deep to adipose tissue with a #15 scalpel blade.  The skin margins were undermined to an appropriate distance in all directions utilizing iris scissors.
Complex Repair And Bilobe Flap Text: The defect edges were debeveled with a #15 scalpel blade.  The primary defect was closed partially with a complex linear closure.  Given the location of the remaining defect, shape of the defect and the proximity to free margins a bilobe flap was deemed most appropriate for complete closure of the defect.  Using a sterile surgical marker, an appropriate advancement flap was drawn incorporating the defect and placing the expected incisions within the relaxed skin tension lines where possible.    The area thus outlined was incised deep to adipose tissue with a #15 scalpel blade.  The skin margins were undermined to an appropriate distance in all directions utilizing iris scissors.
Complex Repair And Melolabial Flap Text: The defect edges were debeveled with a #15 scalpel blade.  The primary defect was closed partially with a complex linear closure.  Given the location of the remaining defect, shape of the defect and the proximity to free margins a melolabial flap was deemed most appropriate for complete closure of the defect.  Using a sterile surgical marker, an appropriate advancement flap was drawn incorporating the defect and placing the expected incisions within the relaxed skin tension lines where possible.    The area thus outlined was incised deep to adipose tissue with a #15 scalpel blade.  The skin margins were undermined to an appropriate distance in all directions utilizing iris scissors.
Complex Repair And Rotation Flap Text: The defect edges were debeveled with a #15 scalpel blade.  The primary defect was closed partially with a complex linear closure.  Given the location of the remaining defect, shape of the defect and the proximity to free margins a rotation flap was deemed most appropriate for complete closure of the defect.  Using a sterile surgical marker, an appropriate advancement flap was drawn incorporating the defect and placing the expected incisions within the relaxed skin tension lines where possible.    The area thus outlined was incised deep to adipose tissue with a #15 scalpel blade.  The skin margins were undermined to an appropriate distance in all directions utilizing iris scissors.
Complex Repair And Rhombic Flap Text: The defect edges were debeveled with a #15 scalpel blade.  The primary defect was closed partially with a complex linear closure.  Given the location of the remaining defect, shape of the defect and the proximity to free margins a rhombic flap was deemed most appropriate for complete closure of the defect.  Using a sterile surgical marker, an appropriate advancement flap was drawn incorporating the defect and placing the expected incisions within the relaxed skin tension lines where possible.    The area thus outlined was incised deep to adipose tissue with a #15 scalpel blade.  The skin margins were undermined to an appropriate distance in all directions utilizing iris scissors.
Complex Repair And Transposition Flap Text: The defect edges were debeveled with a #15 scalpel blade.  The primary defect was closed partially with a complex linear closure.  Given the location of the remaining defect, shape of the defect and the proximity to free margins a transposition flap was deemed most appropriate for complete closure of the defect.  Using a sterile surgical marker, an appropriate advancement flap was drawn incorporating the defect and placing the expected incisions within the relaxed skin tension lines where possible.    The area thus outlined was incised deep to adipose tissue with a #15 scalpel blade.  The skin margins were undermined to an appropriate distance in all directions utilizing iris scissors.
Complex Repair And V-Y Plasty Text: The defect edges were debeveled with a #15 scalpel blade.  The primary defect was closed partially with a complex linear closure.  Given the location of the remaining defect, shape of the defect and the proximity to free margins a V-Y plasty was deemed most appropriate for complete closure of the defect.  Using a sterile surgical marker, an appropriate advancement flap was drawn incorporating the defect and placing the expected incisions within the relaxed skin tension lines where possible.    The area thus outlined was incised deep to adipose tissue with a #15 scalpel blade.  The skin margins were undermined to an appropriate distance in all directions utilizing iris scissors.
Complex Repair And M Plasty Text: The defect edges were debeveled with a #15 scalpel blade.  The primary defect was closed partially with a complex linear closure.  Given the location of the remaining defect, shape of the defect and the proximity to free margins an M plasty was deemed most appropriate for complete closure of the defect.  Using a sterile surgical marker, an appropriate advancement flap was drawn incorporating the defect and placing the expected incisions within the relaxed skin tension lines where possible.    The area thus outlined was incised deep to adipose tissue with a #15 scalpel blade.  The skin margins were undermined to an appropriate distance in all directions utilizing iris scissors.
Complex Repair And Double M Plasty Text: The defect edges were debeveled with a #15 scalpel blade.  The primary defect was closed partially with a complex linear closure.  Given the location of the remaining defect, shape of the defect and the proximity to free margins a double M plasty was deemed most appropriate for complete closure of the defect.  Using a sterile surgical marker, an appropriate advancement flap was drawn incorporating the defect and placing the expected incisions within the relaxed skin tension lines where possible.    The area thus outlined was incised deep to adipose tissue with a #15 scalpel blade.  The skin margins were undermined to an appropriate distance in all directions utilizing iris scissors.
Complex Repair And W Plasty Text: The defect edges were debeveled with a #15 scalpel blade.  The primary defect was closed partially with a complex linear closure.  Given the location of the remaining defect, shape of the defect and the proximity to free margins a W plasty was deemed most appropriate for complete closure of the defect.  Using a sterile surgical marker, an appropriate advancement flap was drawn incorporating the defect and placing the expected incisions within the relaxed skin tension lines where possible.    The area thus outlined was incised deep to adipose tissue with a #15 scalpel blade.  The skin margins were undermined to an appropriate distance in all directions utilizing iris scissors.
Complex Repair And Z Plasty Text: The defect edges were debeveled with a #15 scalpel blade.  The primary defect was closed partially with a complex linear closure.  Given the location of the remaining defect, shape of the defect and the proximity to free margins a Z plasty was deemed most appropriate for complete closure of the defect.  Using a sterile surgical marker, an appropriate advancement flap was drawn incorporating the defect and placing the expected incisions within the relaxed skin tension lines where possible.    The area thus outlined was incised deep to adipose tissue with a #15 scalpel blade.  The skin margins were undermined to an appropriate distance in all directions utilizing iris scissors.
Complex Repair And Dorsal Nasal Flap Text: The defect edges were debeveled with a #15 scalpel blade.  The primary defect was closed partially with a complex linear closure.  Given the location of the remaining defect, shape of the defect and the proximity to free margins a dorsal nasal flap was deemed most appropriate for complete closure of the defect.  Using a sterile surgical marker, an appropriate flap was drawn incorporating the defect and placing the expected incisions within the relaxed skin tension lines where possible.    The area thus outlined was incised deep to adipose tissue with a #15 scalpel blade.  The skin margins were undermined to an appropriate distance in all directions utilizing iris scissors.
Complex Repair And Ftsg Text: The defect edges were debeveled with a #15 scalpel blade.  The primary defect was closed partially with a complex linear closure.  Given the location of the defect, shape of the defect and the proximity to free margins a full thickness skin graft was deemed most appropriate to repair the remaining defect.  The graft was trimmed to fit the size of the remaining defect.  The graft was then placed in the primary defect, oriented appropriately, and sutured into place.
Complex Repair And Burow's Graft Text: The defect edges were debeveled with a #15 scalpel blade.  The primary defect was closed partially with a complex linear closure.  Given the location of the defect, shape of the defect, the proximity to free margins and the presence of a standing cone deformity a Burow's graft was deemed most appropriate to repair the remaining defect.  The graft was trimmed to fit the size of the remaining defect.  The graft was then placed in the primary defect, oriented appropriately, and sutured into place.
Complex Repair And Split-Thickness Skin Graft Text: The defect edges were debeveled with a #15 scalpel blade.  The primary defect was closed partially with a complex linear closure.  Given the location of the defect, shape of the defect and the proximity to free margins a split thickness skin graft was deemed most appropriate to repair the remaining defect.  The graft was trimmed to fit the size of the remaining defect.  The graft was then placed in the primary defect, oriented appropriately, and sutured into place.
Complex Repair And Epidermal Autograft Text: The defect edges were debeveled with a #15 scalpel blade.  The primary defect was closed partially with a complex linear closure.  Given the location of the defect, shape of the defect and the proximity to free margins an epidermal autograft was deemed most appropriate to repair the remaining defect.  The graft was trimmed to fit the size of the remaining defect.  The graft was then placed in the primary defect, oriented appropriately, and sutured into place.
Complex Repair And Dermal Autograft Text: The defect edges were debeveled with a #15 scalpel blade.  The primary defect was closed partially with a complex linear closure.  Given the location of the defect, shape of the defect and the proximity to free margins an dermal autograft was deemed most appropriate to repair the remaining defect.  The graft was trimmed to fit the size of the remaining defect.  The graft was then placed in the primary defect, oriented appropriately, and sutured into place.
Complex Repair And Tissue Cultured Epidermal Autograft Text: The defect edges were debeveled with a #15 scalpel blade.  The primary defect was closed partially with a complex linear closure.  Given the location of the defect, shape of the defect and the proximity to free margins an tissue cultured epidermal autograft was deemed most appropriate to repair the remaining defect.  The graft was trimmed to fit the size of the remaining defect.  The graft was then placed in the primary defect, oriented appropriately, and sutured into place.
Complex Repair And Xenograft Text: The defect edges were debeveled with a #15 scalpel blade.  The primary defect was closed partially with a complex linear closure.  Given the location of the defect, shape of the defect and the proximity to free margins a xenograft was deemed most appropriate to repair the remaining defect.  The graft was trimmed to fit the size of the remaining defect.  The graft was then placed in the primary defect, oriented appropriately, and sutured into place.
Complex Repair And Skin Substitute Graft Text: The defect edges were debeveled with a #15 scalpel blade.  The primary defect was closed partially with a complex linear closure.  Given the location of the remaining defect, shape of the defect and the proximity to free margins a skin substitute graft was deemed most appropriate to repair the remaining defect.  The graft was trimmed to fit the size of the remaining defect.  The graft was then placed in the primary defect, oriented appropriately, and sutured into place.
Path Notes (To The Dermatopathologist): Please check margins.
Consent was obtained from the patient. The risks and benefits to therapy were discussed in detail. Specifically, the risks of infection, scarring, bleeding, prolonged wound healing, incomplete removal, allergy to anesthesia, nerve injury and recurrence were addressed. Prior to the procedure, the treatment site was clearly identified and confirmed by the patient. All components of Universal Protocol/PAUSE Rule completed.
Post-Care Instructions: I reviewed with the patient in detail post-care instructions. Patient is not to engage in any heavy lifting, exercise, or swimming for the next 14 days. Should the patient develop any fevers, chills, bleeding, severe pain patient will contact the office immediately.
Home Suture Removal Text: Patient was provided a home suture removal kit and will remove their sutures at home.  If they have any questions or difficulties they will call the office.
Where Do You Want The Question To Include Opioid Counseling Located?: Case Summary Tab
Information: Selecting Yes will display possible errors in your note based on the variables you have selected. This validation is only offered as a suggestion for you. PLEASE NOTE THAT THE VALIDATION TEXT WILL BE REMOVED WHEN YOU FINALIZE YOUR NOTE. IF YOU WANT TO FAX A PRELIMINARY NOTE YOU WILL NEED TO TOGGLE THIS TO 'NO' IF YOU DO NOT WANT IT IN YOUR FAXED NOTE.

## 2022-07-11 ENCOUNTER — APPOINTMENT (RX ONLY)
Dept: URBAN - METROPOLITAN AREA CLINIC 20 | Facility: CLINIC | Age: 72
Setting detail: DERMATOLOGY
End: 2022-07-11

## 2022-07-11 DIAGNOSIS — Z48.02 ENCOUNTER FOR REMOVAL OF SUTURES: ICD-10-CM

## 2022-07-11 PROCEDURE — ? SUTURE REMOVAL (NO GLOBAL PERIOD)

## 2022-07-11 ASSESSMENT — LOCATION DETAILED DESCRIPTION DERM: LOCATION DETAILED: RIGHT PROXIMAL DORSAL FOREARM

## 2022-07-11 ASSESSMENT — LOCATION ZONE DERM: LOCATION ZONE: ARM

## 2022-07-11 ASSESSMENT — LOCATION SIMPLE DESCRIPTION DERM: LOCATION SIMPLE: RIGHT FOREARM

## 2022-07-15 ENCOUNTER — APPOINTMENT (RX ONLY)
Dept: URBAN - METROPOLITAN AREA CLINIC 20 | Facility: CLINIC | Age: 72
Setting detail: DERMATOLOGY
End: 2022-07-15

## 2022-07-15 DIAGNOSIS — S31000A OPEN WOUND(S) (MULTIPLE) OF UNSPECIFIED SITE(S), WITHOUT MENTION OF COMPLICATION: ICD-10-CM

## 2022-07-15 PROBLEM — S51.801A UNSPECIFIED OPEN WOUND OF RIGHT FOREARM, INITIAL ENCOUNTER: Status: ACTIVE | Noted: 2022-07-15

## 2022-07-15 PROCEDURE — 99212 OFFICE O/P EST SF 10 MIN: CPT

## 2022-07-15 PROCEDURE — ? ADDITIONAL NOTES

## 2022-07-15 PROCEDURE — ? COUNSELING

## 2022-07-15 ASSESSMENT — LOCATION DETAILED DESCRIPTION DERM: LOCATION DETAILED: RIGHT PROXIMAL DORSAL FOREARM

## 2022-07-15 ASSESSMENT — LOCATION SIMPLE DESCRIPTION DERM: LOCATION SIMPLE: RIGHT FOREARM

## 2022-07-15 ASSESSMENT — LOCATION ZONE DERM: LOCATION ZONE: ARM

## 2022-07-15 NOTE — PROCEDURE: ADDITIONAL NOTES
Render Risk Assessment In Note?: no
Additional Notes: Photo documentation today. \\nCleansed area with maxicleanse and applied bacitracin under tefla with coban wrap on todays visit. \\n\\nPt given maxicleanse to take home and cleanse area, given bacitracin to apply after. Given bandaids to cover affected area. \\nGiven direct line to call Monday if needed for further appt.
Detail Level: Detailed

## 2022-07-15 NOTE — HPI: WOUND CHECK
How Is Your Wound Healing?: healing slowly
Additional History: Pt had excision of SCC on June 28th 2022 w/ STACIE Shaffer. Wound opened up partially after [id number]/[id number]

## 2022-08-25 ENCOUNTER — APPOINTMENT (RX ONLY)
Dept: URBAN - METROPOLITAN AREA CLINIC 20 | Facility: CLINIC | Age: 72
Setting detail: DERMATOLOGY
End: 2022-08-25

## 2022-08-25 DIAGNOSIS — L82.1 OTHER SEBORRHEIC KERATOSIS: ICD-10-CM

## 2022-08-25 DIAGNOSIS — L57.0 ACTINIC KERATOSIS: ICD-10-CM

## 2022-08-25 DIAGNOSIS — L57.8 OTHER SKIN CHANGES DUE TO CHRONIC EXPOSURE TO NONIONIZING RADIATION: ICD-10-CM

## 2022-08-25 PROCEDURE — 17004 DESTROY PREMAL LESIONS 15/>: CPT

## 2022-08-25 PROCEDURE — ? LIQUID NITROGEN

## 2022-08-25 PROCEDURE — ? ADDITIONAL NOTES

## 2022-08-25 PROCEDURE — ? COUNSELING

## 2022-08-25 PROCEDURE — 99213 OFFICE O/P EST LOW 20 MIN: CPT | Mod: 25

## 2022-08-25 PROCEDURE — ? PHOTODYNAMIC THERAPY COUNSELING

## 2022-08-25 ASSESSMENT — LOCATION DETAILED DESCRIPTION DERM
LOCATION DETAILED: LEFT INFERIOR CENTRAL MALAR CHEEK
LOCATION DETAILED: RIGHT PROXIMAL DORSAL RING FINGER
LOCATION DETAILED: LEFT MEDIAL ZYGOMA
LOCATION DETAILED: NASAL DORSUM
LOCATION DETAILED: INFERIOR MID FOREHEAD
LOCATION DETAILED: 3RD WEB SPACE LEFT HAND
LOCATION DETAILED: RIGHT DORSAL MIDDLE METACARPOPHALANGEAL JOINT
LOCATION DETAILED: RIGHT MEDIAL TEMPLE
LOCATION DETAILED: LEFT DISTAL DORSAL FOREARM
LOCATION DETAILED: RIGHT RADIAL DORSAL HAND
LOCATION DETAILED: LEFT RADIAL DORSAL HAND
LOCATION DETAILED: NASAL TIP
LOCATION DETAILED: RIGHT ULNAR DORSAL HAND
LOCATION DETAILED: RIGHT SUPERIOR MEDIAL MALAR CHEEK
LOCATION DETAILED: LEFT ULNAR DORSAL HAND
LOCATION DETAILED: RIGHT DORSAL RING METACARPOPHALANGEAL JOINT
LOCATION DETAILED: LEFT DORSAL INDEX METACARPOPHALANGEAL JOINT
LOCATION DETAILED: RIGHT INFERIOR CENTRAL MALAR CHEEK
LOCATION DETAILED: RIGHT MID DORSAL RING FINGER

## 2022-08-25 ASSESSMENT — LOCATION ZONE DERM
LOCATION ZONE: FACE
LOCATION ZONE: ARM
LOCATION ZONE: NOSE
LOCATION ZONE: HAND
LOCATION ZONE: FINGER

## 2022-08-25 ASSESSMENT — LOCATION SIMPLE DESCRIPTION DERM
LOCATION SIMPLE: LEFT CHEEK
LOCATION SIMPLE: LEFT HAND
LOCATION SIMPLE: RIGHT HAND
LOCATION SIMPLE: LEFT FOREARM
LOCATION SIMPLE: NOSE
LOCATION SIMPLE: INFERIOR FOREHEAD
LOCATION SIMPLE: RIGHT TEMPLE
LOCATION SIMPLE: RIGHT RING FINGER
LOCATION SIMPLE: LEFT ZYGOMA
LOCATION SIMPLE: RIGHT CHEEK

## 2022-08-25 NOTE — PROCEDURE: ADDITIONAL NOTES
Detail Level: Simple
Render Risk Assessment In Note?: no
Additional Notes: Spoke to patient about options if spot on nose still present next appointment will consider biopsy

## 2022-08-25 NOTE — PROCEDURE: MIPS QUALITY
Quality 226: Preventive Care And Screening: Tobacco Use: Screening And Cessation Intervention: Patient screened for tobacco use and is an ex/non-smoker
Quality 111:Pneumonia Vaccination Status For Older Adults: Pneumococcal Vaccination Previously Received
Detail Level: Detailed
Quality 130: Documentation Of Current Medications In The Medical Record: Current Medications Documented
Quality 402: Tobacco Use And Help With Quitting Among Adolescents: Patient screened for tobacco and never smoked
Quality 431: Preventive Care And Screening: Unhealthy Alcohol Use - Screening: Patient screened for unhealthy alcohol use using a single question and scores less than 2 times per year

## 2022-10-25 ENCOUNTER — APPOINTMENT (RX ONLY)
Dept: URBAN - METROPOLITAN AREA CLINIC 20 | Facility: CLINIC | Age: 72
Setting detail: DERMATOLOGY
End: 2022-10-25

## 2022-10-25 DIAGNOSIS — Z85.828 PERSONAL HISTORY OF OTHER MALIGNANT NEOPLASM OF SKIN: ICD-10-CM

## 2022-10-25 DIAGNOSIS — L57.8 OTHER SKIN CHANGES DUE TO CHRONIC EXPOSURE TO NONIONIZING RADIATION: ICD-10-CM

## 2022-10-25 DIAGNOSIS — D22 MELANOCYTIC NEVI: ICD-10-CM

## 2022-10-25 DIAGNOSIS — L57.0 ACTINIC KERATOSIS: ICD-10-CM

## 2022-10-25 DIAGNOSIS — D18.0 HEMANGIOMA: ICD-10-CM

## 2022-10-25 DIAGNOSIS — L82.1 OTHER SEBORRHEIC KERATOSIS: ICD-10-CM

## 2022-10-25 DIAGNOSIS — D485 NEOPLASM OF UNCERTAIN BEHAVIOR OF SKIN: ICD-10-CM

## 2022-10-25 DIAGNOSIS — L81.4 OTHER MELANIN HYPERPIGMENTATION: ICD-10-CM

## 2022-10-25 PROBLEM — D22.5 MELANOCYTIC NEVI OF TRUNK: Status: ACTIVE | Noted: 2022-10-25

## 2022-10-25 PROBLEM — D48.5 NEOPLASM OF UNCERTAIN BEHAVIOR OF SKIN: Status: ACTIVE | Noted: 2022-10-25

## 2022-10-25 PROBLEM — D18.01 HEMANGIOMA OF SKIN AND SUBCUTANEOUS TISSUE: Status: ACTIVE | Noted: 2022-10-25

## 2022-10-25 PROCEDURE — ? DIAGNOSIS COMMENT

## 2022-10-25 PROCEDURE — 99213 OFFICE O/P EST LOW 20 MIN: CPT | Mod: 25

## 2022-10-25 PROCEDURE — ? COUNSELING

## 2022-10-25 PROCEDURE — ? PATIENT SPECIFIC COUNSELING

## 2022-10-25 PROCEDURE — 17000 DESTRUCT PREMALG LESION: CPT

## 2022-10-25 PROCEDURE — 17003 DESTRUCT PREMALG LES 2-14: CPT

## 2022-10-25 PROCEDURE — ? LIQUID NITROGEN

## 2022-10-25 ASSESSMENT — LOCATION DETAILED DESCRIPTION DERM
LOCATION DETAILED: RIGHT INFERIOR ANTERIOR NECK
LOCATION DETAILED: RIGHT SUPERIOR CENTRAL MALAR CHEEK
LOCATION DETAILED: LEFT DISTAL DORSAL FOREARM
LOCATION DETAILED: LEFT SUPERIOR UPPER BACK
LOCATION DETAILED: LEFT DISTAL POSTERIOR UPPER ARM
LOCATION DETAILED: RIGHT RADIAL DORSAL HAND
LOCATION DETAILED: RIGHT DISTAL POSTERIOR UPPER ARM
LOCATION DETAILED: RIGHT POSTERIOR SHOULDER
LOCATION DETAILED: LEFT RADIAL DORSAL HAND
LOCATION DETAILED: RIGHT INFERIOR MEDIAL UPPER BACK
LOCATION DETAILED: LEFT DORSAL MIDDLE METACARPOPHALANGEAL JOINT
LOCATION DETAILED: LEFT ULNAR DORSAL HAND
LOCATION DETAILED: RIGHT PROXIMAL DORSAL FOREARM
LOCATION DETAILED: RIGHT ULNAR DORSAL HAND
LOCATION DETAILED: RIGHT SUPERIOR UPPER BACK
LOCATION DETAILED: RIGHT INFERIOR TEMPLE
LOCATION DETAILED: RIGHT DORSAL MIDDLE METACARPOPHALANGEAL JOINT
LOCATION DETAILED: LEFT DISTAL POSTERIOR THIGH
LOCATION DETAILED: RIGHT INFERIOR CENTRAL MALAR CHEEK
LOCATION DETAILED: RIGHT DISTAL LATERAL POSTERIOR THIGH

## 2022-10-25 ASSESSMENT — LOCATION SIMPLE DESCRIPTION DERM
LOCATION SIMPLE: LEFT POSTERIOR THIGH
LOCATION SIMPLE: LEFT FOREARM
LOCATION SIMPLE: RIGHT HAND
LOCATION SIMPLE: LEFT UPPER BACK
LOCATION SIMPLE: RIGHT ANTERIOR NECK
LOCATION SIMPLE: LEFT HAND
LOCATION SIMPLE: RIGHT SHOULDER
LOCATION SIMPLE: RIGHT POSTERIOR THIGH
LOCATION SIMPLE: RIGHT FOREARM
LOCATION SIMPLE: RIGHT CHEEK
LOCATION SIMPLE: RIGHT UPPER BACK
LOCATION SIMPLE: RIGHT TEMPLE
LOCATION SIMPLE: RIGHT UPPER ARM
LOCATION SIMPLE: LEFT UPPER ARM

## 2022-10-25 ASSESSMENT — LOCATION ZONE DERM
LOCATION ZONE: FACE
LOCATION ZONE: LEG
LOCATION ZONE: TRUNK
LOCATION ZONE: NECK
LOCATION ZONE: ARM
LOCATION ZONE: HAND

## 2022-10-25 NOTE — PROCEDURE: DIAGNOSIS COMMENT
Render Risk Assessment In Note?: no
Comment: Next to surgical SCC scar, monitor
Detail Level: Simple

## 2023-01-16 NOTE — ASSESSMENT & PLAN NOTE
Annual exam ages postmenopausal    Inga Saenz is a No obstetric history on file. ,  64 y.o. female WHITE/NON- No LMP recorded. (Menstrual status: Menopause). .    She presents for her annual checkup. She is having  questions concerning Noah  being the correct dose due to her severe scoliosis . Has an appointment with endocrine next month. S/p left partial mastectomy ; DCIS receptor negative. Lengthy discussion regarding osteopenia, back issues and hx breast cancer         Menstrual status:    Now menopausal  She is currently using Activella as ERT    Sexual history:    She  reports being sexually active and has had partner(s) who are male. She reports using the following method of birth control/protection: Surgical.    Medical conditions:    Since her last annual GYN exam about one year ago, she has not the following changes in her health history: none. Pap and Mammogram History:    Her most recent Pap smear was normal, obtained 1 year(s) ago. The patient had a recent mammogram 2022 which was negative for malignancy. Breast Cancer History/Substance Abuse: negative    Osteoporosis History:    Family history does not include a first or second degree relative with osteopenia or osteoporosis. Past Medical History:   Diagnosis Date    Breast cancer ([address] 2009    LEFT, DCIS    Cancer ([facility] [address]     left breast cancer    Degenerative lumbar disc     Fibroid uterus     Radiation therapy complication      Past Surgical History:   Procedure Laterality Date    HX BREAST BIOPSY Left     benign    HX BREAST LUMPECTOMY Left     HX GYN          AK MASTECTOMY PARTIAL         Current Outpatient Medications   Medication Sig Dispense Refill    zolpidem (AMBIEN) 5 mg tablet TK 1/2 TO 1 T PO QHS 30 Tablet 2    OTHER Vitamin B 6      estradiol-norethindrone (Activella) 0.5-0.1 mg per tablet Take 1 Tablet by mouth daily.  30 Tablet 12    ibuprofen (MOTRIN) 400 mg tablet Take  by mouth Chronic in nature. Stable. History of alcoholism no drink in the last 5 years.   every six (6) hours as needed for Pain. 400-600 mg      psyllium (METAMUCIL) powd Take  by mouth. valACYclovir (VALTREX) 500 mg tablet Take  by mouth as needed. loratadine (CLARITIN) 10 mg tablet Take 10 mg by mouth. cholecalciferol, vitamin D3, 50 mcg (2,000 unit) tab Take 2,000 Units by mouth daily. Allergies: Penicillin g     Tobacco History:  reports that she has never smoked. She has never used smokeless tobacco.  Alcohol Abuse:  reports current alcohol use. Drug Abuse:  reports no history of drug use.     Family Medical/Cancer History:   Family History   Problem Relation Age of Onset    Cancer Father         Lung    Cancer Other         father history of colon polyps    Breast Cancer Other     Breast Cancer Maternal Aunt         Review of Systems - History obtained from the patient  Constitutional: negative for weight loss, fever, night sweats  HEENT: negative for hearing loss, earache, congestion, snoring, sorethroat  CV: negative for chest pain, palpitations, edema  Resp: negative for cough, shortness of breath, wheezing  GI: negative for change in bowel habits, abdominal pain, black or bloody stools  : negative for frequency, dysuria, hematuria, vaginal discharge  MSK: negative for back pain, joint pain, muscle pain  Breast: negative for breast lumps, nipple discharge, galactorrhea  Skin :negative for itching, rash, hives  Neuro: negative for dizziness, headache, confusion, weakness  Psych: negative for anxiety, depression, change in mood  Heme/lymph: negative for bleeding, bruising, pallor    Physical Exam    Visit Vitals  /82   Wt 134 lb (60.8 kg)   BMI 22.30 kg/m²       Constitutional  Appearance: well-nourished, well developed, alert, in no acute distress    HENT  Head and Face: appears normal    Chest  Respiratory Effort: breathing unlabored      Breasts  Inspection of Breasts: breasts symmetrical, no skin changes, no discharge present, nipple appearance normal, no skin retraction present  Palpation of Breasts and Axillae: no masses present on palpation, no breast tenderness  Axillary Lymph Nodes: no lymphadenopathy present    Gastrointestinal  Abdominal Examination: abdomen non-tender to palpation, normal bowel sounds, no masses present  Liver and spleen: no hepatomegaly present, spleen not palpable  Hernias: no hernias identified    Skin  General Inspection: no rash, no lesions identified    Neurologic/Psychiatric  Mental Status:  Orientation: grossly oriented to person, place and time  Mood and Affect: mood normal, affect appropriate    Genitourinary  External Genitalia: normal appearance for age, no discharge present, no tenderness present, no inflammatory lesions present, no masses present, no atrophy present  Vagina: normal vaginal vault without central or paravaginal defects, no discharge present, no inflammatory lesions present, no masses present  Bladder: non-tender to palpation  Urethra: appears normal  Cervix: normal   Uterus: normal size, shape and consistency  Adnexa: no adnexal tenderness present, no adnexal masses present  Perineum: perineum within normal limits, no evidence of trauma, no rashes or skin lesions present  Anus: anus within normal limits, no hemorrhoids present  Inguinal Lymph Nodes: no lymphadenopathy present    Assessment:  Routine gynecologic examination  Personal hx osteopenia and breast cancer  Her current medical status is satisfactory with no evidence of significant gynecologic issues. Plan:  Pap done today   Script ocp  Patient declines presence of chaperone during today's visit.    Counseled re: diet, exercise, healthy lifestyle  Return for yearly wellness visits  Rec annual mammogram

## 2023-01-17 ENCOUNTER — APPOINTMENT (RX ONLY)
Dept: URBAN - METROPOLITAN AREA CLINIC 20 | Facility: CLINIC | Age: 73
Setting detail: DERMATOLOGY
End: 2023-01-17

## 2023-01-17 DIAGNOSIS — L57.0 ACTINIC KERATOSIS: ICD-10-CM

## 2023-01-17 DIAGNOSIS — Z00.00 ENCOUNTER FOR GENERAL ADULT MEDICAL EXAMINATION WITHOUT ABNORMAL FINDINGS: ICD-10-CM | Status: RESOLVED

## 2023-01-17 PROCEDURE — 17000 DESTRUCT PREMALG LESION: CPT

## 2023-01-17 PROCEDURE — ? COUNSELING

## 2023-01-17 PROCEDURE — ? LIQUID NITROGEN

## 2023-01-17 PROCEDURE — 99212 OFFICE O/P EST SF 10 MIN: CPT | Mod: 25

## 2023-01-17 PROCEDURE — ? ADDITIONAL NOTES

## 2023-01-17 PROCEDURE — 17003 DESTRUCT PREMALG LES 2-14: CPT

## 2023-01-17 ASSESSMENT — LOCATION SIMPLE DESCRIPTION DERM
LOCATION SIMPLE: RIGHT TEMPLE
LOCATION SIMPLE: RIGHT FOREARM
LOCATION SIMPLE: NOSE
LOCATION SIMPLE: RIGHT CHEEK
LOCATION SIMPLE: LEFT HAND
LOCATION SIMPLE: LEFT CHEEK
LOCATION SIMPLE: RIGHT HAND

## 2023-01-17 ASSESSMENT — LOCATION DETAILED DESCRIPTION DERM
LOCATION DETAILED: LEFT RADIAL DORSAL HAND
LOCATION DETAILED: RIGHT DORSAL MIDDLE METACARPOPHALANGEAL JOINT
LOCATION DETAILED: LEFT CENTRAL MALAR CHEEK
LOCATION DETAILED: RIGHT SUPERIOR CENTRAL MALAR CHEEK
LOCATION DETAILED: RIGHT SUPERIOR LATERAL MALAR CHEEK
LOCATION DETAILED: RIGHT PROXIMAL DORSAL FOREARM
LOCATION DETAILED: RIGHT RADIAL DORSAL HAND
LOCATION DETAILED: RIGHT INFERIOR TEMPLE
LOCATION DETAILED: NASAL SUPRATIP
LOCATION DETAILED: RIGHT SUPERIOR MEDIAL MALAR CHEEK
LOCATION DETAILED: RIGHT MEDIAL MALAR CHEEK

## 2023-01-17 ASSESSMENT — LOCATION ZONE DERM
LOCATION ZONE: HAND
LOCATION ZONE: ARM
LOCATION ZONE: FACE
LOCATION ZONE: NOSE

## 2023-01-17 NOTE — PROCEDURE: MIPS QUALITY
Detail Level: Detailed
Quality 431: Preventive Care And Screening: Unhealthy Alcohol Use - Screening: Patient screened for unhealthy alcohol use using a single question and scores less than 2 times per year
Quality 402: Tobacco Use And Help With Quitting Among Adolescents: Patient screened for tobacco and never smoked
Quality 226: Preventive Care And Screening: Tobacco Use: Screening And Cessation Intervention: Patient screened for tobacco use and is an ex/non-smoker
Quality 130: Documentation Of Current Medications In The Medical Record: Current Medications Documented
Quality 111:Pneumonia Vaccination Status For Older Adults: Pneumococcal Vaccination Previously Received

## 2023-01-17 NOTE — PROCEDURE: ADDITIONAL NOTES
Render Risk Assessment In Note?: no
Detail Level: Simple
Additional Notes: Papule present at time of SCC EXC in 5/2022, clear today

## 2023-04-12 ENCOUNTER — APPOINTMENT (RX ONLY)
Dept: URBAN - METROPOLITAN AREA CLINIC 20 | Facility: CLINIC | Age: 73
Setting detail: DERMATOLOGY
End: 2023-04-12

## 2023-04-12 DIAGNOSIS — L57.8 OTHER SKIN CHANGES DUE TO CHRONIC EXPOSURE TO NONIONIZING RADIATION: ICD-10-CM

## 2023-04-12 DIAGNOSIS — L57.0 ACTINIC KERATOSIS: ICD-10-CM

## 2023-04-12 DIAGNOSIS — L82.1 OTHER SEBORRHEIC KERATOSIS: ICD-10-CM

## 2023-04-12 DIAGNOSIS — L81.4 OTHER MELANIN HYPERPIGMENTATION: ICD-10-CM

## 2023-04-12 DIAGNOSIS — Z85.828 PERSONAL HISTORY OF OTHER MALIGNANT NEOPLASM OF SKIN: ICD-10-CM

## 2023-04-12 DIAGNOSIS — D22 MELANOCYTIC NEVI: ICD-10-CM

## 2023-04-12 DIAGNOSIS — D18.0 HEMANGIOMA: ICD-10-CM

## 2023-04-12 PROBLEM — D22.5 MELANOCYTIC NEVI OF TRUNK: Status: ACTIVE | Noted: 2023-04-12

## 2023-04-12 PROBLEM — D18.01 HEMANGIOMA OF SKIN AND SUBCUTANEOUS TISSUE: Status: ACTIVE | Noted: 2023-04-12

## 2023-04-12 PROCEDURE — 99213 OFFICE O/P EST LOW 20 MIN: CPT | Mod: 25

## 2023-04-12 PROCEDURE — ? LIQUID NITROGEN

## 2023-04-12 PROCEDURE — 17000 DESTRUCT PREMALG LESION: CPT

## 2023-04-12 PROCEDURE — ? COUNSELING

## 2023-04-12 PROCEDURE — 17003 DESTRUCT PREMALG LES 2-14: CPT

## 2023-04-12 ASSESSMENT — LOCATION DETAILED DESCRIPTION DERM
LOCATION DETAILED: RIGHT MEDIAL FOREHEAD
LOCATION DETAILED: LEFT LATERAL EYEBROW
LOCATION DETAILED: LEFT DISTAL DORSAL FOREARM
LOCATION DETAILED: RIGHT INFERIOR ANTERIOR NECK
LOCATION DETAILED: LEFT DISTAL POSTERIOR THIGH
LOCATION DETAILED: RIGHT RADIAL DORSAL HAND
LOCATION DETAILED: RIGHT SUPERIOR UPPER BACK
LOCATION DETAILED: RIGHT INFERIOR CENTRAL MALAR CHEEK
LOCATION DETAILED: RIGHT INFERIOR MEDIAL UPPER BACK
LOCATION DETAILED: RIGHT DISTAL POSTERIOR UPPER ARM
LOCATION DETAILED: LEFT DISTAL POSTERIOR UPPER ARM
LOCATION DETAILED: RIGHT DISTAL LATERAL POSTERIOR THIGH
LOCATION DETAILED: LEFT RADIAL DORSAL HAND
LOCATION DETAILED: LEFT SUPERIOR UPPER BACK

## 2023-04-12 ASSESSMENT — LOCATION ZONE DERM
LOCATION ZONE: ARM
LOCATION ZONE: TRUNK
LOCATION ZONE: LEG
LOCATION ZONE: NECK
LOCATION ZONE: FACE
LOCATION ZONE: HAND

## 2023-04-12 ASSESSMENT — LOCATION SIMPLE DESCRIPTION DERM
LOCATION SIMPLE: RIGHT ANTERIOR NECK
LOCATION SIMPLE: LEFT FOREARM
LOCATION SIMPLE: RIGHT CHEEK
LOCATION SIMPLE: LEFT EYEBROW
LOCATION SIMPLE: RIGHT FOREHEAD
LOCATION SIMPLE: RIGHT UPPER ARM
LOCATION SIMPLE: LEFT HAND
LOCATION SIMPLE: RIGHT POSTERIOR THIGH
LOCATION SIMPLE: LEFT UPPER BACK
LOCATION SIMPLE: RIGHT HAND
LOCATION SIMPLE: LEFT UPPER ARM
LOCATION SIMPLE: LEFT POSTERIOR THIGH
LOCATION SIMPLE: RIGHT UPPER BACK

## 2023-12-15 ENCOUNTER — APPOINTMENT (RX ONLY)
Dept: URBAN - METROPOLITAN AREA CLINIC 20 | Facility: CLINIC | Age: 73
Setting detail: DERMATOLOGY
End: 2023-12-15

## 2023-12-15 DIAGNOSIS — L57.0 ACTINIC KERATOSIS: ICD-10-CM

## 2023-12-15 DIAGNOSIS — L82.1 OTHER SEBORRHEIC KERATOSIS: ICD-10-CM

## 2023-12-15 DIAGNOSIS — L81.4 OTHER MELANIN HYPERPIGMENTATION: ICD-10-CM

## 2023-12-15 DIAGNOSIS — L30.9 DERMATITIS, UNSPECIFIED: ICD-10-CM

## 2023-12-15 DIAGNOSIS — D18.0 HEMANGIOMA: ICD-10-CM

## 2023-12-15 DIAGNOSIS — D22 MELANOCYTIC NEVI: ICD-10-CM

## 2023-12-15 DIAGNOSIS — Z85.828 PERSONAL HISTORY OF OTHER MALIGNANT NEOPLASM OF SKIN: ICD-10-CM

## 2023-12-15 DIAGNOSIS — L57.8 OTHER SKIN CHANGES DUE TO CHRONIC EXPOSURE TO NONIONIZING RADIATION: ICD-10-CM

## 2023-12-15 PROBLEM — D18.01 HEMANGIOMA OF SKIN AND SUBCUTANEOUS TISSUE: Status: ACTIVE | Noted: 2023-12-15

## 2023-12-15 PROBLEM — D22.5 MELANOCYTIC NEVI OF TRUNK: Status: ACTIVE | Noted: 2023-12-15

## 2023-12-15 PROCEDURE — 99213 OFFICE O/P EST LOW 20 MIN: CPT | Mod: 25

## 2023-12-15 PROCEDURE — 17000 DESTRUCT PREMALG LESION: CPT

## 2023-12-15 PROCEDURE — ? COUNSELING

## 2023-12-15 PROCEDURE — 17003 DESTRUCT PREMALG LES 2-14: CPT

## 2023-12-15 PROCEDURE — ? LIQUID NITROGEN

## 2023-12-15 ASSESSMENT — LOCATION DETAILED DESCRIPTION DERM
LOCATION DETAILED: RIGHT INFERIOR MEDIAL UPPER BACK
LOCATION DETAILED: RIGHT RADIAL DORSAL HAND
LOCATION DETAILED: LEFT SUPERIOR UPPER BACK
LOCATION DETAILED: MID TRAPEZIAL NECK
LOCATION DETAILED: 1ST WEB SPACE RIGHT HAND
LOCATION DETAILED: LEFT SUPERIOR FOREHEAD
LOCATION DETAILED: NASAL DORSUM
LOCATION DETAILED: LEFT ULNAR DORSAL HAND
LOCATION DETAILED: RIGHT SUPERIOR UPPER BACK
LOCATION DETAILED: RIGHT INFERIOR ANTERIOR NECK
LOCATION DETAILED: LEFT PROXIMAL POSTERIOR UPPER ARM
LOCATION DETAILED: RIGHT DISTAL POSTERIOR UPPER ARM
LOCATION DETAILED: LEFT DISTAL DORSAL FOREARM
LOCATION DETAILED: LEFT RADIAL DORSAL HAND
LOCATION DETAILED: RIGHT ULNAR DORSAL HAND
LOCATION DETAILED: RIGHT DISTAL LATERAL POSTERIOR THIGH
LOCATION DETAILED: RIGHT CENTRAL MALAR CHEEK
LOCATION DETAILED: RIGHT INFERIOR CENTRAL MALAR CHEEK
LOCATION DETAILED: LEFT DISTAL POSTERIOR UPPER ARM
LOCATION DETAILED: LEFT DISTAL POSTERIOR THIGH
LOCATION DETAILED: LEFT ANTIHELIX

## 2023-12-15 ASSESSMENT — LOCATION ZONE DERM
LOCATION ZONE: NOSE
LOCATION ZONE: FINGER
LOCATION ZONE: NECK
LOCATION ZONE: ARM
LOCATION ZONE: LEG
LOCATION ZONE: TRUNK
LOCATION ZONE: HAND
LOCATION ZONE: FACE
LOCATION ZONE: EAR

## 2023-12-15 ASSESSMENT — LOCATION SIMPLE DESCRIPTION DERM
LOCATION SIMPLE: RIGHT UPPER ARM
LOCATION SIMPLE: RIGHT CHEEK
LOCATION SIMPLE: RIGHT THUMB
LOCATION SIMPLE: RIGHT UPPER BACK
LOCATION SIMPLE: RIGHT HAND
LOCATION SIMPLE: RIGHT POSTERIOR THIGH
LOCATION SIMPLE: LEFT UPPER BACK
LOCATION SIMPLE: LEFT EAR
LOCATION SIMPLE: RIGHT ANTERIOR NECK
LOCATION SIMPLE: LEFT HAND
LOCATION SIMPLE: TRAPEZIAL NECK
LOCATION SIMPLE: LEFT FOREHEAD
LOCATION SIMPLE: LEFT UPPER ARM
LOCATION SIMPLE: LEFT FOREARM
LOCATION SIMPLE: LEFT POSTERIOR THIGH
LOCATION SIMPLE: NOSE

## 2023-12-15 NOTE — PROCEDURE: LIQUID NITROGEN
Detail Level: Detailed
Duration Of Freeze Thaw-Cycle (Seconds): 10
Show Applicator Variable?: Yes
Render Note In Bullet Format When Appropriate: No
Consent: The patient's consent was obtained including but not limited to risks of crusting, scabbing, blistering, scarring, darker or lighter pigmentary change, recurrence, incomplete removal and infection. RTC in 2 months if lesion(s) persistent.
Number Of Freeze-Thaw Cycles: 2 freeze-thaw cycles
Post-Care Instructions: I reviewed with the patient in detail post-care instructions. Patient is to wear sunprotection, and avoid picking at any of the treated lesions. Pt may apply Vaseline to crusted or scabbing areas.

## 2024-03-28 NOTE — HPI: SKIN LESION (SQUAMOUS CELL CARCINOMA)
How Severe Is Your Skin Cancer?: moderate
Is This A New Presentation, Or A Follow-Up?: Follow Up Squamous Cell Carcinoma
When Was Squamous Cell Carcinoma Biopsied? (Optional): 5/26/22
Accession # (Optional): [id number]
presents with    Diagnosis Orders   1. Acute pain of left shoulder  XR SHOULDER LEFT (MIN 2 VIEWS)      2. Biceps tendinitis of left shoulder  MRI SHOULDER LEFT WO CONTRAST      3. Superior labrum anterior-to-posterior (SLAP) tear of left shoulder  MRI SHOULDER LEFT WO CONTRAST      4. Non-insulin dependent type 2 diabetes mellitus (HCC)            No orders of the defined types were placed in this encounter.      Based on the patient's exam, I am concerned for biceps tendinitis and possible SLAP tear  The patient has continued having worsening pain over the last 6 months despite conservative management, including anti-inflammatories, ice, oral steroids, muscle relaxers, and 2 different corticosteroid injections  I would like to order an MRI to further assess for SLAP lesion  The patient will follow-up with Dr. Bowers after the MRI is complete to go over the results and further treatment options      Brenda Torres PA-C

## 2025-02-14 ENCOUNTER — APPOINTMENT (OUTPATIENT)
Dept: URBAN - METROPOLITAN AREA CLINIC 20 | Facility: CLINIC | Age: 75
Setting detail: DERMATOLOGY
End: 2025-02-14

## 2025-02-14 DIAGNOSIS — L81.4 OTHER MELANIN HYPERPIGMENTATION: ICD-10-CM

## 2025-02-14 DIAGNOSIS — D18.0 HEMANGIOMA: ICD-10-CM

## 2025-02-14 DIAGNOSIS — L57.8 OTHER SKIN CHANGES DUE TO CHRONIC EXPOSURE TO NONIONIZING RADIATION: ICD-10-CM

## 2025-02-14 DIAGNOSIS — L82.1 OTHER SEBORRHEIC KERATOSIS: ICD-10-CM

## 2025-02-14 DIAGNOSIS — D22 MELANOCYTIC NEVI: ICD-10-CM

## 2025-02-14 DIAGNOSIS — L72.8 OTHER FOLLICULAR CYSTS OF THE SKIN AND SUBCUTANEOUS TISSUE: ICD-10-CM

## 2025-02-14 DIAGNOSIS — Z85.828 PERSONAL HISTORY OF OTHER MALIGNANT NEOPLASM OF SKIN: ICD-10-CM

## 2025-02-14 DIAGNOSIS — L57.0 ACTINIC KERATOSIS: ICD-10-CM

## 2025-02-14 PROBLEM — D22.5 MELANOCYTIC NEVI OF TRUNK: Status: ACTIVE | Noted: 2025-02-14

## 2025-02-14 PROBLEM — D18.01 HEMANGIOMA OF SKIN AND SUBCUTANEOUS TISSUE: Status: ACTIVE | Noted: 2025-02-14

## 2025-02-14 PROCEDURE — 17000 DESTRUCT PREMALG LESION: CPT

## 2025-02-14 PROCEDURE — ? COUNSELING

## 2025-02-14 PROCEDURE — ? LIQUID NITROGEN

## 2025-02-14 PROCEDURE — 99213 OFFICE O/P EST LOW 20 MIN: CPT | Mod: 25

## 2025-02-14 PROCEDURE — 17003 DESTRUCT PREMALG LES 2-14: CPT

## 2025-02-14 ASSESSMENT — LOCATION ZONE DERM
LOCATION ZONE: TRUNK
LOCATION ZONE: NOSE
LOCATION ZONE: NECK
LOCATION ZONE: SCALP
LOCATION ZONE: HAND
LOCATION ZONE: ARM
LOCATION ZONE: FACE
LOCATION ZONE: LEG

## 2025-02-14 ASSESSMENT — LOCATION SIMPLE DESCRIPTION DERM
LOCATION SIMPLE: LEFT FOREARM
LOCATION SIMPLE: RIGHT UPPER ARM
LOCATION SIMPLE: SCALP
LOCATION SIMPLE: NOSE
LOCATION SIMPLE: RIGHT POSTERIOR THIGH
LOCATION SIMPLE: RIGHT UPPER BACK
LOCATION SIMPLE: LEFT UPPER ARM
LOCATION SIMPLE: RIGHT HAND
LOCATION SIMPLE: LEFT POSTERIOR THIGH
LOCATION SIMPLE: LEFT HAND
LOCATION SIMPLE: LEFT UPPER BACK
LOCATION SIMPLE: RIGHT CHEEK
LOCATION SIMPLE: RIGHT ANTERIOR NECK

## 2025-02-14 ASSESSMENT — LOCATION DETAILED DESCRIPTION DERM
LOCATION DETAILED: RIGHT DISTAL POSTERIOR UPPER ARM
LOCATION DETAILED: RIGHT INFERIOR ANTERIOR NECK
LOCATION DETAILED: LEFT SUPERIOR UPPER BACK
LOCATION DETAILED: RIGHT SUPERIOR UPPER BACK
LOCATION DETAILED: LEFT RADIAL DORSAL HAND
LOCATION DETAILED: RIGHT DISTAL LATERAL POSTERIOR THIGH
LOCATION DETAILED: RIGHT RADIAL DORSAL HAND
LOCATION DETAILED: LEFT CENTRAL POSTAURICULAR SKIN
LOCATION DETAILED: LEFT ANTERIOR PROXIMAL UPPER ARM
LOCATION DETAILED: RIGHT INFERIOR MEDIAL UPPER BACK
LOCATION DETAILED: LEFT DISTAL DORSAL FOREARM
LOCATION DETAILED: NASAL ROOT
LOCATION DETAILED: LEFT DISTAL POSTERIOR UPPER ARM
LOCATION DETAILED: RIGHT INFERIOR CENTRAL MALAR CHEEK
LOCATION DETAILED: LEFT DISTAL POSTERIOR THIGH

## 2025-03-13 NOTE — TELEPHONE ENCOUNTER
Pt came in and today and would like his Ambien to be refilled. He is going out of town and has an appt on 3/30/2018. Please email pt or call  and let him know if this can be called in. Thank you   Detail Level: Detailed Depth Of Biopsy: dermis Was A Bandage Applied: Yes Size Of Lesion In Cm: 0 Biopsy Type: H and E Biopsy Method: Dermablade Anesthesia Type: 1% lidocaine with epinephrine Anesthesia Volume In Cc: 0.5 Hemostasis: Drysol Wound Care: Petrolatum Dressing: bandage Destruction After The Procedure: No Type Of Destruction Used: Curettage Curettage Text: The wound bed was treated with curettage after the biopsy was performed. Cryotherapy Text: The wound bed was treated with cryotherapy after the biopsy was performed. Electrodesiccation Text: The wound bed was treated with electrodesiccation after the biopsy was performed. Electrodesiccation And Curettage Text: The wound bed was treated with electrodesiccation and curettage after the biopsy was performed. Silver Nitrate Text: The wound bed was treated with silver nitrate after the biopsy was performed. Lab: [id number] Lab Facility: [id number] Medical Necessity Information: It is in your best interest to select a reason for this procedure from the list below. All of these items fulfill various CMS LCD requirements except the new and changing color options. Consent: Written consent was obtained and risks were reviewed including but not limited to scarring, infection, bleeding, scabbing, incomplete removal, nerve damage and allergy to anesthesia. Post-Care Instructions: I reviewed with the patient in detail post-care instructions. Patient is to keep the biopsy site dry overnight, and then apply bacitracin twice daily until healed. Patient may apply hydrogen peroxide soaks to remove any crusting. Notification Instructions: Patient will be notified of biopsy results. However, patient instructed to call the office if not contacted within 2 weeks. Billing Type: Third-Party Bill Information: Selecting Yes will display possible errors in your note based on the variables you have selected. This validation is only offered as a suggestion for you. PLEASE NOTE THAT THE VALIDATION TEXT WILL BE REMOVED WHEN YOU FINALIZE YOUR NOTE. IF YOU WANT TO FAX A PRELIMINARY NOTE YOU WILL NEED TO TOGGLE THIS TO 'NO' IF YOU DO NOT WANT IT IN YOUR FAXED NOTE.